# Patient Record
Sex: FEMALE | Race: WHITE | NOT HISPANIC OR LATINO | Employment: UNEMPLOYED | URBAN - METROPOLITAN AREA
[De-identification: names, ages, dates, MRNs, and addresses within clinical notes are randomized per-mention and may not be internally consistent; named-entity substitution may affect disease eponyms.]

---

## 2017-04-03 ENCOUNTER — HOSPITAL ENCOUNTER (OUTPATIENT)
Dept: LAB | Facility: MEDICAL CENTER | Age: 72
End: 2017-04-03
Attending: FAMILY MEDICINE
Payer: MEDICARE

## 2017-04-03 DIAGNOSIS — N39.0 URINARY TRACT INFECTION, SITE UNSPECIFIED: ICD-10-CM

## 2017-04-04 ENCOUNTER — HOSPITAL ENCOUNTER (OUTPATIENT)
Facility: MEDICAL CENTER | Age: 72
End: 2017-04-04
Attending: FAMILY MEDICINE
Payer: MEDICARE

## 2017-04-04 DIAGNOSIS — N39.0 URINARY TRACT INFECTION, SITE UNSPECIFIED: ICD-10-CM

## 2017-04-04 LAB
APPEARANCE UR: CLEAR
BACTERIA #/AREA URNS HPF: ABNORMAL /HPF
BILIRUB UR QL STRIP.AUTO: NEGATIVE
COLOR UR: COLORLESS
CULTURE IF INDICATED INDCX: YES UA CULTURE
EPI CELLS #/AREA URNS HPF: ABNORMAL /HPF
GLUCOSE UR STRIP.AUTO-MCNC: NEGATIVE MG/DL
KETONES UR STRIP.AUTO-MCNC: NEGATIVE MG/DL
LEUKOCYTE ESTERASE UR QL STRIP.AUTO: ABNORMAL
MICRO URNS: ABNORMAL
NITRITE UR QL STRIP.AUTO: NEGATIVE
PH UR STRIP.AUTO: 6.5 [PH]
PROT UR QL STRIP: NEGATIVE MG/DL
RBC # URNS HPF: ABNORMAL /HPF
RBC UR QL AUTO: ABNORMAL
SP GR UR STRIP.AUTO: 1
WBC #/AREA URNS HPF: ABNORMAL /HPF

## 2017-04-04 PROCEDURE — 81001 URINALYSIS AUTO W/SCOPE: CPT

## 2017-04-04 PROCEDURE — 87186 SC STD MICRODIL/AGAR DIL: CPT

## 2017-04-04 PROCEDURE — 87086 URINE CULTURE/COLONY COUNT: CPT

## 2017-04-04 PROCEDURE — 87077 CULTURE AEROBIC IDENTIFY: CPT

## 2017-04-05 DIAGNOSIS — N39.0 URINARY TRACT INFECTION WITHOUT HEMATURIA, SITE UNSPECIFIED: ICD-10-CM

## 2017-04-05 RX ORDER — NITROFURANTOIN 25; 75 MG/1; MG/1
100 CAPSULE ORAL 2 TIMES DAILY
Qty: 14 CAP | Refills: 0 | Status: SHIPPED | OUTPATIENT
Start: 2017-04-05 | End: 2017-11-06

## 2017-04-06 LAB
BACTERIA UR CULT: ABNORMAL
BACTERIA UR CULT: ABNORMAL
SIGNIFICANT IND 70042: ABNORMAL
SOURCE SOURCE: ABNORMAL

## 2017-04-08 ENCOUNTER — HOSPITAL ENCOUNTER (OUTPATIENT)
Dept: LAB | Facility: MEDICAL CENTER | Age: 72
End: 2017-04-08
Attending: FAMILY MEDICINE
Payer: MEDICARE

## 2017-04-08 DIAGNOSIS — R73.01 IMPAIRED FASTING BLOOD SUGAR: ICD-10-CM

## 2017-04-08 DIAGNOSIS — E78.5 DYSLIPIDEMIA: ICD-10-CM

## 2017-04-08 DIAGNOSIS — E03.9 HYPOTHYROIDISM, UNSPECIFIED TYPE: ICD-10-CM

## 2017-04-08 DIAGNOSIS — I10 ESSENTIAL HYPERTENSION: ICD-10-CM

## 2017-04-08 LAB
ALBUMIN SERPL BCP-MCNC: 4 G/DL (ref 3.2–4.9)
ALBUMIN/GLOB SERPL: 1.3 G/DL
ALP SERPL-CCNC: 74 U/L (ref 30–99)
ALT SERPL-CCNC: 12 U/L (ref 2–50)
ANION GAP SERPL CALC-SCNC: 9 MMOL/L (ref 0–11.9)
AST SERPL-CCNC: 17 U/L (ref 12–45)
BILIRUB SERPL-MCNC: 0.5 MG/DL (ref 0.1–1.5)
BUN SERPL-MCNC: 15 MG/DL (ref 8–22)
CALCIUM SERPL-MCNC: 9.5 MG/DL (ref 8.5–10.5)
CHLORIDE SERPL-SCNC: 108 MMOL/L (ref 96–112)
CHOLEST SERPL-MCNC: 173 MG/DL (ref 100–199)
CO2 SERPL-SCNC: 25 MMOL/L (ref 20–33)
CREAT SERPL-MCNC: 0.78 MG/DL (ref 0.5–1.4)
EST. AVERAGE GLUCOSE BLD GHB EST-MCNC: 128 MG/DL
GFR SERPL CREATININE-BSD FRML MDRD: >60 ML/MIN/1.73 M 2
GLOBULIN SER CALC-MCNC: 3.1 G/DL (ref 1.9–3.5)
GLUCOSE SERPL-MCNC: 100 MG/DL (ref 65–99)
HBA1C MFR BLD: 6.1 % (ref 0–5.6)
HDLC SERPL-MCNC: 55 MG/DL
LDLC SERPL CALC-MCNC: 98 MG/DL
POTASSIUM SERPL-SCNC: 4.1 MMOL/L (ref 3.6–5.5)
PROT SERPL-MCNC: 7.1 G/DL (ref 6–8.2)
SODIUM SERPL-SCNC: 142 MMOL/L (ref 135–145)
T4 FREE SERPL-MCNC: 1.13 NG/DL (ref 0.53–1.43)
TRIGL SERPL-MCNC: 101 MG/DL (ref 0–149)
TSH SERPL DL<=0.005 MIU/L-ACNC: 0.11 UIU/ML (ref 0.3–3.7)

## 2017-04-08 PROCEDURE — 36415 COLL VENOUS BLD VENIPUNCTURE: CPT

## 2017-04-08 PROCEDURE — 80053 COMPREHEN METABOLIC PANEL: CPT

## 2017-04-08 PROCEDURE — 80061 LIPID PANEL: CPT

## 2017-04-08 PROCEDURE — 83036 HEMOGLOBIN GLYCOSYLATED A1C: CPT

## 2017-04-08 PROCEDURE — 84439 ASSAY OF FREE THYROXINE: CPT

## 2017-04-08 PROCEDURE — 84443 ASSAY THYROID STIM HORMONE: CPT

## 2017-04-18 ENCOUNTER — OFFICE VISIT (OUTPATIENT)
Dept: MEDICAL GROUP | Facility: PHYSICIAN GROUP | Age: 72
End: 2017-04-18
Payer: MEDICARE

## 2017-04-18 VITALS
WEIGHT: 162 LBS | RESPIRATION RATE: 18 BRPM | OXYGEN SATURATION: 97 % | SYSTOLIC BLOOD PRESSURE: 112 MMHG | TEMPERATURE: 98.6 F | HEART RATE: 66 BPM | BODY MASS INDEX: 26.99 KG/M2 | HEIGHT: 65 IN | DIASTOLIC BLOOD PRESSURE: 62 MMHG

## 2017-04-18 DIAGNOSIS — I10 ESSENTIAL HYPERTENSION: ICD-10-CM

## 2017-04-18 DIAGNOSIS — R73.01 IMPAIRED FASTING BLOOD SUGAR: ICD-10-CM

## 2017-04-18 DIAGNOSIS — E03.9 HYPOTHYROIDISM, UNSPECIFIED TYPE: ICD-10-CM

## 2017-04-18 DIAGNOSIS — E78.5 DYSLIPIDEMIA: ICD-10-CM

## 2017-04-18 DIAGNOSIS — M25.561 CHRONIC PAIN OF RIGHT KNEE: ICD-10-CM

## 2017-04-18 DIAGNOSIS — G89.29 CHRONIC PAIN OF RIGHT KNEE: ICD-10-CM

## 2017-04-18 PROCEDURE — 99214 OFFICE O/P EST MOD 30 MIN: CPT | Performed by: FAMILY MEDICINE

## 2017-04-18 PROCEDURE — 3014F SCREEN MAMMO DOC REV: CPT | Performed by: FAMILY MEDICINE

## 2017-04-18 PROCEDURE — 1101F PT FALLS ASSESS-DOCD LE1/YR: CPT | Performed by: FAMILY MEDICINE

## 2017-04-18 PROCEDURE — G8432 DEP SCR NOT DOC, RNG: HCPCS | Performed by: FAMILY MEDICINE

## 2017-04-18 PROCEDURE — G8419 CALC BMI OUT NRM PARAM NOF/U: HCPCS | Performed by: FAMILY MEDICINE

## 2017-04-18 PROCEDURE — 1036F TOBACCO NON-USER: CPT | Performed by: FAMILY MEDICINE

## 2017-04-18 PROCEDURE — 4040F PNEUMOC VAC/ADMIN/RCVD: CPT | Performed by: FAMILY MEDICINE

## 2017-04-18 RX ORDER — TIMOLOL MALEATE 5 MG/ML
1 SOLUTION OPHTHALMIC DAILY
COMMUNITY
End: 2019-12-03

## 2017-04-18 RX ORDER — VIT C/B6/B5/MAGNESIUM/HERB 173 50-5-6-5MG
CAPSULE ORAL
COMMUNITY
End: 2018-01-09

## 2017-04-18 RX ORDER — LATANOPROST 50 UG/ML
1 SOLUTION/ DROPS OPHTHALMIC NIGHTLY
COMMUNITY
End: 2022-04-01

## 2017-04-18 ASSESSMENT — PATIENT HEALTH QUESTIONNAIRE - PHQ9: CLINICAL INTERPRETATION OF PHQ2 SCORE: 0

## 2017-04-18 NOTE — MR AVS SNAPSHOT
"        Kaylee Nazario   2017 8:20 AM   Office Visit   MRN: [id number]    Department:  [facility]   Dept Phone:  [phone]    Description:  Female : 1945   Provider:  Vera Fernandez M.D.           Reason for Visit     Follow-Up 6 MONTH FOLLOW UP     Results LABS       Allergies as of 2017     Allergen Noted Reactions    Sulfa Drugs 2009   Anaphylaxis      You were diagnosed with     Hypothyroidism, unspecified type   [5440060]       Essential hypertension   [4550528]       Dyslipidemia   [180972]       Impaired fasting blood sugar   [794167]       Chronic pain of right knee   [6664877]         Vital Signs     Blood Pressure Pulse Temperature Respirations Height Weight    112/62 mmHg 66 37 °C (98.6 °F) 18 1.651 m (5' 5\") 73.483 kg (162 lb)    Body Mass Index Oxygen Saturation Smoking Status             26.96 kg/m2 97% Never Smoker          Basic Information     Date Of Birth Sex Race Ethnicity Preferred Language    1945 Female White Non- English      Your appointments     Oct 24, 2017  8:20 AM   Established Patient with Vera Fernandez M.D.   [facility] (--)    [address]  [address]  [facility] [address] [phone]           You will be receiving a confirmation call a few days before your appointment from our automated call confirmation system.              Problem List              ICD-10-CM Priority Class Noted - Resolved    Trigger finger, acquired M65.30   2012 - Present    Impaired fasting blood sugar R73.01   Unknown - Present    Hypothyroidism E03.9   Unknown - Present    Essential hypertension I10   2016 - Present    Dyslipidemia E78.5   2016 - Present    Obmwijiwk-Tpuzzrh-Mvtwzw disease M22.40   2016 - Present    Hypothyroidism E03.9   10/4/2016 - Present      Health Maintenance        Date Due Completion Dates    IMM DTaP/Tdap/Td Vaccine (1 - Tdap) 1963 ---    PAP SMEAR 1965 ---    COLONOSCOPY 1994 ---   "    IMM ZOSTER VACCINE 12/5/2004 ---    MAMMOGRAM 11/7/2017 11/7/2016, 8/29/2014, 2/27/2014, 2/21/2014, 3/12/2013, 1/7/2013, 12/31/2012, 12/28/2011, 12/22/2010, 12/21/2009, 12/21/2009, 12/15/2008, 12/15/2008, 12/5/2007, 12/5/2007, 12/1/2006, 11/28/2005, 11/20/2004    BONE DENSITY 11/13/2020 11/13/2015, 6/24/2009            Current Immunizations     13-VALENT PCV PREVNAR 11/13/2015    Influenza Vaccine Adult HD 9/10/2016, 10/5/2015    Pneumococcal polysaccharide vaccine (PPSV-23) 9/12/2014    Tetanus Vaccine 7/27/2009      Below and/or attached are the medications your provider expects you to take. Review all of your home medications and newly ordered medications with your provider and/or pharmacist. Follow medication instructions as directed by your provider and/or pharmacist. Please keep your medication list with you and share with your provider. Update the information when medications are discontinued, doses are changed, or new medications (including over-the-counter products) are added; and carry medication information at all times in the event of emergency situations     Allergies:  SULFA DRUGS - Anaphylaxis               Medications  Valid as of: April 18, 2017 -  9:16 AM    Generic Name Brand Name Tablet Size Instructions for use    AmLODIPine Besylate (Tab) NORVASC 5 MG Take 5 mg by mouth every day.        AmLODIPine Besylate (Tab) NORVASC 5 MG Take 1 Tab by mouth every day.        Aspirin (Tab) aspirin 81 MG Take 81 mg by mouth every day.        Atorvastatin Calcium (Tab) LIPITOR 20 MG Take 1 Tab by mouth every day.        Calcium-Magnesium-Vitamin D   Take  by mouth every day.        Cholecalciferol   Take 5,000 Units by mouth every day.          Diclofenac Sodium (Gel) Diclofenac Sodium 1 % Apply 4 g to the right knee 4 times a day        Docusate Sodium (Cap) COLACE 100 MG Take 100 mg by mouth every evening.        Fluocinonide (Ointment) LIDEX 0.05 % Apply thinly to oral lesions on the tongue and mouth  4-6 times per day.        Ibuprofen-Diphenhydramine Cit   Take  by mouth as needed.        Latanoprost (Solution) XALATAN 0.005 % Place 1 Drop in both eyes every evening.        Levothyroxine Sodium (Tab) SYNTHROID 75 MCG Take 1 Tab by mouth Every morning on an empty stomach.        Levothyroxine Sodium (Tab) SYNTHROID 100 MCG TAKE ONE TABLET BY MOUTH IN THE MORNING ON  AN  EMPTY  STOMACH        Lidocaine HCl (Solution) XYLOCAINE 2 % 10 mL swish and spit every 6 hours as needed for mouth pain        Meloxicam (Tab) MOBIC 15 MG Take 1 Tab by mouth every day.        Nitrofurantoin Monohyd Macro (Cap) MACROBID 100 MG Take 1 Cap by mouth 2 times a day.        Omega-3 Fatty Acids (CAPSULE DELAYED RELEASE) fish oil 1000 MG Take 1,200 mg by mouth every day.        Oxycodone-Acetaminophen (Tab) PERCOCET 5-325 MG Take 1-2 Tabs by mouth every four hours as needed.        Polyethylene Glycol 3350 (Pack) MIRALAX  Take 17 g by mouth 2 Times a Day. 1/2 capful bid        Timolol Maleate (GEL FORMING SOLUTION) TIMOPTIC-XR 0.5 % Place 1 Drop in both eyes every day.        Turmeric (Cap) Turmeric 500 MG Take  by mouth.        Vitamin E (Cap) VITAMIN E 400 UNIT Take 400 Units by mouth every day.          .                 Medicines prescribed today were sent to:     [facility] [address] [place] [id number]    Phone: [phone] Fax: [phone]    Open 24 Hours?: No      Medication refill instructions:       If your prescription bottle indicates you have medication refills left, it is not necessary to call your provider’s office. Please contact your pharmacy and they will refill your medication.    If your prescription bottle indicates you do not have any refills left, you may request refills at any time through one of the following ways: The online [employer] system (except Urgent Care), by calling your provider’s office, or by asking your pharmacy to contact your  provider’s office with a refill request. Medication refills are processed only during regular business hours and may not be available until the next business day. Your provider may request additional information or to have a follow-up visit with you prior to refilling your medication.   *Please Note: Medication refills are assigned a new Rx number when refilled electronically. Your pharmacy may indicate that no refills were authorized even though a new prescription for the same medication is available at the pharmacy. Please request the medicine by name with the pharmacy before contacting your provider for a refill.        Your To Do List     Future Labs/Procedures Complete By Expires    CBC WITH DIFFERENTIAL  As directed 4/19/2018    COMP METABOLIC PANEL  As directed 4/19/2018    FREE THYROXINE  As directed 4/19/2018    LIPID PROFILE  As directed 4/19/2018    TSH  As directed 4/19/2018         MyChart Access Code: Activation code not generated  Current Wedge Buster Status: Active

## 2017-04-19 NOTE — PROGRESS NOTES
Subjective:      Kaylee Nazario is a 71 y.o. female who presents with Follow-Up and Results            HPI     Patient returns for follow-up of her medical problems.    She continues to have problems with constipation, feeling cold all the time. She has tried MiraLAX which has not helped. She has been taking the ducosate sodium over-the-counter to be able to have bowel movements on a daily basis. She said she takes linzess every 3 days only because she is worried she may get addicted to it. The linzess makes her go to the bathroom right away to 3 times after she takes the medication with soft to loose stools. She still thinks that her thyroid medication is not enough dose to manage her hypothyroidism causing her to have all the above symptoms. Patient has been running already on the hyper active side on levothyroxine 100 µg daily. She did not want to decrease the dose any further.    In terms of her hypertension, she continues to take amlodipine. She said she has been on this for several years. I have made her aware that this has potential for constipation as a side effect.    In terms of her dyslipidemia, she continued to take atorvastatin without any myalgias.    We follow her for impaired fasting blood sugar and she manages this by watching her diet.    She continues to have right knee pain with swelling post arthroscopic surgery for torn meniscus in June 2016 done by Dr. Carias. She went to see Dr. Flores for second opinion who told her to wait for a total of 6 months to see if this would get better. She was told to return if there is no improvement at that time. It has been over 6 months since his surgery. She has tried topical Voltaren gel without help. She has tried meloxicam which also did not help with the swelling and the pain..    Past medical history, past surgical history, family history reviewed-no changes    Social history reviewed-no changes    Allergies reviewed-no changes    Medications  "reviewed-no changes         ROS     Review of systems as per history of present illness, the rest are negative.       Objective:     /62 mmHg  Pulse 66  Temp(Src) 37 °C (98.6 °F)  Resp 18  Ht 1.651 m (5' 5\")  Wt 73.483 kg (162 lb)  BMI 26.96 kg/m2  SpO2 97%     Physical Exam     Examined alert, awake, oriented, not in distress    Neck-supple, no lymphadenopathy, no thyromegaly  Lungs-clear to auscultation, no rales, no wheezes  Heart-regular rate and rhythm, no murmur  Extremities-no edema, clubbing, cyanosis, right knee is bigger than the left due to swelling, no pinpoint tenderness, there is full range of movement on passive flexion and extension without laxity or instability    Hospital Outpatient Visit on 04/08/2017   Component Date Value   • Sodium 04/08/2017 142    • Potassium 04/08/2017 4.1    • Chloride 04/08/2017 108    • Co2 04/08/2017 25    • Anion Gap 04/08/2017 9.0    • Glucose 04/08/2017 100*   • Bun 04/08/2017 15    • Creatinine 04/08/2017 0.78    • Calcium 04/08/2017 9.5    • AST(SGOT) 04/08/2017 17    • ALT(SGPT) 04/08/2017 12    • Alkaline Phosphatase 04/08/2017 74    • Total Bilirubin 04/08/2017 0.5    • Albumin 04/08/2017 4.0    • Total Protein 04/08/2017 7.1    • Globulin 04/08/2017 3.1    • A-G Ratio 04/08/2017 1.3    • Cholesterol,Tot 04/08/2017 173    • Triglycerides 04/08/2017 101 previously 157    • HDL 04/08/2017 55 previously 55    • LDL 04/08/2017 98 previously 109    • Glycohemoglobin 04/08/2017 6.1*   • Est Avg Glucose 04/08/2017 128    • TSH 04/08/2017 0.110*   • Free T-4 04/08/2017 1.13    • GFR If  04/08/2017 >60    • GFR If Non  Ameri* 04/08/2017 >60    Hospital Outpatient Visit on 04/04/2017   Component Date Value   • Micro Urine Req 04/04/2017 Microscopic    • Color 04/04/2017 Colorless    • Character 04/04/2017 Clear    • Specific Gravity 04/04/2017 1.005    • Ph 04/04/2017 6.5    • Glucose 04/04/2017 Negative    • Ketones 04/04/2017 Negative  "   • Protein 04/04/2017 Negative    • Bilirubin 04/04/2017 Negative    • Nitrite 04/04/2017 Negative    • Leukocyte Esterase 04/04/2017 Large*   • Occult Blood 04/04/2017 Moderate*   • Culture Indicated 04/04/2017 Yes    • WBC 04/04/2017 5-10*   • RBC 04/04/2017 5-10*   • Bacteria 04/04/2017 Few*   • Epithelial Cells 04/04/2017 Few    • Significant Indicator 04/04/2017 POS    • Source 04/04/2017 UR    • Urine Culture 04/04/2017 Mixed skin hernán <10,000 cfu/mL*   • Urine Culture 04/04/2017 *                    Value:Escherichia coli  >100,000 cfu/mL               Assessment/Plan:     1. Hypothyroidism, unspecified type  She has ongoing symptoms of hypothyroidism with cold intolerance and constipation but her TSH is slightly suppressed. She does not want me to decrease the dose of the levothyroxine because she said she will feel worse with a lower dose. Her free T4 is normal but the upper limit of normal. She was made aware of potential problems and harm with overactive/over replaced thyroid. She wants to continue the same dose. She will continue taking linzess I told her she can take it on a daily basis. If she is taking this on a daily basis she should stop taking the stool softener to avoid diarrhea. I also discussed with her potential side effect of amlodipine which is constipation. We can change her to different pressure medication but she declined. She said she has been on this medication for years with good control of her blood pressure.  - TSH; Future  - FREE THYROXINE; Future  - LIPID PROFILE; Future  - COMP METABOLIC PANEL; Future  - CBC WITH DIFFERENTIAL; Future    2. Essential hypertension  Good control with amlodipine. She does not want me to change to a different medication because of potential side effects which is constipation.  - TSH; Future  - FREE THYROXINE; Future  - LIPID PROFILE; Future  - COMP METABOLIC PANEL; Future  - CBC WITH DIFFERENTIAL; Future    3. Dyslipidemia  All at target on  atorvastatin.  - TSH; Future  - FREE THYROXINE; Future  - LIPID PROFILE; Future  - COMP METABOLIC PANEL; Future  - CBC WITH DIFFERENTIAL; Future    4. Impaired fasting blood sugar  Fasting blood sugar is still borderline elevated but nondiabetic level. She has increased risk for diabetes so she will continue to avoid sweets and decrease the carbohydrates.  - TSH; Future  - FREE THYROXINE; Future  - LIPID PROFILE; Future  - COMP METABOLIC PANEL; Future  - CBC WITH DIFFERENTIAL; Future    5. Chronic pain of right knee  Advised to schedule follow-up appointment with Dr. Flores because the right knee has not improved.  - TSH; Future  - FREE THYROXINE; Future  - LIPID PROFILE; Future  - COMP METABOLIC PANEL; Future  - CBC WITH DIFFERENTIAL; Future      Please note that this dictation was created using voice recognition software. I have worked with consultants from the vendor as well as technical experts from [employer] to optimize the interface. I have made every reasonable attempt to correct obvious errors, but I expect that there are errors of grammar and possibly content I did not discover before finalizing the note.

## 2017-05-01 RX ORDER — ATORVASTATIN CALCIUM 20 MG/1
TABLET, FILM COATED ORAL
Qty: 90 TAB | Refills: 3 | Status: SHIPPED | OUTPATIENT
Start: 2017-05-01 | End: 2018-01-09

## 2017-05-01 RX ORDER — LEVOTHYROXINE SODIUM 0.1 MG/1
TABLET ORAL
Qty: 90 TAB | Refills: 3 | Status: SHIPPED | OUTPATIENT
Start: 2017-05-01 | End: 2018-03-13 | Stop reason: SDUPTHER

## 2017-05-01 RX ORDER — AMLODIPINE BESYLATE 5 MG/1
TABLET ORAL
Qty: 90 TAB | Refills: 3 | Status: SHIPPED | OUTPATIENT
Start: 2017-05-01 | End: 2017-11-06

## 2017-06-15 RX ORDER — LINACLOTIDE 145 UG/1
CAPSULE, GELATIN COATED ORAL
Qty: 30 CAP | Refills: 0 | Status: SHIPPED | OUTPATIENT
Start: 2017-06-15 | End: 2018-01-01 | Stop reason: SDUPTHER

## 2017-09-15 ENCOUNTER — HOSPITAL ENCOUNTER (OUTPATIENT)
Dept: LAB | Facility: MEDICAL CENTER | Age: 72
End: 2017-09-15
Attending: FAMILY MEDICINE
Payer: MEDICARE

## 2017-09-15 DIAGNOSIS — E03.9 HYPOTHYROIDISM, UNSPECIFIED TYPE: ICD-10-CM

## 2017-09-15 DIAGNOSIS — R73.01 IMPAIRED FASTING BLOOD SUGAR: ICD-10-CM

## 2017-09-15 DIAGNOSIS — E78.5 DYSLIPIDEMIA: ICD-10-CM

## 2017-09-15 DIAGNOSIS — M25.561 CHRONIC PAIN OF RIGHT KNEE: ICD-10-CM

## 2017-09-15 DIAGNOSIS — G89.29 CHRONIC PAIN OF RIGHT KNEE: ICD-10-CM

## 2017-09-15 DIAGNOSIS — I10 ESSENTIAL HYPERTENSION: ICD-10-CM

## 2017-09-15 LAB
ALBUMIN SERPL BCP-MCNC: 4 G/DL (ref 3.2–4.9)
ALBUMIN/GLOB SERPL: 1.4 G/DL
ALP SERPL-CCNC: 75 U/L (ref 30–99)
ALT SERPL-CCNC: 13 U/L (ref 2–50)
ANION GAP SERPL CALC-SCNC: 6 MMOL/L (ref 0–11.9)
AST SERPL-CCNC: 17 U/L (ref 12–45)
BASOPHILS # BLD AUTO: 1.2 % (ref 0–1.8)
BASOPHILS # BLD: 0.07 K/UL (ref 0–0.12)
BILIRUB SERPL-MCNC: 0.5 MG/DL (ref 0.1–1.5)
BUN SERPL-MCNC: 21 MG/DL (ref 8–22)
CALCIUM SERPL-MCNC: 9.2 MG/DL (ref 8.5–10.5)
CHLORIDE SERPL-SCNC: 107 MMOL/L (ref 96–112)
CHOLEST SERPL-MCNC: 157 MG/DL (ref 100–199)
CO2 SERPL-SCNC: 29 MMOL/L (ref 20–33)
CREAT SERPL-MCNC: 0.73 MG/DL (ref 0.5–1.4)
EOSINOPHIL # BLD AUTO: 0.29 K/UL (ref 0–0.51)
EOSINOPHIL NFR BLD: 5.1 % (ref 0–6.9)
ERYTHROCYTE [DISTWIDTH] IN BLOOD BY AUTOMATED COUNT: 43.7 FL (ref 35.9–50)
GFR SERPL CREATININE-BSD FRML MDRD: >60 ML/MIN/1.73 M 2
GLOBULIN SER CALC-MCNC: 2.9 G/DL (ref 1.9–3.5)
GLUCOSE SERPL-MCNC: 100 MG/DL (ref 65–99)
HCT VFR BLD AUTO: 39.9 % (ref 37–47)
HDLC SERPL-MCNC: 52 MG/DL
HGB BLD-MCNC: 13 G/DL (ref 12–16)
IMM GRANULOCYTES # BLD AUTO: 0.01 K/UL (ref 0–0.11)
IMM GRANULOCYTES NFR BLD AUTO: 0.2 % (ref 0–0.9)
LDLC SERPL CALC-MCNC: 80 MG/DL
LYMPHOCYTES # BLD AUTO: 1.72 K/UL (ref 1–4.8)
LYMPHOCYTES NFR BLD: 30.5 % (ref 22–41)
MCH RBC QN AUTO: 30.2 PG (ref 27–33)
MCHC RBC AUTO-ENTMCNC: 32.6 G/DL (ref 33.6–35)
MCV RBC AUTO: 92.8 FL (ref 81.4–97.8)
MONOCYTES # BLD AUTO: 0.35 K/UL (ref 0–0.85)
MONOCYTES NFR BLD AUTO: 6.2 % (ref 0–13.4)
NEUTROPHILS # BLD AUTO: 3.2 K/UL (ref 2–7.15)
NEUTROPHILS NFR BLD: 56.8 % (ref 44–72)
NRBC # BLD AUTO: 0 K/UL
NRBC BLD AUTO-RTO: 0 /100 WBC
PLATELET # BLD AUTO: 240 K/UL (ref 164–446)
PMV BLD AUTO: 11.4 FL (ref 9–12.9)
POTASSIUM SERPL-SCNC: 4.2 MMOL/L (ref 3.6–5.5)
PROT SERPL-MCNC: 6.9 G/DL (ref 6–8.2)
RBC # BLD AUTO: 4.3 M/UL (ref 4.2–5.4)
SODIUM SERPL-SCNC: 142 MMOL/L (ref 135–145)
T4 FREE SERPL-MCNC: 1.27 NG/DL (ref 0.53–1.43)
TRIGL SERPL-MCNC: 123 MG/DL (ref 0–149)
TSH SERPL DL<=0.005 MIU/L-ACNC: 0.08 UIU/ML (ref 0.3–3.7)
WBC # BLD AUTO: 5.6 K/UL (ref 4.8–10.8)

## 2017-09-15 PROCEDURE — 84439 ASSAY OF FREE THYROXINE: CPT

## 2017-09-15 PROCEDURE — 84443 ASSAY THYROID STIM HORMONE: CPT

## 2017-09-15 PROCEDURE — 80061 LIPID PANEL: CPT

## 2017-09-15 PROCEDURE — 80053 COMPREHEN METABOLIC PANEL: CPT

## 2017-09-15 PROCEDURE — 85025 COMPLETE CBC W/AUTO DIFF WBC: CPT

## 2017-09-15 PROCEDURE — 36415 COLL VENOUS BLD VENIPUNCTURE: CPT

## 2017-09-19 ENCOUNTER — TELEPHONE (OUTPATIENT)
Dept: MEDICAL GROUP | Facility: PHYSICIAN GROUP | Age: 72
End: 2017-09-19

## 2017-09-19 NOTE — TELEPHONE ENCOUNTER
ESTABLISHED PATIENT PRE-VISIT PLANNING     Note: Patient will not be contacted if there is no indication to call.     1.  Reviewed notes from the last few office visits within the medical group: Yes    2.  If any orders were placed at last visit or intended to be done for this visit (i.e. 6 mos follow-up), do we have Results/Consult Notes?        •  Labs - Labs ordered, completed and results are in chart.       •  Imaging - Imaging ordered, completed and results are in chart.       •  Referrals - No referrals were ordered at last office visit.    3. Is this appointment scheduled as a Hospital Follow-Up? No    4.  Immunizations were updated in [employer] using WebIZ?: Yes       •  Web [employer] Recommendations: FLU, TDAP and ZOSTAVAX (Shingles)    5.  Patient is due for the following Health Maintenance Topics:   Health Maintenance Due   Topic Date Due   • Annual Wellness Visit  1945   • IMM DTaP/Tdap/Td Vaccine (1 - Tdap) 12/05/1963   • PAP SMEAR  12/05/1965   • COLONOSCOPY  12/05/1994   • IMM ZOSTER VACCINE  12/05/2004   • IMM INFLUENZA (1) 09/01/2017       - Patient has completed FLU, PNEUMOVAX (PPSV23) and PREVNAR (PCV13)  Immunization(s) per WebIZ. Chart has been updated.      6.  Patient was NOT informed to arrive 15 min prior to their scheduled appointment and bring in their medication bottles.

## 2017-09-20 ENCOUNTER — OFFICE VISIT (OUTPATIENT)
Dept: MEDICAL GROUP | Facility: PHYSICIAN GROUP | Age: 72
End: 2017-09-20
Payer: MEDICARE

## 2017-09-20 VITALS
DIASTOLIC BLOOD PRESSURE: 60 MMHG | BODY MASS INDEX: 27 KG/M2 | RESPIRATION RATE: 16 BRPM | TEMPERATURE: 97 F | HEART RATE: 68 BPM | OXYGEN SATURATION: 97 % | SYSTOLIC BLOOD PRESSURE: 130 MMHG | WEIGHT: 162.04 LBS | HEIGHT: 65 IN

## 2017-09-20 DIAGNOSIS — Z23 NEED FOR IMMUNIZATION AGAINST INFLUENZA: ICD-10-CM

## 2017-09-20 DIAGNOSIS — E78.5 DYSLIPIDEMIA: ICD-10-CM

## 2017-09-20 DIAGNOSIS — E03.9 HYPOTHYROIDISM, UNSPECIFIED TYPE: ICD-10-CM

## 2017-09-20 DIAGNOSIS — R73.01 IMPAIRED FASTING BLOOD SUGAR: ICD-10-CM

## 2017-09-20 DIAGNOSIS — I10 ESSENTIAL HYPERTENSION: ICD-10-CM

## 2017-09-20 PROCEDURE — 90662 IIV NO PRSV INCREASED AG IM: CPT | Performed by: FAMILY MEDICINE

## 2017-09-20 PROCEDURE — G0008 ADMIN INFLUENZA VIRUS VAC: HCPCS | Performed by: FAMILY MEDICINE

## 2017-09-20 PROCEDURE — 99214 OFFICE O/P EST MOD 30 MIN: CPT | Mod: 25 | Performed by: FAMILY MEDICINE

## 2017-09-20 RX ORDER — LEVOTHYROXINE SODIUM 88 UG/1
88 TABLET ORAL
Qty: 90 TAB | Refills: 0 | Status: SHIPPED | OUTPATIENT
Start: 2017-09-20 | End: 2017-11-06

## 2017-09-20 NOTE — PROGRESS NOTES
"Subjective:      Kaylee Nazario is a 71 y.o. female who presents with Follow-Up and Immunizations (flu hd)            HPI     Patient returns for follow-up of her medical problems.    She continues to take amlodipine for hypertension with good control of her blood pressure without side effects.    In terms of her dyslipidemia, she continues to take atorvastatin without myalgias.    We follow her for impaired fasting blood sugar. She manages this by watching her diet.    She continues to take thyroid replacement for hypothyroidism.    She was having ongoing right knee pain with swelling post arthroscopic surgery for torn meniscus in June 2016 done by Dr. Carias. She went to see Dr. Flores for second opinion who told her to wait for a total of 6 months to see if this would get better. She went back because of ongoing pain and she was recommended surgery. The pain however ultimately resolved on its own and so she did not need to do the surgery anymore.    She needs a flu shot today.    Past medical history, past surgical history, family history reviewed-no changes    Social history reviewed-no changes    Allergies reviewed-no changes    Medications reviewed-no changes    ROS     Review of systems as per history of present illness, the rest are negative.       Objective:     /60   Pulse 68   Temp 36.1 °C (97 °F)   Resp 16   Ht 1.651 m (5' 5\")   Wt 73.5 kg (162 lb 0.6 oz)   SpO2 97%   BMI 26.96 kg/m²      Physical Exam     Examined alert, awake, oriented, not in distress    Neck-supple, no lymphadenopathy, no thyromegaly  Lungs-clear to auscultation, no rales, no wheezes  Heart-regular rate and rhythm, no murmur  Extremities-no edema, clubbing, cyanosis     Hospital Outpatient Visit on 09/15/2017   Component Date Value   • TSH 09/15/2017 0.080*   • Free T-4 09/15/2017 1.27    • Cholesterol,Tot 09/15/2017 157    • Triglycerides 09/15/2017 123    • HDL 09/15/2017 52    • LDL 09/15/2017 80    • Sodium " 09/15/2017 142    • Potassium 09/15/2017 4.2    • Chloride 09/15/2017 107    • Co2 09/15/2017 29    • Anion Gap 09/15/2017 6.0    • Glucose 09/15/2017 100*   • Bun 09/15/2017 21    • Creatinine 09/15/2017 0.73    • Calcium 09/15/2017 9.2    • AST(SGOT) 09/15/2017 17    • ALT(SGPT) 09/15/2017 13    • Alkaline Phosphatase 09/15/2017 75    • Total Bilirubin 09/15/2017 0.5    • Albumin 09/15/2017 4.0    • Total Protein 09/15/2017 6.9    • Globulin 09/15/2017 2.9    • A-G Ratio 09/15/2017 1.4    • WBC 09/15/2017 5.6    • RBC 09/15/2017 4.30    • Hemoglobin 09/15/2017 13.0    • Hematocrit 09/15/2017 39.9    • MCV 09/15/2017 92.8    • MCH 09/15/2017 30.2    • MCHC 09/15/2017 32.6*   • RDW 09/15/2017 43.7    • Platelet Count 09/15/2017 240    • MPV 09/15/2017 11.4    • Neutrophils-Polys 09/15/2017 56.80    • Lymphocytes 09/15/2017 30.50    • Monocytes 09/15/2017 6.20    • Eosinophils 09/15/2017 5.10    • Basophils 09/15/2017 1.20    • Immature Granulocytes 09/15/2017 0.20    • Nucleated RBC 09/15/2017 0.00    • Neutrophils (Absolute) 09/15/2017 3.20    • Lymphs (Absolute) 09/15/2017 1.72    • Monos (Absolute) 09/15/2017 0.35    • Eos (Absolute) 09/15/2017 0.29    • Baso (Absolute) 09/15/2017 0.07    • Immature Granulocytes (a* 09/15/2017 0.01    • NRBC (Absolute) 09/15/2017 0.00    • GFR If  09/15/2017 >60    • GFR If Non  Ameri* 09/15/2017 >60         Assessment/Plan:     1. Essential hypertension  Controlled on her medication.    2. Dyslipidemia  All lap target on atorvastatin.    3. Impaired fasting blood sugar  Very borderline elevated blood sugar but nondiabetic level. Continue watching the carbohydrates and sweets.    4. Hypothyroidism, unspecified type  This is over replaced. This is even more over replaced on before. She did not want me to adjust the dose of the medication last time but this time I told her since this is getting worse we will need to decrease the dose from 100 µg to 88 µg  daily. We will do follow-up TSH in 8 weeks.  - TSH; Future  - levothyroxine (SYNTHROID) 88 MCG Tab; Take 1 Tab by mouth Every morning on an empty stomach.  Dispense: 90 Tab; Refill: 0    5. Need for immunization against influenza  Flu shot was given today.  - INFLUENZA VACCINE, HIGH DOSE (65+ ONLY)     follow-up in 6 months or sooner if needed.      Please note that this dictation was created using voice recognition software. I have worked with consultants from the vendor as well as technical experts from [facility] to optimize the interface. I have made every reasonable attempt to correct obvious errors, but I expect that there are errors of grammar and possibly content I did not discover before finalizing the note.

## 2017-11-06 ENCOUNTER — OFFICE VISIT (OUTPATIENT)
Dept: MEDICAL GROUP | Facility: PHYSICIAN GROUP | Age: 72
End: 2017-11-06
Payer: MEDICARE

## 2017-11-06 VITALS
OXYGEN SATURATION: 98 % | TEMPERATURE: 97.6 F | DIASTOLIC BLOOD PRESSURE: 84 MMHG | SYSTOLIC BLOOD PRESSURE: 118 MMHG | BODY MASS INDEX: 27.16 KG/M2 | HEART RATE: 76 BPM | WEIGHT: 163 LBS | HEIGHT: 65 IN | RESPIRATION RATE: 12 BRPM

## 2017-11-06 DIAGNOSIS — K59.00 CONSTIPATION, UNSPECIFIED CONSTIPATION TYPE: ICD-10-CM

## 2017-11-06 DIAGNOSIS — M22.41 CHONDROMALACIA OF RIGHT PATELLA: ICD-10-CM

## 2017-11-06 DIAGNOSIS — H26.9 CATARACT OF RIGHT EYE, UNSPECIFIED CATARACT TYPE: ICD-10-CM

## 2017-11-06 DIAGNOSIS — K13.79 MOUTH SORES: ICD-10-CM

## 2017-11-06 DIAGNOSIS — I10 ESSENTIAL HYPERTENSION: ICD-10-CM

## 2017-11-06 DIAGNOSIS — E03.9 HYPOTHYROIDISM, UNSPECIFIED TYPE: ICD-10-CM

## 2017-11-06 PROCEDURE — 99204 OFFICE O/P NEW MOD 45 MIN: CPT | Performed by: INTERNAL MEDICINE

## 2017-11-06 NOTE — ASSESSMENT & PLAN NOTE
Has had long standing issues with trying to control her hypothyroidism well. Used to see an endocrinologist at [facility] (debbie Brunner) who had told her that that's the dose (100 mcg) she should remain on for lifetime. However her subsequent PCP then started to re-titrate the medication from 100 mcg to 88 mcg given that her TSH was low. She had told her PCP that she was lethargic, had mind wandering, severe constipation but her PCP wouldn't prescribe her the 100 mcg again. Has pills for 100 mcg dose at home but didn't take them because she wanted to check with a doctor first. Was on a 100 mcg dose since seeing the [facility] endocrinologist 3 years ago. With every TSH check she's had someone always changes her dose.

## 2017-11-06 NOTE — PROGRESS NOTES
PRIMARY CARE CLINIC NEW PATIENT H&P  Chief Complaint   Patient presents with   • Thyroid Problem       History of Present Illness     Kaylee is a pleasant 72 yo female here to establish care.     Hypothyroidism  Has had long standing issues with trying to control her hypothyroidism well. Used to see an endocrinologist at [facility] (debbie Brunner) who had told her that that's the dose (100 mcg) she should remain on for lifetime. However her subsequent PCP then started to re-titrate the medication from 100 mcg to 88 mcg given that her TSH was low. She had told her PCP that she was lethargic, had mind wandering, severe constipation but her PCP wouldn't prescribe her the 100 mcg again. Has pills for 100 mcg dose at home but didn't take them because she wanted to check with a doctor first. Was on a 100 mcg dose since seeing the [facility] endocrinologist 3 years ago. With every TSH check she's had someone always changes her dose.     Cataract  Due for right sided cataract surgery next Monday. Is on eye drops.     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 She takes over the counter women's laxative, docusate, and prn Linzess (prescribed from her prior PCP). Her constipation is better controlled when she's on a dose of synthroid 100 mcg. Doesn't take any fiber supplements. Eats a lot of apples. Has lettuce with 2 meals a day. Since her  has been upgraded from a thickened liquids diet since his hospitalization he's been binging a lot and they have been eating out every night for dinner.     Mouth sores  Uses viscous lidocaine for this. Has had multiple inconclusive biopsies. Now has several blisters on the roof of her mouth. She's been having issues with this since she moved to [place] from [facility]. Doesn't have any symptoms in [place]. This has been an issue since she moved to [place] for 14 years.     Essential  hypertension  Has been on amlodipine when she was in her 30s. She isn't sure if she needs it. Had a hypertensive crisis in 2002 when she lived in [place] and had a dose increase in her amlodipine. Hasn't had an episode since. Denies chest pain, shortness of breath, palpitations.     Kmljvzdza-Dzdhhby-Dtghsg disease  Had R knee pain s/p a meniscus repair that had initially worsened her symptoms. However, the entire problem with her right knee entirely self resolved and she has no issues.       Current Outpatient Prescriptions   Medication Sig Dispense Refill   • LINZESS 145 MCG Cap TAKE ONE CAPSULE BY MOUTH ONCE DAILY 30 Cap 0   • atorvastatin (LIPITOR) 20 MG Tab TAKE ONE TABLET BY MOUTH ONCE DAILY 90 Tab 3   • levothyroxine (SYNTHROID) 100 MCG Tab TAKE ONE TABLET BY MOUTH ONCE DAILY IN THE MORNING ON  AN  EMPTY  STOMACH 90 Tab 3   • Turmeric 500 MG Cap Take  by mouth.     • latanoprost (XALATAN) 0.005 % Solution Place 1 Drop in both eyes every evening.     • timolol gel-forming (TIMOPTIC-XR) 0.5 % GEL FORMING SOLUTION Place 1 Drop in both eyes every day.     • lidocaine viscous 2% (XYLOCAINE) 2 % Solution 10 mL swish and spit every 6 hours as needed for mouth pain 100 mL 1   • fluocinonide (LIDEX) 0.05 % Ointment Apply thinly to oral lesions on the tongue and mouth 4-6 times per day. 1 Tube 1   • Calcium-Magnesium-Vitamin D (CALCIUM 500 PO) Take  by mouth every day.     • polyethylene glycol/lytes (MIRALAX) Pack Take 17 g by mouth 2 Times a Day. 1/2 capful bid     • docusate sodium (COLACE) 100 MG Cap Take 100 mg by mouth every evening.     • aspirin 81 MG tablet Take 81 mg by mouth every day.     • vitamin e (VITAMIN E) 400 UNIT CAPS Take 400 Units by mouth every day.       • Omega-3 Fatty Acids (FISH OIL) 1000 MG CPDR Take 1,200 mg by mouth every day.       No current facility-administered medications for this visit.        Past Medical History:   Diagnosis Date   • Cxknglpho-Uvgrqjz-Egelid disease 6/8/2016   •  Cataract 10/2014    Left Eye IOL   • Constipation 2017   • Dyslipidemia 2016   • Essential hypertension 2016   • Hypertension    • Hypothyroidism    • Impaired fasting blood sugar    • Osteopenia    • Trigger finger, acquired 2012     ICD-10 transition     Past Surgical History:   Procedure Laterality Date   • KNEE ARTHROSCOPY Right 2016    Procedure: KNEE ARTHROSCOPY, MEDIAL MENISECTOMY, DEBRIEDMENT OF LATERAL  FEMORAL CONDYLE;  Surgeon: Real Carias M.D.;  Location: SURGERY [facility];  Service:    • LATERAL RELEASE Right 2016    Procedure: LATERAL RELEASE;  Surgeon: Real Carias M.D.;  Location: SURGERY [facility];  Service:    • TRIGGER FINGER RELEASE Bilateral 2016    Procedure: TRIGGER FINGER RELEASE-RING;  Surgeon: Brett Maddox M.D.;  Location: [facility];  Service:    • TRIGGER FINGER RELEASE  2012    Performed by Brett Maddox M.D. at [facility]   • COLONOSCOPY  2011   • CATARACT EXTRACTION WITH IOL Left 3/2009    left   • TUBAL LIGATION     • BREAST BIOPSY      multiple breast biopsies   • TONSILLECTOMY  as child     Social History   Substance Use Topics   • Smoking status: Never Smoker   • Smokeless tobacco: Never Used   • Alcohol use No     Social History     Social History Narrative    Works as a psychiatric nurse      Family History   Problem Relation Age of Onset   • Stroke Father    • Hyperlipidemia Brother    • Other Brother      aortic aneurysm repair   • Cancer Maternal Aunt      Family Status   Relation Status   • Father     stroke   • Mother     multiple organ failure   • Brother Alive   • Maternal Aunt      Allergies: Sulfa drugs    ROS  Constitutional: positive for fatigue  HEENT: Negative for  vision changes, hearing changes    Respiratory: Negative for shortness of breath  Cardiovascular: Negative for chest pain, palpitations  Gastrointestinal: Negative for blood in stool,  "diarrhea. Positive for constipation   Genitourinary: Negative for dysuria, polyuria  Musculoskeletal: Negative for myalgias, back pain, and joint pain.   Skin: Negative for rash  Neurological: Negative for numbness, tingling  Psychiatric/Behavioral: Negative for depression, suicidal/homicidal ideation      Objective   Blood pressure 118/84, pulse 76, temperature 36.4 °C (97.6 °F), resp. rate 12, height 1.651 m (5' 5\"), weight 73.9 kg (163 lb), SpO2 98 %. Body mass index is 27.12 kg/m².    General: Alert, oriented. In no acute distress   HEET: EOMI, PERRL, conjunctiva non-injected, sclera non-icteric.  Nares patent with no significant congestion or drainage.  Chloe pinnae, external auditory canals, TM pearly gray with normal light reflex bilaterally.Oral mucous membranes pink and moist with no lesions.  Neck: supple with no cervical, subclavicular lymphadenopathy, JVD, palpable thyroid nodules   Lungs: clear to auscultation bilaterally with good excursion.  CV: regular rate and rhythm.  Abdomen soft, non-distended, non-tender with normal bowel sounds. No hepatosplenomegaly, no masses palpated  Skin: no lesions. Warm, dry   Psychiatric: appropriate mood and affect     Assessment and Plan   The following treatment plan was discussed     1. Hypothyroidism, unspecified type  Told patient it's OK to resume her prior synthroid dose of 100 mcg as determined by R endocrinology 3 years ago. Her symptoms were well controlled on this dose and will re-assess when she's resumed it in 2 months if she's still tolerating it well.     2. Cataract of right eye, unspecified cataract type  Is due for surgery next Monday. Has already had left cataract surgery.     3. Constipation, unspecified constipation type  Will resume her prior dose of synthroid 100 mcg to see if this helps alleviate her symptoms. I am not sure the appropriateness of using Linzess as prescribed by her prior PCP but the patient uses it infrequently and is mostly " taking over the counter laxatives and stool softeners. Will re-assess at next visit in 2 months.     4. Essential hypertension  Blood pressure is well within goal today at 118/84. Will trial off of amlodipine (only on 5 mg). Asked patient to keep blood pressure log (first thing am blood pressures, discussed proper technique). Asked her to report if she's consistently > 140/90. Will re-assess at next follow up visit in 2 months.     Return in about 2 months (around 1/6/2018).    Health Maintenance    There are no preventive care reminders to display for this patient.    Jose C Caldwell MD  Internal Medicine  [facility]

## 2017-11-06 NOTE — ASSESSMENT & PLAN NOTE
Uses viscous lidocaine for this. Has had multiple inconclusive biopsies. Now has several blisters on the roof of her mouth. She's been having issues with this since she moved to [place] from [facility]. Doesn't have any symptoms in [place]. This has been an issue since she moved to [place] for 14 years.

## 2017-11-06 NOTE — ASSESSMENT & PLAN NOTE
Had R knee pain s/p a meniscus repair that had initially worsened her symptoms. However, the entire problem with her right knee entirely self resolved and she has no issues.

## 2017-11-06 NOTE — PATIENT INSTRUCTIONS
· Your amlodipine has been discontinued. Be sure to take your blood pressures at home to see how they do without this (first thing in the morning). If your blood pressures are > 140/90 then please let us know  · You may take a dose of synthroid 100 mcg if you feel better on that dose

## 2017-11-06 NOTE — ASSESSMENT & PLAN NOTE
Has been on amlodipine when she was in her 30s. She isn't sure if she needs it. Had a hypertensive crisis in 2002 when she lived in [place] and had a dose increase in her amlodipine. Hasn't had an episode since. Denies chest pain, shortness of breath, palpitations.

## 2017-11-07 ENCOUNTER — TELEPHONE (OUTPATIENT)
Dept: MEDICAL GROUP | Facility: PHYSICIAN GROUP | Age: 72
End: 2017-11-07

## 2017-11-07 NOTE — TELEPHONE ENCOUNTER
----- Message from Kaylee Nazario sent at 11/6/2017  7:32 PM PST -----  Regarding: Non-Urgent Medical Question  Contact: [phone]  Dr. Caldwell - thank you for seeing me today and taking me as a new patient.   I had 2 questions:  Why did you discontinue my Vitamin D?   I never drink milk products so thought that would be a good idea to take it.     Also, what is the rationale that you would like me not to take Advil PM.  Thank you.  Rip

## 2017-11-10 ENCOUNTER — APPOINTMENT (OUTPATIENT)
Dept: RADIOLOGY | Facility: MEDICAL CENTER | Age: 72
End: 2017-11-10
Attending: INTERNAL MEDICINE
Payer: MEDICARE

## 2017-11-10 DIAGNOSIS — Z12.31 VISIT FOR SCREENING MAMMOGRAM: ICD-10-CM

## 2017-11-10 PROCEDURE — G0202 SCR MAMMO BI INCL CAD: HCPCS

## 2018-01-02 NOTE — TELEPHONE ENCOUNTER
From: Kaylee Nazario  Sent: 1/1/2018 7:59 AM PST  Subject: Medication Renewal Request    Kaylee Nazario would like a refill of the following medications:     LINZESS 145 MCG Cap [Vera Fernandez M.D.]    Preferred pharmacy: [facility] [address] [place] [address] - [address] [facility]    

## 2018-01-09 ENCOUNTER — OFFICE VISIT (OUTPATIENT)
Dept: MEDICAL GROUP | Facility: PHYSICIAN GROUP | Age: 73
End: 2018-01-09
Payer: MEDICARE

## 2018-01-09 VITALS
OXYGEN SATURATION: 96 % | DIASTOLIC BLOOD PRESSURE: 80 MMHG | RESPIRATION RATE: 12 BRPM | WEIGHT: 164 LBS | SYSTOLIC BLOOD PRESSURE: 118 MMHG | TEMPERATURE: 97.6 F | HEIGHT: 65 IN | BODY MASS INDEX: 27.32 KG/M2 | HEART RATE: 74 BPM

## 2018-01-09 DIAGNOSIS — E03.9 HYPOTHYROIDISM, UNSPECIFIED TYPE: ICD-10-CM

## 2018-01-09 DIAGNOSIS — K59.00 CONSTIPATION, UNSPECIFIED CONSTIPATION TYPE: ICD-10-CM

## 2018-01-09 DIAGNOSIS — E78.5 DYSLIPIDEMIA: ICD-10-CM

## 2018-01-09 DIAGNOSIS — I10 ESSENTIAL HYPERTENSION: ICD-10-CM

## 2018-01-09 DIAGNOSIS — K13.79 MOUTH SORES: ICD-10-CM

## 2018-01-09 PROCEDURE — 99213 OFFICE O/P EST LOW 20 MIN: CPT | Performed by: INTERNAL MEDICINE

## 2018-01-09 RX ORDER — ATORVASTATIN CALCIUM 10 MG/1
10 TABLET, FILM COATED ORAL DAILY
Qty: 90 TAB | Refills: 3
Start: 2018-01-09 | End: 2018-03-13 | Stop reason: SDUPTHER

## 2018-01-09 RX ORDER — AMLODIPINE BESYLATE 5 MG/1
TABLET ORAL
COMMUNITY
Start: 2017-12-12 | End: 2018-03-13 | Stop reason: SDUPTHER

## 2018-01-09 NOTE — ASSESSMENT & PLAN NOTE
Needed to resume amlodipine 5 mg since last visit when we tried to discontinue. Denies lightheadedness, dizziness.

## 2018-01-09 NOTE — ASSESSMENT & PLAN NOTE
She is doing very well on levothyroxine 100 mcg. She doesn't tolerate dose titrations based on labs and does very well on this dose.

## 2018-01-09 NOTE — ASSESSMENT & PLAN NOTE
Uses Linzess periodically, has been on it from prior PCPs. Her constipation has improved since she increased her dose from levothyroxine 88 mcg to 100 mcg. On the Atkins diet which she thinks is contributing to her constipation. Did do a vegetarian diet 3.5 years ago but said her cholesterol samaria on that because she was eating pasta.

## 2018-01-09 NOTE — PROGRESS NOTES
PRIMARY CARE CLINIC FOLLOW UP VISIT  Chief Complaint   Patient presents with   • Hypothyroidism     History of Present Illness     Hypothyroidism  She is doing very well on levothyroxine 100 mcg. She doesn't tolerate dose titrations based on labs and does very well on this dose.     Constipation  Uses Linzess periodically, has been on it from prior PCPs. Her constipation has improved since she increased her dose from levothyroxine 88 mcg to 100 mcg. On the Atkins diet which she thinks is contributing to her constipation. Did do a vegetarian diet 3.5 years ago but said her cholesterol samaria on that because she was eating pasta.     Dyslipidemia  Lipids when last checked 9/2017 were at goal on atorvastatin 20 mg.     Mouth sores  Mouth sores have returned.     Essential hypertension  Needed to resume amlodipine 5 mg since last visit when we tried to discontinue. Denies lightheadedness, dizziness.     Current Outpatient Prescriptions   Medication Sig Dispense Refill   • atorvastatin (LIPITOR) 10 MG Tab Take 1 Tab by mouth every day. 90 Tab 3   • levothyroxine (SYNTHROID) 100 MCG Tab TAKE ONE TABLET BY MOUTH ONCE DAILY IN THE MORNING ON  AN  EMPTY  STOMACH 90 Tab 3   • latanoprost (XALATAN) 0.005 % Solution Place 1 Drop in both eyes every evening.     • timolol gel-forming (TIMOPTIC-XR) 0.5 % GEL FORMING SOLUTION Place 1 Drop in both eyes every day.     • fluocinonide (LIDEX) 0.05 % Ointment Apply thinly to oral lesions on the tongue and mouth 4-6 times per day. 1 Tube 1   • Calcium-Magnesium-Vitamin D (CALCIUM 500 PO) Take  by mouth every day.     • aspirin 81 MG tablet Take 81 mg by mouth every day.     • vitamin e (VITAMIN E) 400 UNIT CAPS Take 400 Units by mouth every day.       • Omega-3 Fatty Acids (FISH OIL) 1000 MG CPDR Take 1,200 mg by mouth every day.     • amlodipine (NORVASC) 5 MG Tab      • Linaclotide (LINZESS) 145 MCG Cap Take 1 Cap by mouth 1 time daily as needed. 30 Cap 0   • lidocaine viscous 2%  (XYLOCAINE) 2 % Solution 10 mL swish and spit every 6 hours as needed for mouth pain 100 mL 1     No current facility-administered medications for this visit.      Past Medical History:   Diagnosis Date   • Gsvkbkrfi-Hoftqnd-Kozsln disease 2016   • Cataract 10/2014    Left Eye IOL   • Constipation 2017   • Dyslipidemia 2016   • Essential hypertension 2016   • Hypertension    • Hypothyroidism    • Impaired fasting blood sugar    • Osteopenia    • Trigger finger, acquired 2012     ICD-10 transition     Past Surgical History:   Procedure Laterality Date   • KNEE ARTHROSCOPY Right 2016    Procedure: KNEE ARTHROSCOPY, MEDIAL MENISECTOMY, DEBRIEDMENT OF LATERAL  FEMORAL CONDYLE;  Surgeon: Real Carias M.D.;  Location: [facility];  Service:    • LATERAL RELEASE Right 2016    Procedure: LATERAL RELEASE;  Surgeon: Real Carias M.D.;  Location: [facility];  Service:    • TRIGGER FINGER RELEASE Bilateral 2016    Procedure: TRIGGER FINGER RELEASE-RING;  Surgeon: Brett Maddox M.D.;  Location: [facility];  Service:    • TRIGGER FINGER RELEASE  2012    Performed by Brett Maddox M.D. at [facility]   • COLONOSCOPY  2011   • CATARACT EXTRACTION WITH IOL Left 3/2009    left   • TUBAL LIGATION     • BREAST BIOPSY      multiple breast biopsies   • TONSILLECTOMY  as child     Social History   Substance Use Topics   • Smoking status: Never Smoker   • Smokeless tobacco: Never Used   • Alcohol use No     Social History     Social History Narrative    Works as a psychiatric nurse      Family History   Problem Relation Age of Onset   • Stroke Father    • Hyperlipidemia Brother    • Other Brother      aortic aneurysm repair   • Cancer Maternal Aunt      Family Status   Relation Status   • Father     stroke   • Mother     multiple organ failure   • Brother Alive   • Maternal Aunt      Allergies: Sulfa  "drugs    ROS  As per HPI above. All other systems reviewed and negative.        Objective   Blood pressure 118/80, pulse 74, temperature 36.4 °C (97.6 °F), resp. rate 12, height 1.651 m (5' 5\"), weight 74.4 kg (164 lb), SpO2 96 %. Body mass index is 27.29 kg/m².    General: alert and oriented, pleasant, cooperative  HEENT: Normocephalic, atraumatic. No thyroid masses. Oropharynx clear without exudate or injection.   Cardiovascular: regular rate and rhythm, normal S1/S2  Pulmonary: lungs clear to auscultation bilaterally  Skin: mouth sores of tongue and mucosa   Psychiatric: appropriate mood and affect. Good insight and appropriate judgment     Assessment and Plan   The following treatment plan was discussed     1. Hypothyroidism, unspecified type  Is doing very well on levothyroxine 100 mcg. She doesn't tolerate dose titrations down based on labs alone. She had been seeing an endocrinologist at [facility] for quite some time and they had determined that she does well on levothyroxine 100 mcg.     2. Constipation, unspecified constipation type  Improving, takes Linzess very infrequently to help with her symptoms.     3. Dyslipidemia  Lab Results   Component Value Date/Time    CHOLSTRLTOT 157 09/15/2017 08:41 AM    LDL 80 09/15/2017 08:41 AM    HDL 52 09/15/2017 08:41 AM    TRIGLYCERIDE 123 09/15/2017 08:41 AM     Her lipids as of 9/2017 were at goal on atorvastatin 20 mg, will cut down dose in half to 10 mg and re-assess at next follow up visit in 4 months.   - atorvastatin (LIPITOR) 10 MG Tab; Take 1 Tab by mouth every day.  Dispense: 90 Tab; Refill: 3    4. Mouth sores  After an extensive work up with several doctors including allergists it was determined that her mouth sores were an allergy to [place]     5. Essential hypertension  Currently at goal, blood pressure today 118/80, on amlodipine 5 mg daily. Continue amlodipine 5 mg daily, she didn't tolerate coming off of this when attempted at last visit 11/2017. "     Healthcare maintenance     There are no preventive care reminders to display for this patient.    Return in about 4 months (around 5/9/2018).    Jose C Caldwell MD  Internal Medicine  [facility]

## 2018-03-13 DIAGNOSIS — E78.5 DYSLIPIDEMIA: ICD-10-CM

## 2018-03-13 RX ORDER — LEVOTHYROXINE SODIUM 0.1 MG/1
TABLET ORAL
Qty: 90 TAB | Refills: 3 | Status: SHIPPED | OUTPATIENT
Start: 2018-03-13 | End: 2018-04-27 | Stop reason: SDUPTHER

## 2018-03-13 RX ORDER — AMLODIPINE BESYLATE 5 MG/1
5 TABLET ORAL DAILY
Qty: 90 TAB | Refills: 1 | Status: SHIPPED | OUTPATIENT
Start: 2018-03-13 | End: 2018-04-27 | Stop reason: SDUPTHER

## 2018-03-13 RX ORDER — ATORVASTATIN CALCIUM 10 MG/1
10 TABLET, FILM COATED ORAL DAILY
Qty: 90 TAB | Refills: 3 | Status: SHIPPED | OUTPATIENT
Start: 2018-03-13 | End: 2018-04-27 | Stop reason: SDUPTHER

## 2018-03-13 NOTE — TELEPHONE ENCOUNTER
Pt has had OV within the 12 month protocol and lipid panel is current. 6 month supply sent to pharmacy of [facility] and 12 months of others. endocrinology has decided that pt is stable on levo 100mcg.   Lab Results   Component Value Date/Time    CHOLSTRLTOT 157 09/15/2017 08:41 AM    LDL 80 09/15/2017 08:41 AM    HDL 52 09/15/2017 08:41 AM    TRIGLYCERIDE 123 09/15/2017 08:41 AM       Lab Results   Component Value Date/Time    SODIUM 142 09/15/2017 08:41 AM    POTASSIUM 4.2 09/15/2017 08:41 AM    CHLORIDE 107 09/15/2017 08:41 AM    CO2 29 09/15/2017 08:41 AM    GLUCOSE 100 (H) 09/15/2017 08:41 AM    BUN 21 09/15/2017 08:41 AM    CREATININE 0.73 09/15/2017 08:41 AM     Lab Results   Component Value Date/Time    ALKPHOSPHAT 75 09/15/2017 08:41 AM    ASTSGOT 17 09/15/2017 08:41 AM    ALTSGPT 13 09/15/2017 08:41 AM    TBILIRUBIN 0.5 09/15/2017 08:41 AM

## 2018-03-13 NOTE — TELEPHONE ENCOUNTER
Patient requests change in pharmacy.    [id number] - [place], [place] - [address]  [address]  Mail Order Pharmacy (not Specialty)  [place] [id number]  Phone: [phone] Fax: [phone]

## 2018-04-25 NOTE — TELEPHONE ENCOUNTER
30 day ILR carelink remote transmission. See device report for details. Was the patient seen in the last year in this department? Yes     Does patient have an active prescription for medications requested? Yes pharmacy change    Received Request Via: Pharmacy

## 2018-04-27 DIAGNOSIS — E78.5 DYSLIPIDEMIA: ICD-10-CM

## 2018-04-27 RX ORDER — LEVOTHYROXINE SODIUM 0.1 MG/1
TABLET ORAL
Qty: 90 TAB | Refills: 2 | Status: SHIPPED | OUTPATIENT
Start: 2018-04-27 | End: 2019-03-29 | Stop reason: SDUPTHER

## 2018-04-27 RX ORDER — ATORVASTATIN CALCIUM 10 MG/1
10 TABLET, FILM COATED ORAL DAILY
Qty: 90 TAB | Refills: 2 | Status: SHIPPED | OUTPATIENT
Start: 2018-04-27 | End: 2019-04-02 | Stop reason: SDUPTHER

## 2018-04-27 RX ORDER — AMLODIPINE BESYLATE 5 MG/1
5 TABLET ORAL DAILY
Qty: 90 TAB | Refills: 2 | Status: SHIPPED | OUTPATIENT
Start: 2018-04-27 | End: 2019-06-23 | Stop reason: SDUPTHER

## 2018-05-17 ENCOUNTER — OFFICE VISIT (OUTPATIENT)
Dept: MEDICAL GROUP | Facility: PHYSICIAN GROUP | Age: 73
End: 2018-05-17
Payer: MEDICARE

## 2018-05-17 VITALS
DIASTOLIC BLOOD PRESSURE: 80 MMHG | HEIGHT: 65 IN | TEMPERATURE: 98.4 F | OXYGEN SATURATION: 96 % | RESPIRATION RATE: 16 BRPM | BODY MASS INDEX: 27.16 KG/M2 | WEIGHT: 163 LBS | SYSTOLIC BLOOD PRESSURE: 134 MMHG | HEART RATE: 77 BPM

## 2018-05-17 DIAGNOSIS — E03.9 HYPOTHYROIDISM, UNSPECIFIED TYPE: ICD-10-CM

## 2018-05-17 DIAGNOSIS — E78.5 DYSLIPIDEMIA: ICD-10-CM

## 2018-05-17 DIAGNOSIS — K59.00 CONSTIPATION, UNSPECIFIED CONSTIPATION TYPE: ICD-10-CM

## 2018-05-17 PROCEDURE — 99213 OFFICE O/P EST LOW 20 MIN: CPT | Performed by: INTERNAL MEDICINE

## 2018-05-17 ASSESSMENT — PATIENT HEALTH QUESTIONNAIRE - PHQ9: CLINICAL INTERPRETATION OF PHQ2 SCORE: 0

## 2018-05-17 NOTE — PROGRESS NOTES
PRIMARY CARE CLINIC FOLLOW UP VISIT  Chief Complaint   Patient presents with   • Hypothyroidism   • Hypertension   • Constipation     History of Present Illness     Constipation  She uses Linzess periodically because she is afraid they are habit forming. She uses Linzess about once a week. Other times of the week she takes milk of magnesia. She doesn't drink water but she drinks a pitcher of iced tea a day.     Dyslipidemia  Decreased her atorvastatin from 20 mg to 10 mg daily in January.     Hypothyroidism  She is doing well on levothyroxine 100 mcg as per an endocrinologist's recommendation that she saw a while back. She feels well on this dose. Says the constipation has always been an issue despite her levothyroxine dose.     Current Outpatient Prescriptions   Medication Sig Dispense Refill   • amLODIPine (NORVASC) 5 MG Tab Take 1 Tab by mouth every day. 90 Tab 2   • atorvastatin (LIPITOR) 10 MG Tab Take 1 Tab by mouth every day. 90 Tab 2   • levothyroxine (SYNTHROID) 100 MCG Tab TAKE ONE TABLET BY MOUTH ONCE DAILY IN THE MORNING ON  AN  EMPTY  STOMACH 90 Tab 2   • Cholecalciferol (VITAMIN D3) 5000 units Cap Take 1 Cap by mouth every day.     • Linaclotide (LINZESS) 145 MCG Cap Take 1 Cap by mouth 1 time daily as needed. 30 Cap 0   • latanoprost (XALATAN) 0.005 % Solution Place 1 Drop in both eyes every evening.     • lidocaine viscous 2% (XYLOCAINE) 2 % Solution 10 mL swish and spit every 6 hours as needed for mouth pain 100 mL 1   • Calcium-Magnesium-Vitamin D (CALCIUM 500 PO) Take  by mouth every day.     • aspirin 81 MG tablet Take 81 mg by mouth every day.     • vitamin e (VITAMIN E) 400 UNIT CAPS Take 400 Units by mouth every day.       • Omega-3 Fatty Acids (FISH OIL) 1000 MG CPDR Take 1,200 mg by mouth every day.     • timolol gel-forming (TIMOPTIC-XR) 0.5 % GEL FORMING SOLUTION Place 1 Drop in both eyes every day.     • fluocinonide (LIDEX) 0.05 % Ointment Apply thinly to oral lesions on the tongue and  mouth 4-6 times per day. 1 Tube 1     No current facility-administered medications for this visit.      Past Medical History:   Diagnosis Date   • Coanuqkbj-Padysal-Ejiqxo disease 2016   • Cataract 10/2014    Left Eye IOL   • Constipation 2017   • Dyslipidemia 2016   • Essential hypertension 2016   • Hypertension    • Hypothyroidism    • Impaired fasting blood sugar    • Osteopenia    • Trigger finger, acquired 2012     ICD-10 transition     Past Surgical History:   Procedure Laterality Date   • KNEE ARTHROSCOPY Right 2016    Procedure: KNEE ARTHROSCOPY, MEDIAL MENISECTOMY, DEBRIEDMENT OF LATERAL  FEMORAL CONDYLE;  Surgeon: Real Carias M.D.;  Location: [facility];  Service:    • LATERAL RELEASE Right 2016    Procedure: LATERAL RELEASE;  Surgeon: Real Carias M.D.;  Location: [facility];  Service:    • TRIGGER FINGER RELEASE Bilateral 2016    Procedure: TRIGGER FINGER RELEASE-RING;  Surgeon: Brett Maddox M.D.;  Location: [facility];  Service:    • TRIGGER FINGER RELEASE  2012    Performed by Brett Maddox M.D. at [facility]   • COLONOSCOPY  2011   • CATARACT EXTRACTION WITH IOL Left 3/2009    left   • TUBAL LIGATION     • BREAST BIOPSY      multiple breast biopsies   • TONSILLECTOMY  as child     Social History   Substance Use Topics   • Smoking status: Never Smoker   • Smokeless tobacco: Never Used   • Alcohol use No     Social History     Social History Narrative    Works as a psychiatric nurse      Family History   Problem Relation Age of Onset   • Stroke Father    • Hyperlipidemia Brother    • Other Brother      aortic aneurysm repair   • Cancer Maternal Aunt      Family Status   Relation Status   • Father     stroke   • Mother     multiple organ failure   • Brother Alive   • Maternal Aunt      Allergies: Sulfa drugs    ROS  As per HPI above. All other systems reviewed and  "negative.        Objective   Blood pressure 134/80, pulse 77, temperature 36.9 °C (98.4 °F), resp. rate 16, height 1.651 m (5' 5\"), weight 73.9 kg (163 lb), SpO2 96 %. Body mass index is 27.12 kg/m².    General: alert and oriented, pleasant, cooperative  HEENT: Normocephalic, atraumatic. No thyroid masses. Oropharynx clear without exudate or injection.   Cardiovascular: regular rate and rhythm, normal S1/S2  Pulmonary: lungs clear to auscultation bilaterally  Gastrointestinal: no tenderness to palpation. No hepatosplenomegaly. Bowel sounds normoactive  Lymphatics: no cervical or supraclavicular lymphadenopathy   Skin: warm and dry, no lesions or rashes  Psychiatric: appropriate mood and affect. Good insight and appropriate judgment     Assessment and Plan   The following treatment plan was discussed     1. Constipation, unspecified constipation type  Advised her to be sure she takes adequate water, fiber intake (fruits, vegetables) and keeps active. She's currently taking Linzess about once a week.     2. Dyslipidemia  Lab Results   Component Value Date/Time    CHOLSTRLTOT 157 09/15/2017 08:41 AM    LDL 80 09/15/2017 08:41 AM    HDL 52 09/15/2017 08:41 AM    TRIGLYCERIDE 123 09/15/2017 08:41 AM     Based on her lipids as above had reduced her dose of atorvastatin from 20 to 10 mg in January 2018, will recheck lipids again in the fall at next visit.     3. Hypothyroidism, unspecified type  She does well on the levothyroxine 100 mcg and wishes to continue this dose; says this was what was recommended to her by an endocrinologist she saw some time ago.       Healthcare maintenance     There are no preventive care reminders to display for this patient.    Return in about 6 months (around 11/17/2018).    Jose C Caldwell MD  Internal Medicine  [facility]                   "

## 2018-05-17 NOTE — ASSESSMENT & PLAN NOTE
She uses Linzess periodically because she is afraid they are habit forming. She uses Linzess about once a week. Other times of the week she takes milk of magnesia. She doesn't drink water but she drinks a pitcher of iced tea a day.

## 2018-05-17 NOTE — ASSESSMENT & PLAN NOTE
She is doing well on levothyroxine 100 mcg as per an endocrinologist's recommendation that she saw a while back. She feels well on this dose. Says the constipation has always been an issue despite her levothyroxine dose.

## 2018-09-18 RX ORDER — LINACLOTIDE 145 UG/1
CAPSULE, GELATIN COATED ORAL
Qty: 90 CAP | Refills: 0 | Status: SHIPPED | OUTPATIENT
Start: 2018-09-18 | End: 2019-09-19 | Stop reason: SDUPTHER

## 2018-09-18 NOTE — TELEPHONE ENCOUNTER
Was the patient seen in the last year in this department? Yes    Does patient have an active prescription for medications requested? No     Received Request Via: Pharmacy      Pt met protocol?: Yes, OV 5/18

## 2018-12-05 ENCOUNTER — HOSPITAL ENCOUNTER (OUTPATIENT)
Dept: RADIOLOGY | Facility: MEDICAL CENTER | Age: 73
End: 2018-12-05
Attending: INTERNAL MEDICINE
Payer: MEDICARE

## 2018-12-05 DIAGNOSIS — Z12.31 BREAST CANCER SCREENING BY MAMMOGRAM: ICD-10-CM

## 2018-12-05 PROCEDURE — 77067 SCR MAMMO BI INCL CAD: CPT

## 2018-12-07 ENCOUNTER — OFFICE VISIT (OUTPATIENT)
Dept: MEDICAL GROUP | Facility: PHYSICIAN GROUP | Age: 73
End: 2018-12-07
Payer: MEDICARE

## 2018-12-07 ENCOUNTER — HOSPITAL ENCOUNTER (OUTPATIENT)
Dept: LAB | Facility: MEDICAL CENTER | Age: 73
End: 2018-12-07
Attending: INTERNAL MEDICINE
Payer: MEDICARE

## 2018-12-07 VITALS
HEART RATE: 73 BPM | HEIGHT: 65 IN | OXYGEN SATURATION: 98 % | BODY MASS INDEX: 28.46 KG/M2 | TEMPERATURE: 98.6 F | RESPIRATION RATE: 16 BRPM | SYSTOLIC BLOOD PRESSURE: 138 MMHG | DIASTOLIC BLOOD PRESSURE: 82 MMHG | WEIGHT: 170.8 LBS

## 2018-12-07 DIAGNOSIS — K59.00 CONSTIPATION, UNSPECIFIED CONSTIPATION TYPE: ICD-10-CM

## 2018-12-07 DIAGNOSIS — E03.9 HYPOTHYROIDISM, UNSPECIFIED TYPE: ICD-10-CM

## 2018-12-07 DIAGNOSIS — E78.5 DYSLIPIDEMIA: ICD-10-CM

## 2018-12-07 DIAGNOSIS — K13.79 MOUTH SORES: ICD-10-CM

## 2018-12-07 LAB
ALBUMIN SERPL BCP-MCNC: 3.9 G/DL (ref 3.2–4.9)
ALBUMIN/GLOB SERPL: 1.4 G/DL
ALP SERPL-CCNC: 79 U/L (ref 30–99)
ALT SERPL-CCNC: 13 U/L (ref 2–50)
ANION GAP SERPL CALC-SCNC: 5 MMOL/L (ref 0–11.9)
AST SERPL-CCNC: 17 U/L (ref 12–45)
BASOPHILS # BLD AUTO: 0.7 % (ref 0–1.8)
BASOPHILS # BLD: 0.06 K/UL (ref 0–0.12)
BILIRUB SERPL-MCNC: 0.5 MG/DL (ref 0.1–1.5)
BUN SERPL-MCNC: 17 MG/DL (ref 8–22)
CALCIUM SERPL-MCNC: 9.5 MG/DL (ref 8.5–10.5)
CHLORIDE SERPL-SCNC: 107 MMOL/L (ref 96–112)
CHOLEST SERPL-MCNC: 182 MG/DL (ref 100–199)
CO2 SERPL-SCNC: 28 MMOL/L (ref 20–33)
CREAT SERPL-MCNC: 0.77 MG/DL (ref 0.5–1.4)
EOSINOPHIL # BLD AUTO: 0.31 K/UL (ref 0–0.51)
EOSINOPHIL NFR BLD: 3.7 % (ref 0–6.9)
ERYTHROCYTE [DISTWIDTH] IN BLOOD BY AUTOMATED COUNT: 45.8 FL (ref 35.9–50)
FASTING STATUS PATIENT QL REPORTED: NORMAL
GLOBULIN SER CALC-MCNC: 2.7 G/DL (ref 1.9–3.5)
GLUCOSE SERPL-MCNC: 97 MG/DL (ref 65–99)
HCT VFR BLD AUTO: 40.4 % (ref 37–47)
HDLC SERPL-MCNC: 52 MG/DL
HGB BLD-MCNC: 13.3 G/DL (ref 12–16)
IMM GRANULOCYTES # BLD AUTO: 0.01 K/UL (ref 0–0.11)
IMM GRANULOCYTES NFR BLD AUTO: 0.1 % (ref 0–0.9)
LDLC SERPL CALC-MCNC: 102 MG/DL
LYMPHOCYTES # BLD AUTO: 1.86 K/UL (ref 1–4.8)
LYMPHOCYTES NFR BLD: 22.2 % (ref 22–41)
MCH RBC QN AUTO: 31.7 PG (ref 27–33)
MCHC RBC AUTO-ENTMCNC: 32.9 G/DL (ref 33.6–35)
MCV RBC AUTO: 96.2 FL (ref 81.4–97.8)
MONOCYTES # BLD AUTO: 0.58 K/UL (ref 0–0.85)
MONOCYTES NFR BLD AUTO: 6.9 % (ref 0–13.4)
NEUTROPHILS # BLD AUTO: 5.57 K/UL (ref 2–7.15)
NEUTROPHILS NFR BLD: 66.4 % (ref 44–72)
NRBC # BLD AUTO: 0 K/UL
NRBC BLD-RTO: 0 /100 WBC
PLATELET # BLD AUTO: 245 K/UL (ref 164–446)
PMV BLD AUTO: 11.4 FL (ref 9–12.9)
POTASSIUM SERPL-SCNC: 4 MMOL/L (ref 3.6–5.5)
PROT SERPL-MCNC: 6.6 G/DL (ref 6–8.2)
RBC # BLD AUTO: 4.2 M/UL (ref 4.2–5.4)
SODIUM SERPL-SCNC: 140 MMOL/L (ref 135–145)
T4 FREE SERPL-MCNC: 1.03 NG/DL (ref 0.53–1.43)
TRIGL SERPL-MCNC: 142 MG/DL (ref 0–149)
TSH SERPL DL<=0.005 MIU/L-ACNC: 0.16 UIU/ML (ref 0.38–5.33)
WBC # BLD AUTO: 8.4 K/UL (ref 4.8–10.8)

## 2018-12-07 PROCEDURE — 85025 COMPLETE CBC W/AUTO DIFF WBC: CPT

## 2018-12-07 PROCEDURE — 99213 OFFICE O/P EST LOW 20 MIN: CPT | Performed by: INTERNAL MEDICINE

## 2018-12-07 PROCEDURE — 80053 COMPREHEN METABOLIC PANEL: CPT

## 2018-12-07 PROCEDURE — 84443 ASSAY THYROID STIM HORMONE: CPT

## 2018-12-07 PROCEDURE — 36415 COLL VENOUS BLD VENIPUNCTURE: CPT

## 2018-12-07 PROCEDURE — 80061 LIPID PANEL: CPT

## 2018-12-07 PROCEDURE — 84439 ASSAY OF FREE THYROXINE: CPT

## 2018-12-07 NOTE — ASSESSMENT & PLAN NOTE
She has tried many powders and OTC pills but nothing works. Linzess works but she only takes it once week since she's afraid it may be habit forming. Eats a pumpkin and flax seed cereal.

## 2018-12-07 NOTE — PROGRESS NOTES
PRIMARY CARE CLINIC FOLLOW UP VISIT  Chief Complaint   Patient presents with   • Hypothyroidism   • Other     Dyslipidemia   • Hypertension     History of Present Illness     Mouth sores  Has had recurrence of her mouth sores. Would like a refill of her lidocaine solution.     Constipation  She has tried many powders and OTC pills but nothing works. Linzess works but she only takes it once week since she's afraid it may be habit forming. Eats a pumpkin and flax seed cereal.     Dyslipidemia  Last FLP checked 9/2017 and under goal control with atorvastatin 10 mg daily.     Hypothyroidism  Taking levothyroxine 100 mcg daily.     Current Outpatient Prescriptions   Medication Sig Dispense Refill   • lidocaine viscous 2% (XYLOCAINE) 2 % Solution 10 mL swish and spit every 6 hours as needed for mouth pain 100 mL 1   • LINZESS 145 MCG Cap TAKE ONE CAPSULE BY MOUTH ONCE DAILY AS NEEDED 90 Cap 0   • amLODIPine (NORVASC) 5 MG Tab Take 1 Tab by mouth every day. 90 Tab 2   • atorvastatin (LIPITOR) 10 MG Tab Take 1 Tab by mouth every day. 90 Tab 2   • levothyroxine (SYNTHROID) 100 MCG Tab TAKE ONE TABLET BY MOUTH ONCE DAILY IN THE MORNING ON  AN  EMPTY  STOMACH 90 Tab 2   • Cholecalciferol (VITAMIN D3) 5000 units Cap Take 1 Cap by mouth every day.     • latanoprost (XALATAN) 0.005 % Solution Place 1 Drop in both eyes every evening.     • timolol gel-forming (TIMOPTIC-XR) 0.5 % GEL FORMING SOLUTION Place 1 Drop in both eyes every day.     • fluocinonide (LIDEX) 0.05 % Ointment Apply thinly to oral lesions on the tongue and mouth 4-6 times per day. 1 Tube 1   • Calcium-Magnesium-Vitamin D (CALCIUM 500 PO) Take  by mouth every day.     • aspirin 81 MG tablet Take 81 mg by mouth every day.     • vitamin e (VITAMIN E) 400 UNIT CAPS Take 400 Units by mouth every day.       • Omega-3 Fatty Acids (FISH OIL) 1000 MG CPDR Take 1,200 mg by mouth every day.       No current facility-administered medications for this visit.      Past Medical  History:   Diagnosis Date   • Jdflyalej-Ydqxcap-Chnntu disease 2016   • Cataract 10/2014    Left Eye IOL   • Constipation 2017   • Dyslipidemia 2016   • Essential hypertension 2016   • Hypertension    • Hypothyroidism    • Impaired fasting blood sugar    • Osteopenia    • Trigger finger, acquired 2012     ICD-10 transition     Past Surgical History:   Procedure Laterality Date   • KNEE ARTHROSCOPY Right 2016    Procedure: KNEE ARTHROSCOPY, MEDIAL MENISECTOMY, DEBRIEDMENT OF LATERAL  FEMORAL CONDYLE;  Surgeon: Real Carias M.D.;  Location: [facility];  Service:    • LATERAL RELEASE Right 2016    Procedure: LATERAL RELEASE;  Surgeon: Real Carias M.D.;  Location: [facility];  Service:    • TRIGGER FINGER RELEASE Bilateral 2016    Procedure: TRIGGER FINGER RELEASE-RING;  Surgeon: Brett Maddox M.D.;  Location: [facility];  Service:    • TRIGGER FINGER RELEASE  2012    Performed by Brett Maddox M.D. at [facility]   • COLONOSCOPY  2011   • CATARACT EXTRACTION WITH IOL Left 3/2009    left   • TUBAL LIGATION     • BREAST BIOPSY      multiple breast biopsies   • TONSILLECTOMY  as child     Social History   Substance Use Topics   • Smoking status: Never Smoker   • Smokeless tobacco: Never Used   • Alcohol use No     Social History     Social History Narrative    Works as a psychiatric nurse      Family History   Problem Relation Age of Onset   • Stroke Father    • Hyperlipidemia Brother    • Other Brother         aortic aneurysm repair   • Cancer Maternal Aunt      Family Status   Relation Status   • Fa         stroke   • Mo         multiple organ failure   • Bro Alive   • MAunt (Not Specified)     Allergies: Sulfa drugs    ROS  As per HPI above. All other systems reviewed and negative.        Objective   Blood pressure 138/82, pulse 73, temperature 37 °C (98.6 °F), temperature source  "Temporal, resp. rate 16, height 1.651 m (5' 5\"), weight 77.5 kg (170 lb 12.8 oz), SpO2 98 %. Body mass index is 28.42 kg/m².    General: alert and oriented, pleasant, cooperative  HEENT: Normocephalic, atraumatic. No thyroid masses. Oropharynx clear without exudate or injection.   Cardiovascular: regular rate and rhythm, normal S1/S2  Pulmonary: lungs clear to auscultation bilaterally  Gastrointestinal: no tenderness to palpation. No hepatosplenomegaly. Bowel sounds normoactive  Lymphatics: no cervical or supraclavicular lymphadenopathy   Skin: warm and dry, no lesions or rashes  Psychiatric: appropriate mood and affect. Good insight and appropriate judgment     Assessment and Plan   The following treatment plan was discussed     1. Mouth sores  - lidocaine viscous 2% (XYLOCAINE) 2 % Solution; 10 mL swish and spit every 6 hours as needed for mouth pain  Dispense: 100 mL; Refill: 1    2. Constipation, unspecified constipation type  Advised gentle methods: prune juice/prunes, increasing fiber intake. May take linzess every 2-3 days as needed.     3. Dyslipidemia  - COMP METABOLIC PANEL; Future  - CBC WITH DIFFERENTIAL; Future  - Lipid Profile; Future    4. Hypothyroidism, unspecified type  - TSH WITH REFLEX TO FT4; Future      Healthcare maintenance     Health Maintenance Due   Topic Date Due   • Annual Wellness Visit  1945   • IMM DTaP/Tdap/Td Vaccine (1 - Tdap) 12/05/1963   • COLONOSCOPY  12/05/1994       Return in about 6 months (around 6/7/2019).    Jose C Caldwell MD  Internal Medicine  [facility]                   "

## 2019-04-01 RX ORDER — LEVOTHYROXINE SODIUM 0.1 MG/1
TABLET ORAL
Qty: 90 TAB | Refills: 1 | Status: SHIPPED | OUTPATIENT
Start: 2019-04-01 | End: 2019-10-07 | Stop reason: SDUPTHER

## 2019-04-01 NOTE — TELEPHONE ENCOUNTER
Was the patient seen in the last year in this department? Yes    Does patient have an active prescription for medications requested? No     Received Request Via: Pharmacy    Pt met protocol?: Yes     Last OV 12/2018    TSH   Date Value Ref Range Status   12/07/2018 0.160 (L) 0.380 - 5.330 uIU/mL Final     Comment:     Please note new reference ranges effective 12/14/2017 10:00 AM  Pregnant Females, 1st Trimester  0.050-3.700  Pregnant Females, 2nd Trimester  0.310-4.350  Pregnant Females, 3rd Trimester  0.410-5.180

## 2019-04-02 DIAGNOSIS — E78.5 DYSLIPIDEMIA: ICD-10-CM

## 2019-04-02 NOTE — TELEPHONE ENCOUNTER
Was the patient seen in the last year in this department? Yes    Does patient have an active prescription for medications requested? No  [facility] asking for 100 day supply    Received Request Via: Insurance

## 2019-04-03 RX ORDER — ATORVASTATIN CALCIUM 10 MG/1
10 TABLET, FILM COATED ORAL DAILY
Qty: 100 TAB | Refills: 1 | Status: SHIPPED | OUTPATIENT
Start: 2019-04-03 | End: 2019-12-03 | Stop reason: SDUPTHER

## 2019-04-03 NOTE — TELEPHONE ENCOUNTER
Was the patient seen in the last year in this department? Yes    Does patient have an active prescription for medications requested? No     Received Request Via: Pharmacy      Pt met protocol?: Yes, lipid labs and ov 12/18 #100ds for [facility]

## 2019-04-03 NOTE — TELEPHONE ENCOUNTER
Pt has had OV within the 12 month protocol and lipid panel is current. 6 month supply sent to pharmacy.   Lab Results   Component Value Date/Time    CHOLSTRLTOT 182 12/07/2018 09:48 AM     (H) 12/07/2018 09:48 AM    HDL 52 12/07/2018 09:48 AM    TRIGLYCERIDE 142 12/07/2018 09:48 AM       Lab Results   Component Value Date/Time    SODIUM 140 12/07/2018 09:48 AM    POTASSIUM 4.0 12/07/2018 09:48 AM    CHLORIDE 107 12/07/2018 09:48 AM    CO2 28 12/07/2018 09:48 AM    GLUCOSE 97 12/07/2018 09:48 AM    BUN 17 12/07/2018 09:48 AM    CREATININE 0.77 12/07/2018 09:48 AM     Lab Results   Component Value Date/Time    ALKPHOSPHAT 79 12/07/2018 09:48 AM    ASTSGOT 17 12/07/2018 09:48 AM    ALTSGPT 13 12/07/2018 09:48 AM    TBILIRUBIN 0.5 12/07/2018 09:48 AM

## 2019-04-11 ENCOUNTER — TELEPHONE (OUTPATIENT)
Dept: URGENT CARE | Facility: PHYSICIAN GROUP | Age: 74
End: 2019-04-11

## 2019-05-21 ENCOUNTER — TELEPHONE (OUTPATIENT)
Dept: MEDICAL GROUP | Facility: PHYSICIAN GROUP | Age: 74
End: 2019-05-21

## 2019-05-21 NOTE — TELEPHONE ENCOUNTER
ESTABLISHED PATIENT PRE-VISIT PLANNING     Patient was NOT contacted to complete PVP.     1.  Reviewed notes from the last few office visits within the medical group: Yes    2.  If any orders were placed at last visit or intended to be done for this visit (i.e. 6 mos follow-up), do we have Results/Consult Notes?        •  Labs - Labs ordered, completed on 12/07/2018 and results are in chart.       •  Imaging - Imaging was not ordered at last office visit.       •  Referrals - No referrals were ordered at last office visit.    3. Is this appointment scheduled as a Hospital Follow-Up? No    4.  Immunizations were updated in [facility] using WebIZ?: Yes       •  Web Iz Recommendations: TDAP, VARICELLA (Chicken Pox)  and SHINGRIX (Shingles)    5.  Patient is due for the following Health Maintenance Topics:   Health Maintenance Due   Topic Date Due   • Annual Wellness Visit  1945   • IMM DTaP/Tdap/Td Vaccine (1 - Tdap) 12/05/1963   • IMM ZOSTER VACCINES (1 of 2) 12/05/1994     6. Orders for overdue Health Maintenance topics pended in Pre-Charting? NO    7.  AHA (MDX) form printed for Provider? YES    8.  Patient was NOT informed to arrive 15 min prior to their scheduled appointment and bring in their medication bottles.

## 2019-05-24 ENCOUNTER — OFFICE VISIT (OUTPATIENT)
Dept: MEDICAL GROUP | Facility: PHYSICIAN GROUP | Age: 74
End: 2019-05-24
Payer: MEDICARE

## 2019-05-24 VITALS
HEART RATE: 74 BPM | RESPIRATION RATE: 12 BRPM | TEMPERATURE: 98.2 F | SYSTOLIC BLOOD PRESSURE: 124 MMHG | HEIGHT: 65 IN | OXYGEN SATURATION: 95 % | WEIGHT: 171 LBS | DIASTOLIC BLOOD PRESSURE: 72 MMHG | BODY MASS INDEX: 28.49 KG/M2

## 2019-05-24 DIAGNOSIS — C44.310 FACIAL BASAL CELL CANCER: ICD-10-CM

## 2019-05-24 DIAGNOSIS — M79.18 BUTTOCK PAIN: ICD-10-CM

## 2019-05-24 PROBLEM — M54.30 SCIATICA: Status: ACTIVE | Noted: 2019-05-24

## 2019-05-24 PROCEDURE — 99214 OFFICE O/P EST MOD 30 MIN: CPT | Performed by: INTERNAL MEDICINE

## 2019-05-24 PROCEDURE — 8041 PR SCP AHA: Performed by: INTERNAL MEDICINE

## 2019-05-24 RX ORDER — BRIMONIDINE TARTRATE 2 MG/ML
1 SOLUTION/ DROPS OPHTHALMIC 2 TIMES DAILY
Refills: 4 | COMMUNITY
Start: 2019-05-13 | End: 2019-12-03

## 2019-05-24 NOTE — ASSESSMENT & PLAN NOTE
At the end of March she and her family went to [place]. She tripped and fell backwards there onto cement and hurt her buttocks and her knee. About a week after that trip she has constant pain of her left buttock that is only relieved when she sleeps. Sitting makes her symptoms worse. Denies numbness, tingling. Pain is sharp, tingling.

## 2019-05-24 NOTE — ASSESSMENT & PLAN NOTE
Gained weight during a winter cruise and hasn't been able to get it off. Is down to two meals a day but still isn't able to lose weight. Doesn't exercise. Eats a lot of meat and has been having Atkins protein bars.

## 2019-05-24 NOTE — ASSESSMENT & PLAN NOTE
At the end of March she and her family went to [place]. She tripped and fell backwards there onto cement and hurt her buttocks and her knee. About a week after that trip she has constant pain of her left buttock that is only relieved when she sleeps. Sitting makes her symptoms worse. Denies numbness, tingling. Pain is sharp, tingling. Tried different NSAID brands but they didn't help.

## 2019-05-24 NOTE — PROGRESS NOTES
PRIMARY CARE CLINIC FOLLOW UP VISIT  Chief Complaint   Patient presents with   • Pain     sciatic/left x 2 months   • Procedure     04/23/19 mohs   • Weight Gain     History of Present Illness     Buttock pain  At the end of March she and her family went to [place]. She tripped and fell backwards there onto cement and hurt her buttocks and her knee. About a week after that trip she has constant pain of her left buttock that is only relieved when she sleeps. Sitting makes her symptoms worse. Denies numbness, tingling. Pain is sharp, tingling. Tried different NSAID brands but they didn't help.     Facial basal cell cancer  Following with Dr. Majano, had Mohs for facial BCC 4/23/2019. Has healed well.     BMI 28.0-28.9,adult  Gained weight during a winter cruise and hasn't been able to get it off. Is down to two meals a day but still isn't able to lose weight. Doesn't exercise. Eats a lot of meat and has been having Atkins protein bars.     Current Outpatient Prescriptions   Medication Sig Dispense Refill   • brimonidine (ALPHAGAN) 0.2 % Solution Place 1 Drop in both eyes 2 Times a Day.  4   • atorvastatin (LIPITOR) 10 MG Tab Take 1 Tab by mouth every day. 100 Tab 1   • levothyroxine (SYNTHROID) 100 MCG Tab TAKE 1 TABLET BY MOUTH ONCE DAILY IN THE MORNING ON AN EMPTY STOMACH 90 Tab 1   • LINZESS 145 MCG Cap TAKE ONE CAPSULE BY MOUTH ONCE DAILY AS NEEDED 90 Cap 0   • amLODIPine (NORVASC) 5 MG Tab Take 1 Tab by mouth every day. 90 Tab 2   • Cholecalciferol (VITAMIN D3) 5000 units Cap Take 1 Cap by mouth every day.     • aspirin 81 MG tablet Take 81 mg by mouth every day.     • vitamin e (VITAMIN E) 400 UNIT CAPS Take 400 Units by mouth every day.       • Omega-3 Fatty Acids (FISH OIL) 1000 MG CPDR Take 1,200 mg by mouth every day.     • lidocaine viscous 2% (XYLOCAINE) 2 % Solution 10 mL swish and spit every 6 hours as needed for mouth pain 100 mL 1   • latanoprost (XALATAN) 0.005 % Solution Place 1 Drop in both eyes  every evening.     • timolol gel-forming (TIMOPTIC-XR) 0.5 % GEL FORMING SOLUTION Place 1 Drop in both eyes every day.     • fluocinonide (LIDEX) 0.05 % Ointment Apply thinly to oral lesions on the tongue and mouth 4-6 times per day. 1 Tube 1   • Calcium-Magnesium-Vitamin D (CALCIUM 500 PO) Take  by mouth every day.       No current facility-administered medications for this visit.      Past Medical History:   Diagnosis Date   • Jslcaqxpy-Pxpzjcr-Xmxlpy disease 6/8/2016   • Cataract 10/2014    Left Eye IOL   • Constipation 11/6/2017   • Dyslipidemia 4/12/2016   • Essential hypertension 4/12/2016   • Hypertension    • Hypothyroidism    • Impaired fasting blood sugar    • Osteopenia    • Trigger finger, acquired 12/26/2012     ICD-10 transition     Past Surgical History:   Procedure Laterality Date   • KNEE ARTHROSCOPY Right 6/8/2016    Procedure: KNEE ARTHROSCOPY, MEDIAL MENISECTOMY, DEBRIEDMENT OF LATERAL  FEMORAL CONDYLE;  Surgeon: Real Carias M.D.;  Location: [facility];  Service:    • LATERAL RELEASE Right 6/8/2016    Procedure: LATERAL RELEASE;  Surgeon: Real Carias M.D.;  Location: [facility];  Service:    • TRIGGER FINGER RELEASE Bilateral 4/19/2016    Procedure: TRIGGER FINGER RELEASE-RING;  Surgeon: Brett Maddox M.D.;  Location: [facility];  Service:    • TRIGGER FINGER RELEASE  12/26/2012    Performed by Brett Maddox M.D. at [facility]   • COLONOSCOPY  8/2011   • CATARACT EXTRACTION WITH IOL Left 3/2009    left   • TUBAL LIGATION  1980's   • BREAST BIOPSY      multiple breast biopsies   • TONSILLECTOMY  as child     Social History   Substance Use Topics   • Smoking status: Never Smoker   • Smokeless tobacco: Never Used   • Alcohol use No     Social History     Social History Narrative    Works as a psychiatric nurse      Family History   Problem Relation Age of Onset   • Stroke Father    • Hyperlipidemia Brother    • Other Brother        "  aortic aneurysm repair   • Cancer Maternal Aunt      Family Status   Relation Status   • Fa         stroke   • Mo         multiple organ failure   • Bro Alive   • MAunt (Not Specified)     Allergies: Sulfa drugs    ROS  As per HPI above. All other systems reviewed and negative.        Objective   /72 (BP Location: Left arm, Patient Position: Sitting, BP Cuff Size: Adult)   Pulse 74   Temp 36.8 °C (98.2 °F) (Temporal)   Resp 12   Ht 1.651 m (5' 5\")   Wt 77.6 kg (171 lb)   SpO2 95%  Body mass index is 28.46 kg/m².    General: alert and oriented, pleasant, cooperative  HEENT: Normocephalic, atraumatic.   Psychiatric: appropriate mood and affect. Good insight and appropriate judgment       Assessment and Plan   The following treatment plan was discussed     1. Buttock pain  Likely from sprain when she fell during her trip to [place], advised scheduled NSAIDs for at least 7-10 days and also PT.   - REFERRAL TO PHYSICAL THERAPY Reason for Therapy: Eval/Treat/Report    2. Facial basal cell cancer  Following with Dr. Majano, just recently had Mohs and is healing well.     3. BMI 28.0-28.9,adult  Discussed increasing physical activity and introducing more produce/fiber into diet and more of a plant based diet.       Healthcare maintenance     Health Maintenance Due   Topic Date Due   • Annual Wellness Visit  1945   • IMM DTaP/Tdap/Td Vaccine (1 - Tdap) 1963       Return if symptoms worsen or fail to improve.    Jose C Caldwell MD  Internal Medicine  [facility]                   "

## 2019-05-30 ENCOUNTER — PHYSICAL THERAPY (OUTPATIENT)
Dept: PHYSICAL THERAPY | Facility: REHABILITATION | Age: 74
End: 2019-05-30
Attending: INTERNAL MEDICINE
Payer: MEDICARE

## 2019-05-30 DIAGNOSIS — M79.18 BUTTOCK PAIN: ICD-10-CM

## 2019-05-30 PROCEDURE — 97161 PT EVAL LOW COMPLEX 20 MIN: CPT

## 2019-05-30 PROCEDURE — 97110 THERAPEUTIC EXERCISES: CPT

## 2019-05-30 ASSESSMENT — ENCOUNTER SYMPTOMS
QUALITY: ACHING
PAIN SCALE: 5
PAIN SCALE AT HIGHEST: 9
PAIN SCALE AT LOWEST: 5
QUALITY: SHARP

## 2019-05-30 NOTE — OP THERAPY EVALUATION
Outpatient Physical Therapy  INITIAL EVALUATION    [facility] [place]  [address], [address]  [place] [address]  Phone:  [phone]  Fax:  [phone]    Date of Evaluation: 2019    Patient: Kaylee Nazario  YOB: 1945  MRN: [id number]     Referring Provider: Jose C Caldwell M.D.  [address] [place] [address], [place] [address]   Referring Diagnosis Buttock pain [M79.18]     Time Calculation  Start time: 815  Stop time: 915 Time Calculation (min): 60 minutes     Physical Therapy Occurrence Codes    Date of onset of impairment:  3/30/19   Date physical therapy care plan established or reviewed:  19   Date physical therapy treatment started:  19          Chief Complaint: buttocks pain    Visit Diagnoses     ICD-10-CM   1. Buttock pain M79.18     Subjective:   History of Present Illness:     Mechanism of injury:  Kaylee Nazario is a 73 y.o. female that presents to therapy with Left buttocks pain after a fall on sidewalk 2 months ago. Her pain does not descend down the leg. She was walking along a sidewalk and her grandson knocked into her and she fell onto her left posterior. Patient denies fevers/chills, numbness/weakness of lower extremities, bowel/bladder incontinence, saddle anesthesia.      Aggravating factors: walking difficulty as any distance is painful, sitting needs to weight shift  Relieving factors: heat    ADL limitations: unable to walk due to pain. Can walk short distances with pain relieving a bit but then increasing the more she walks.     Pain:     Current pain ratin    At best pain ratin    At worst pain ratin    Quality:  Aching and sharp      Past Medical History:   Diagnosis Date   • Mnopmjkiy-Sdixnyn-Mrlarq disease 2016   • Cataract 10/2014    Left Eye IOL   • Constipation 2017   • Dyslipidemia 2016   • Essential hypertension 2016   • Hypertension    • Hypothyroidism    • Impaired fasting blood  sugar    • Osteopenia    • Trigger finger, acquired 12/26/2012     ICD-10 transition     Past Surgical History:   Procedure Laterality Date   • KNEE ARTHROSCOPY Right 6/8/2016    Procedure: KNEE ARTHROSCOPY, MEDIAL MENISECTOMY, DEBRIEDMENT OF LATERAL  FEMORAL CONDYLE;  Surgeon: Real Carias M.D.;  Location: [facility];  Service:    • LATERAL RELEASE Right 6/8/2016    Procedure: LATERAL RELEASE;  Surgeon: Real Carias M.D.;  Location: [facility];  Service:    • TRIGGER FINGER RELEASE Bilateral 4/19/2016    Procedure: TRIGGER FINGER RELEASE-RING;  Surgeon: Brett Maddox M.D.;  Location: [facility];  Service:    • TRIGGER FINGER RELEASE  12/26/2012    Performed by Brett Maddox M.D. at [facility]   • COLONOSCOPY  8/2011   • CATARACT EXTRACTION WITH IOL Left 3/2009    left   • TUBAL LIGATION  1980's   • BREAST BIOPSY      multiple breast biopsies   • TONSILLECTOMY  as child     Social History   Substance Use Topics   • Smoking status: Never Smoker   • Smokeless tobacco: Never Used   • Alcohol use No     Family and Occupational History     Social History   • Marital status:      Spouse name: N/A   • Number of children: N/A   • Years of education: N/A     Occupational History   • psych nurse - [employer]        Objective     Neurological Testing     Reflexes   Left   Patellar (L4): trace (1+)  Achilles (S1): trace (1+)  Ankle clonus reflex: negative  Babinski sign: negative    Right   Patellar (L4): trace (1+)  Achilles (S1): trace (1+)  Ankle clonus reflex: negative  Babinski sign: negative    Myotome testing   Lumbar (left)   L2 (hip flexors): 4  L3 (knee extensors): 5  L4 (ankle dorsiflexors): 5  L5 (great toe extension): 5  S1 (ankle plantar flexors): 5    Lumbar (right)   L2 (hip flexors): 4  L3 (knee extensors): 5  L4 (ankle dorsiflexors): 5  L5 (great toe extension): 5  S1 (ankle plantar flexors): 5    Dermatome testing   Lumbar (left)    All left lumbar dermatomes intact    Lumbar (right)   All right lumbar dermatomes intact    Palpation   Left   Tenderness of the gluteus paloma and gluteus medius.     Active Range of Motion     Lumbar   Flexion: decreased (due to pain, flexion to knee)  Extension: within functional limits  Left lateral flexion: within functional limits  Right lateral flexion: within functional limits  Left rotation: within functional limits  Right rotation: within functional limits    Joint Play     Additional Joint Play Details   and UPAs WNL and non-painful to lumbar spine    Strength:      Left Hip   Planes of Motion   Flexion: 4  Extension: 3-  Abduction: 4+  Adduction: 5  External rotation: 4+  Internal rotation: 5    Right Hip   Planes of Motion   Flexion: 4  Extension: 5  Abduction: 5  Adduction: 5  External rotation: 5  Internal rotation: 5    Tests       Lumbar spine (left)      Negative slump.   Lumbar spine (right)     Negative slump.     Left Pelvic Girdle/Sacrum   Negative: sacral rotation, sacral thrust and thigh thrust.     Right Pelvic Girdle/Sacrum   Negative: sacral rotation, sacral thrust and thigh thrust.     Left Hip   Negative Gaenslen's, SI compression and SI distraction.   SLR: Negative.     Right Hip   Negative Gaenslen's, SI compression and SI distraction.   SLR: Negative.         Therapeutic Exercises (CPT 97632):       Therapeutic Exercise Summary: HEP instruction/performance and development. Handout provided and exercises located below:  Access Code: [email or web address]   URL: [email or web address]   Date: 05/30/2019   Prepared by: Bakari Otoole      Exercises  · Supine Gluteal Sets - 10 reps - 5 hold - 2x daily  · Supine Posterior Pelvic Tilt - 20 reps - 2 sets - 2x daily  · Supine Lumbar Rotation - 25 reps - 2x daily        Time-based treatments/modalities:  Therapeutic exercise minutes (CPT 48253): 15 minutes       Assessment, Response and Plan:   Assessment details:  Kaylee Nazario is a 73  y.o. female with signs and symptoms consistent with a gluteal strain secondary to impact. Deep bruising/bone bruising may be a contributor to her pain after the fall. She requires skilled physical therapy intervention to decrease pain, increase range of motion, increase functional mobility, improve ADL completion and establish a home exercise program.  Goals:   Short Term Goals:   1. Patient will be Independent with prescribed Home Exercise Program (HEP) and will be able to demonstrate exercises without cues for improved overall symptoms/activity tolerance.   2. Pt will improve hip extension strength to 3+/5 for improved ability to bear weight without pain.   Short term goal time span:  2-4 weeks      Long Term Goals:    3. Pt will improve ability to ambulated 5-10 minutes without increase in pain greater than 3/10  4. Pt will improve LEFS score to greater than 30/80 indicative of improved function and reduced perceived disability.  5. Pt will be able to get in and out of a chair without increased pain greater than 2/10.   Long term goal time span:  6-8 weeks    Plan:   Planned therapy interventions:  Therapeutic Exercise (CPT 56636), Therapeutic Activities (CPT 24856), Manual Therapy (CPT 52547), Neuromuscular Re-education (CPT 49679), E Stim Unattended (CPT 98202) and Gait Training (CPT 02790)  Frequency:  2x week  Duration in weeks:  6  Discussed with:  Patient      Functional Limitations and Severity Modifiers  PT Functional Assessment Tool Used: LEFS  PT Functional Assessment Score: 13/80     Referring provider co-signature:  I have reviewed this plan of care and my co-signature certifies the need for services.  Certification Dates:   From 05/30/19     To 7/25/18    Physician Signature: ________________________________ Date: ______________

## 2019-06-06 ENCOUNTER — PHYSICAL THERAPY (OUTPATIENT)
Dept: PHYSICAL THERAPY | Facility: REHABILITATION | Age: 74
End: 2019-06-06
Attending: INTERNAL MEDICINE
Payer: MEDICARE

## 2019-06-06 DIAGNOSIS — M79.18 BUTTOCK PAIN: ICD-10-CM

## 2019-06-06 PROCEDURE — 97110 THERAPEUTIC EXERCISES: CPT

## 2019-06-06 NOTE — OP THERAPY DAILY TREATMENT
Outpatient Physical Therapy  DAILY TREATMENT     [facility]  [address], [address]  [facility] [address]  Phone:  [phone]  Fax:  [phone]    Date: 06/06/2019    Patient: Kaylee Nazario  YOB: 1945  MRN: [id number]     Time Calculation  Start time: 0815  Stop time: 0845 Time Calculation (min): 30 minutes     Chief Complaint: left buttocks pain    Visit #: 2    SUBJECTIVE:  Less pain overall. Feels like she can do more with a little less pain. Getting up from chair is still painful/getting out of her car. Sitting on firm surfaces. She purchased a seat cushion and feels that it helps.     OBJECTIVE:  Current objective measures: hip extension 3+/5 with pain        Therapeutic Exercises (CPT 79089):     1. Supine ball rolls, 2min    2. DKTC, 1min hold    3. Shuttle, SL 4c 2x20    4. HEP as below modified      Therapeutic Exercise Summary: HEP instruction/performance and development. Handout provided and exercises located below:  Access Code: [email or web address]   URL: [email or web address]   Date: 06/06/2019   Prepared by: Bakari Otoole      Exercises  · Supine Posterior Pelvic Tilt - 20 reps - 2 sets - 2x daily  · Supine Lumbar Rotation - 25 reps - 2x daily  · Sit to Stand - 8 reps - 3x daily  · Beginner Prone Single Leg Raise - 10 reps - 2 sets - 1x daily            Time-based treatments/modalities:  Therapeutic exercise minutes (CPT 57780): 30 minutes       Pain rating before treatment: 5 with walking  Pain rating after treatment: 5 with walking    ASSESSMENT:   Response to treatment: Pt improving overall with decreased pain and improved ability to sit comfortably. glute strength and intensity of exercises to gradually increase without increase in pain.     PLAN/RECOMMENDATIONS:   Plan for treatment: therapy treatment to continue next visit.  Planned interventions for next visit: continue with current treatment.

## 2019-06-13 ENCOUNTER — APPOINTMENT (OUTPATIENT)
Dept: PHYSICAL THERAPY | Facility: REHABILITATION | Age: 74
End: 2019-06-13
Attending: INTERNAL MEDICINE
Payer: MEDICARE

## 2019-06-18 ENCOUNTER — APPOINTMENT (OUTPATIENT)
Dept: PHYSICAL THERAPY | Facility: REHABILITATION | Age: 74
End: 2019-06-18
Attending: INTERNAL MEDICINE
Payer: MEDICARE

## 2019-06-24 RX ORDER — AMLODIPINE BESYLATE 5 MG/1
TABLET ORAL
Qty: 100 TAB | Refills: 1 | Status: SHIPPED | OUTPATIENT
Start: 2019-06-24 | End: 2019-12-03 | Stop reason: SDUPTHER

## 2019-06-24 NOTE — TELEPHONE ENCOUNTER
Refill X 6 months, sent to pharmacy.Pt. Seen in the last 6 months per protocol.   Lab Results   Component Value Date/Time    SODIUM 140 12/07/2018 09:48 AM    POTASSIUM 4.0 12/07/2018 09:48 AM    CHLORIDE 107 12/07/2018 09:48 AM    CO2 28 12/07/2018 09:48 AM    GLUCOSE 97 12/07/2018 09:48 AM    BUN 17 12/07/2018 09:48 AM    CREATININE 0.77 12/07/2018 09:48 AM

## 2019-06-24 NOTE — TELEPHONE ENCOUNTER
*PER NEW INS PROTOCOL, NEEDS TO BE DONE FOR 100DAYS SUPPLY*  Was the patient seen in the last year in this department? Yes    Does patient have an active prescription for medications requested? No     Received Request Via: Pharmacy      Pt met protocol?: Yes  LAST OV 05/24/2019    BP Readings from Last 1 Encounters:   05/24/19 124/72     Lab Results   Component Value Date/Time    CHOLSTRLTOT 182 12/07/2018 09:48 AM     (H) 12/07/2018 09:48 AM    HDL 52 12/07/2018 09:48 AM    TRIGLYCERIDE 142 12/07/2018 09:48 AM       Lab Results   Component Value Date/Time    SODIUM 140 12/07/2018 09:48 AM    POTASSIUM 4.0 12/07/2018 09:48 AM    CHLORIDE 107 12/07/2018 09:48 AM    CO2 28 12/07/2018 09:48 AM    GLUCOSE 97 12/07/2018 09:48 AM    BUN 17 12/07/2018 09:48 AM    CREATININE 0.77 12/07/2018 09:48 AM     Lab Results   Component Value Date/Time    ALKPHOSPHAT 79 12/07/2018 09:48 AM    ASTSGOT 17 12/07/2018 09:48 AM    ALTSGPT 13 12/07/2018 09:48 AM    TBILIRUBIN 0.5 12/07/2018 09:48 AM

## 2019-06-27 ENCOUNTER — APPOINTMENT (OUTPATIENT)
Dept: PHYSICAL THERAPY | Facility: REHABILITATION | Age: 74
End: 2019-06-27
Attending: INTERNAL MEDICINE
Payer: MEDICARE

## 2019-07-02 ENCOUNTER — TELEPHONE (OUTPATIENT)
Dept: PHYSICAL THERAPY | Facility: REHABILITATION | Age: 74
End: 2019-07-02

## 2019-07-02 NOTE — OP THERAPY DISCHARGE SUMMARY
Outpatient Physical Therapy  DISCHARGE SUMMARY NOTE      [facility] [place]  [address], [address]  [place] [address]  Phone:  [phone]  Fax:  [phone]    Date of Visit: 07/02/2019    Patient: Kaylee Nazario  YOB: 1945  MRN: [id number]     Referring Provider: Jose C Caldwell M.D.   Referring Diagnosis Buttock pain [M79.18]     Physical Therapy Occurrence Codes    Date of onset of impairment:  3/30/19   Date physical therapy care plan established or reviewed:  5/30/19   Date physical therapy treatment started:  5/30/19        Your patient is being discharged from Physical Therapy with the following comments:   · Patient has been non-compliant with physical therapy plan of care    Comments:  Pt symptoms were consistent with gluteal tendonitis and pt was warned that exercises may increase pain temporarily and that no exercise should be exacerbatory during. She cancelled all remaining appointments despite having relief after the first visit. She has been discharged from therapy due to non-compliance.       Limitations Remaining:  See evaluation    Recommendations:  Continued therapy, f/u with physician    Bakari Otoole, PT, DPT    Date: 7/2/2019

## 2019-09-20 RX ORDER — LINACLOTIDE 145 UG/1
CAPSULE, GELATIN COATED ORAL
Qty: 90 CAP | Refills: 0 | Status: SHIPPED | OUTPATIENT
Start: 2019-09-20 | End: 2020-12-28

## 2019-10-09 RX ORDER — LEVOTHYROXINE SODIUM 0.1 MG/1
TABLET ORAL
Qty: 90 TAB | Refills: 0 | Status: SHIPPED | OUTPATIENT
Start: 2019-10-09 | End: 2019-12-03 | Stop reason: SDUPTHER

## 2019-10-09 NOTE — TELEPHONE ENCOUNTER
Was the patient seen in the last year in this department? Yes    Does patient have an active prescription for medications requested? No     Received Request Via: Pharmacy      Pt met protocol?: Yes, last ov 6/19  Last labs 12/18  TSH   Date Value Ref Range Status   12/07/2018 0.160 (L) 0.380 - 5.330 uIU/mL Final     Comment:     Please note new reference ranges effective 12/14/2017 10:00 AM  Pregnant Females, 1st Trimester  0.050-3.700  Pregnant Females, 2nd Trimester  0.310-4.350  Pregnant Females, 3rd Trimester  0.410-5.180

## 2019-12-03 ENCOUNTER — OFFICE VISIT (OUTPATIENT)
Dept: MEDICAL GROUP | Facility: PHYSICIAN GROUP | Age: 74
End: 2019-12-03
Payer: MEDICARE

## 2019-12-03 VITALS
WEIGHT: 170.4 LBS | SYSTOLIC BLOOD PRESSURE: 130 MMHG | RESPIRATION RATE: 14 BRPM | TEMPERATURE: 98 F | DIASTOLIC BLOOD PRESSURE: 80 MMHG | HEIGHT: 64 IN | BODY MASS INDEX: 29.09 KG/M2 | HEART RATE: 72 BPM | OXYGEN SATURATION: 94 %

## 2019-12-03 DIAGNOSIS — I10 ESSENTIAL HYPERTENSION: ICD-10-CM

## 2019-12-03 DIAGNOSIS — Z51.81 MEDICATION MONITORING ENCOUNTER: ICD-10-CM

## 2019-12-03 DIAGNOSIS — R53.83 OTHER FATIGUE: ICD-10-CM

## 2019-12-03 DIAGNOSIS — Z12.31 ENCOUNTER FOR SCREENING MAMMOGRAM FOR MALIGNANT NEOPLASM OF BREAST: ICD-10-CM

## 2019-12-03 DIAGNOSIS — E83.42 HYPOMAGNESEMIA: ICD-10-CM

## 2019-12-03 DIAGNOSIS — E78.5 DYSLIPIDEMIA: ICD-10-CM

## 2019-12-03 DIAGNOSIS — Z11.59 ENCOUNTER FOR HEPATITIS C SCREENING TEST FOR LOW RISK PATIENT: ICD-10-CM

## 2019-12-03 DIAGNOSIS — K59.00 CONSTIPATION, UNSPECIFIED CONSTIPATION TYPE: ICD-10-CM

## 2019-12-03 DIAGNOSIS — E53.8 VITAMIN B12 DEFICIENCY: ICD-10-CM

## 2019-12-03 DIAGNOSIS — C44.310 FACIAL BASAL CELL CANCER: ICD-10-CM

## 2019-12-03 DIAGNOSIS — R73.9 HYPERGLYCEMIA: ICD-10-CM

## 2019-12-03 DIAGNOSIS — E55.9 VITAMIN D DEFICIENCY: ICD-10-CM

## 2019-12-03 DIAGNOSIS — E03.9 HYPOTHYROIDISM, UNSPECIFIED TYPE: ICD-10-CM

## 2019-12-03 PROBLEM — K13.79 MOUTH SORES: Status: RESOLVED | Noted: 2017-11-06 | Resolved: 2019-12-03

## 2019-12-03 PROCEDURE — 99214 OFFICE O/P EST MOD 30 MIN: CPT | Performed by: FAMILY MEDICINE

## 2019-12-03 RX ORDER — LEVOTHYROXINE SODIUM 0.1 MG/1
TABLET ORAL
Qty: 100 TAB | Refills: 0 | Status: SHIPPED | OUTPATIENT
Start: 2019-12-03 | End: 2020-05-04 | Stop reason: SDUPTHER

## 2019-12-03 RX ORDER — ATORVASTATIN CALCIUM 10 MG/1
10 TABLET, FILM COATED ORAL DAILY
Qty: 100 TAB | Refills: 0 | Status: SHIPPED | OUTPATIENT
Start: 2019-12-03 | End: 2020-06-23

## 2019-12-03 RX ORDER — AMLODIPINE BESYLATE 5 MG/1
TABLET ORAL
Qty: 100 TAB | Refills: 0 | Status: SHIPPED | OUTPATIENT
Start: 2019-12-03 | End: 2020-05-04

## 2019-12-03 RX ORDER — INFLUENZA A VIRUS A/MICHIGAN/45/2015 X-275 (H1N1) ANTIGEN (FORMALDEHYDE INACTIVATED), INFLUENZA A VIRUS A/SINGAPORE/INFIMH-16-0019/2016 IVR-186 (H3N2) ANTIGEN (FORMALDEHYDE INACTIVATED), AND INFLUENZA B VIRUS B/MARYLAND/15/2016 BX-69A (A B/COLORADO/6/2017-LIKE VIRUS) ANTIGEN (FORMALDEHYDE INACTIVATED) 60; 60; 60 UG/.5ML; UG/.5ML; UG/.5ML
INJECTION, SUSPENSION INTRAMUSCULAR
Refills: 0 | COMMUNITY
Start: 2019-09-18 | End: 2019-12-03

## 2019-12-03 RX ORDER — TIMOLOL MALEATE 5 MG/ML
SOLUTION/ DROPS OPHTHALMIC
Refills: 4 | COMMUNITY
Start: 2019-10-26 | End: 2021-09-20

## 2019-12-03 RX ORDER — ERYTHROMYCIN 5 MG/G
OINTMENT OPHTHALMIC
COMMUNITY
Start: 2019-11-27 | End: 2019-12-26

## 2019-12-03 ASSESSMENT — PATIENT HEALTH QUESTIONNAIRE - PHQ9: CLINICAL INTERPRETATION OF PHQ2 SCORE: 0

## 2019-12-03 NOTE — PROGRESS NOTES
cc: Establish care, hypertension, breast cancer screening, BMI 29, constipation, history of basal cell skin cancer    Subjective:     Kaylee Nazario is a 73 y.o. female presenting Works as a psychiatric nurse.  Lives with her .  No social or domestic concerns.  No hearing aids not using any walking assistance or having any falls.  No major memory concerns.  No recent hospitalizations or surgeries.  She does have history of basal cell skin cancer and used to see a dermatologist but has not recently so she will go back for that.  For her blood pressure she is currently taking 1 blood pressure medication amlodipine 5 mg daily.  She does exercise and is not getting any chest pain or problems breathing.  She does have chronic right knee pain status post surgery and they did recommend a revision through orthopedic but she does not want to do any surgery or trial of joint injection at this time.  She has not had lab work in over a year.  Only for hypothyroidism she is taking 100 mcg of levothyroxine.    Review of systems:     Constitutional: Negative for fever, chills and positive fatigue.   HENT: Negative for sinus pressure, negative for ear pain or hearing loss  Eyes: Negative for blurriness, negative for double vision  Respiratory: Negative for cough and shortness of breath, negative for exertional shortness of breath  Cardiovascular: Negative for leg swelling, negative for palpitations, negative for chest pain  Gastrointestinal: Negative for nausea, vomiting, abdominal pain, positive constipation and negative diarrhea.  Genitourinary: Negative for dysuria and hematuria.   Skin: Negative for rash.   Neurological: Negative for dizziness, focal weakness and headaches.   Endo/Heme/Allergies: Denies bleeding, bruising, and recurrent allergies.  Psychiatric/Behavioral: Negative for depression and anxiety.        Current Outpatient Medications:   •  erythromycin 5 MG/GM Ointment, , Disp: , Rfl:   •  timolol  "(TIMOPTIC) 0.5 % Solution, PLACE 1 DROP INTO EACH EYE ONCE DAILY IN THE MORNING, Disp: , Rfl: 4  •  SPIRULINA PO, Take  by mouth. 6 tabs daily, Disp: , Rfl:   •  coenzyme Q-10 30 MG capsule, Take 60 mg by mouth every day., Disp: , Rfl:   •  atorvastatin (LIPITOR) 10 MG Tab, Take 1 Tab by mouth every day., Disp: 100 Tab, Rfl: 0  •  amLODIPine (NORVASC) 5 MG Tab, TAKE 1 TABLET BY MOUTH ONCE DAILY, Disp: 100 Tab, Rfl: 0  •  levothyroxine (SYNTHROID) 100 MCG Tab, TAKE 1 TABLET BY MOUTH ONCE DAILY IN THE MORNING ON AN EMPTY STOMACH, Disp: 100 Tab, Rfl: 0  •  LINZESS 145 MCG Cap, TAKE 1 CAPSULE BY MOUTH ONCE DAILY AS NEEDED, Disp: 90 Cap, Rfl: 0  •  lidocaine viscous 2% (XYLOCAINE) 2 % Solution, 10 mL swish and spit every 6 hours as needed for mouth pain, Disp: 100 mL, Rfl: 1  •  Cholecalciferol (VITAMIN D3) 5000 units Cap, Take 1 Cap by mouth every day., Disp: , Rfl:   •  latanoprost (XALATAN) 0.005 % Solution, Place 1 Drop in both eyes every evening., Disp: , Rfl:   •  aspirin 81 MG tablet, Take 81 mg by mouth every day., Disp: , Rfl:   •  vitamin e (VITAMIN E) 400 UNIT CAPS, Take 400 Units by mouth every day.  , Disp: , Rfl:   •  Omega-3 Fatty Acids (FISH OIL) 1000 MG CPDR, Take 1,200 mg by mouth every day., Disp: , Rfl:     Allergies, past medical history, past surgical history, family history, social history reviewed and updated    Objective:     Vitals: /80 (BP Location: Right arm, Patient Position: Sitting, BP Cuff Size: Adult)   Pulse 72   Temp 36.7 °C (98 °F) (Temporal)   Resp 14   Ht 1.619 m (5' 3.75\")   Wt 77.3 kg (170 lb 6.4 oz)   SpO2 94%   BMI 29.48 kg/m²   General: Alert, pleasant, NAD  HEENT: Normocephalic.  Nontraumatic. EOMI, no icterus or pallor.  Conjunctivae and lids normal. External ears normal. Oropharynx non-erythematous, mucous membranes moist.  Neck supple.  No thyromegaly or masses palpated. No cervical or supraclavicular lymphadenopathy.  Heart: Regular rate and rhythm.  S1 and S2 " normal.  No murmurs appreciated.  Respiratory: Normal respiratory effort.  Clear to auscultation bilaterally.  Abdomen: Non-distended, soft, non tender in all 4 quadrants.  Skin: Warm, dry, no rashes.  Musculoskeletal: Gait is normal.  Moves all extremities well.  Extremities: No leg edema.  Pedal pulses 2+ symmetric.   Psych:  Affect/mood is normal, judgement is good, memory is intact, grooming is appropriate.    Assessment/Plan:     Diagnoses and all orders for this visit:    BMI 29.0-29.9,adult    Encounter for screening mammogram for malignant neoplasm of breast  -     MA-SCREENING MAMMO BILAT W/TOMOSYNTHESIS W/CAD; Future    Constipation, unspecified constipation type    Essential hypertension  -     Comp Metabolic Panel; Future  -     amLODIPine (NORVASC) 5 MG Tab; TAKE 1 TABLET BY MOUTH ONCE DAILY    Facial basal cell cancer    Hypothyroidism, unspecified type  -     levothyroxine (SYNTHROID) 100 MCG Tab; TAKE 1 TABLET BY MOUTH ONCE DAILY IN THE MORNING ON AN EMPTY STOMACH    Other fatigue  -     CBC WITH DIFFERENTIAL; Future  -     TSH WITH REFLEX TO FT4; Future    Medication monitoring encounter  -     CBC WITH DIFFERENTIAL; Future  -     Comp Metabolic Panel; Future    Encounter for hepatitis C screening test for low risk patient  -     HEPATITIS PANEL ACUTE(4 COMPONENTS); Future    Hypomagnesemia  -     MAGNESIUM; Future    Vitamin B12 deficiency  -     VITAMIN B12; Future    Vitamin D deficiency  -     VITAMIN D,25 HYDROXY; Future    Hyperglycemia  -     HEMOGLOBIN A1C; Future    Dyslipidemia  -     Lipid Profile; Future  -     atorvastatin (LIPITOR) 10 MG Tab; Take 1 Tab by mouth every day.    -At least 1 week before you see me for your follow-up in 6 weeks please get fasting lab work 8 hours of no eating but drink plenty of water and please call and get your mammogram and breast cancer screening done.  Continue to see the ophthalmologist annually and please continue to also see your dermatologist.   History of skin cancer.  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  Also please check your blood pressure your top number and your bottom number systolic and diastolic and heart rate about twice a week sometimes in the morning sometimes in the evening please bring this blood pressure log as we would like the goal to be below 130 over 90s.  Also for your chronic constipation please try and increase your MiraLAX and Metamucil and or over-the-counter magnesium and please drink 6 to 8 glasses of water especially when you are working keep a bottle with you with warm water and you could put some lemon or lime or fruit in there if you do not seem to like water.  But this is essential for constipation.  ER precautions given if any chest pain, shortness of breath, passing out then please go to the ER call 911 otherwise we will see you back here in 6 weeks to go over your lab work and see how your constipation is working.  And make any medication adjustments.    Return in about 6 weeks (around 1/14/2020), or FU labs/constipation.

## 2019-12-03 NOTE — PATIENT INSTRUCTIONS
Diagnoses and all orders for this visit:    BMI 29.0-29.9,adult    Encounter for screening mammogram for malignant neoplasm of breast  -     MA-SCREENING MAMMO BILAT W/TOMOSYNTHESIS W/CAD; Future    Constipation, unspecified constipation type    Essential hypertension  -     Comp Metabolic Panel; Future  -     amLODIPine (NORVASC) 5 MG Tab; TAKE 1 TABLET BY MOUTH ONCE DAILY    Facial basal cell cancer    Hypothyroidism, unspecified type  -     levothyroxine (SYNTHROID) 100 MCG Tab; TAKE 1 TABLET BY MOUTH ONCE DAILY IN THE MORNING ON AN EMPTY STOMACH    Other fatigue  -     CBC WITH DIFFERENTIAL; Future  -     TSH WITH REFLEX TO FT4; Future    Medication monitoring encounter  -     CBC WITH DIFFERENTIAL; Future  -     Comp Metabolic Panel; Future    Encounter for hepatitis C screening test for low risk patient  -     HEPATITIS PANEL ACUTE(4 COMPONENTS); Future    Hypomagnesemia  -     MAGNESIUM; Future    Vitamin B12 deficiency  -     VITAMIN B12; Future    Vitamin D deficiency  -     VITAMIN D,25 HYDROXY; Future    Hyperglycemia  -     HEMOGLOBIN A1C; Future    Dyslipidemia  -     Lipid Profile; Future  -     atorvastatin (LIPITOR) 10 MG Tab; Take 1 Tab by mouth every day.    -At least 1 week before you see me for your follow-up in 6 weeks please get fasting lab work 8 hours of no eating but drink plenty of water and please call and get your mammogram and breast cancer screening done.  Continue to see the ophthalmologist annually and please continue to also see your dermatologist.  History of skin cancer.  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  Also please check your blood  pressure your top number and your bottom number systolic and diastolic and heart rate about twice a week sometimes in the morning sometimes in the evening please bring this blood pressure log as we would like the goal to be below 130 over 90s.  Also for your chronic constipation please try and increase your MiraLAX and Metamucil and or over-the-counter magnesium and please drink 6 to 8 glasses of water especially when you are working keep a bottle with you with warm water and you could put some lemon or lime or fruit in there if you do not seem to like water.  But this is essential for constipation.  ER precautions given if any chest pain, shortness of breath, passing out then please go to the ER call 911 otherwise we will see you back here in 6 weeks to go over your lab work and see how your constipation is working.  And make any medication adjustments.    Return in about 6 weeks (around 1/14/2020), or FU labs/constipation.      Constipation, Adult  Constipation is when a person has fewer bowel movements in a week than normal, has difficulty having a bowel movement, or has stools that are dry, hard, or larger than normal. Constipation may be caused by an underlying condition. It may become worse with age if a person takes certain medicines and does not take in enough fluids.  Follow these instructions at home:  Eating and drinking  · Eat foods that have a lot of fiber, such as fresh fruits and vegetables, whole grains, and beans.  · Limit foods that are high in fat, low in fiber, or overly processed, such as french fries, hamburgers, cookies, candies, and soda.  · Drink enough fluid to keep your urine clear or pale yellow.  General instructions  · Exercise regularly or as told by your health care provider.  · Go to the restroom when you have the urge to go. Do not hold it in.  · Take over-the-counter and prescription medicines only as told by your health care provider. These include any fiber  supplements.  · Practice pelvic floor retraining exercises, such as deep breathing while relaxing the lower abdomen and pelvic floor relaxation during bowel movements.  · Watch your condition for any changes.  · Keep all follow-up visits as told by your health care provider. This is important.  Contact a health care provider if:  · You have pain that gets worse.  · You have a fever.  · You do not have a bowel movement after 4 days.  · You vomit.  · You are not hungry.  · You lose weight.  · You are bleeding from the anus.  · You have thin, pencil-like stools.  Get help right away if:  · You have a fever and your symptoms suddenly get worse.  · You leak stool or have blood in your stool.  · Your abdomen is bloated.  · You have severe pain in your abdomen.  · You feel dizzy or you faint.  This information is not intended to replace advice given to you by your health care provider. Make sure you discuss any questions you have with your health care provider.  Document Released: 09/15/2005 Document Revised: 07/07/2017 Document Reviewed: 06/07/2017  [employer] Interactive Patient Education © 2017 [employer]  Knee Exercises  Ask your health care provider which exercises are safe for you. Do exercises exactly as told by your health care provider and adjust them as directed. It is normal to feel mild stretching, pulling, tightness, or discomfort as you do these exercises, but you should stop right away if you feel sudden pain or your pain gets worse. Do not begin these exercises until told by your health care provider.  STRETCHING AND RANGE OF MOTION EXERCISES   These exercises warm up your muscles and joints and improve the movement and flexibility of your knee. These exercises also help to relieve pain, numbness, and tingling.  Exercise A: Knee Extension, Prone  1. Lie on your abdomen on a bed.  2. Place your left / right knee just beyond the edge of the surface so your knee is not on the bed. You can put a towel under  your left / right thigh just above your knee for comfort.  3. Relax your leg muscles and allow gravity to straighten your knee. You should feel a stretch behind your left / right knee.  4. Hold this position for __________ seconds.  5. Scoot up so your knee is supported between repetitions.  Repeat __________ times. Complete this stretch __________ times a day.  Exercise B: Knee Flexion, Active   1. Lie on your back with both knees straight. If this causes back discomfort, bend your left / right knee so your foot is flat on the floor.  2. Slowly slide your left / right heel back toward your buttocks until you feel a gentle stretch in the front of your knee or thigh.  3. Hold this position for __________ seconds.  4. Slowly slide your left / right heel back to the starting position.  Repeat __________ times. Complete this exercise __________ times a day.  Exercise C: Quadriceps, Prone   1. Lie on your abdomen on a firm surface, such as a bed or padded floor.  2. Bend your left / right knee and hold your ankle. If you cannot reach your ankle or pant leg, loop a belt around your foot and grab the belt instead.  3. Gently pull your heel toward your buttocks. Your knee should not slide out to the side. You should feel a stretch in the front of your thigh and knee.  4. Hold this position for __________ seconds.  Repeat __________ times. Complete this stretch __________ times a day.  Exercise D: Hamstring, Supine  1. Lie on your back.  2. Loop a belt or towel over the ball of your left / right foot. The ball of your foot is on the walking surface, right under your toes.  3. Straighten your left / right knee and slowly pull on the belt to raise your leg until you feel a gentle stretch behind your knee.  ¨ Do not let your left / right knee bend while you do this.  ¨ Keep your other leg flat on the floor.  4. Hold this position for __________ seconds.  Repeat __________ times. Complete this stretch __________ times a  day.  STRENGTHENING EXERCISES   These exercises build strength and endurance in your knee. Endurance is the ability to use your muscles for a long time, even after they get tired.  Exercise E: Quadriceps, Isometric   1. Lie on your back with your left / right leg extended and your other knee bent. Put a rolled towel or small pillow under your knee if told by your health care provider.  2. Slowly tense the muscles in the front of your left / right thigh. You should see your kneecap slide up toward your hip or see increased dimpling just above the knee. This motion will push the back of the knee toward the floor.  3. For __________ seconds, keep the muscle as tight as you can without increasing your pain.  4. Relax the muscles slowly and completely.  Repeat __________ times. Complete this exercise __________ times a day.  Exercise F: Straight Leg Raises - Quadriceps  1. Lie on your back with your left / right leg extended and your other knee bent.  2. Tense the muscles in the front of your left / right thigh. You should see your kneecap slide up or see increased dimpling just above the knee. Your thigh may even shake a bit.  3. Keep these muscles tight as you raise your leg 4-6 inches (10-15 cm) off the floor. Do not let your knee bend.  4. Hold this position for __________ seconds.  5. Keep these muscles tense as you lower your leg.  6. Relax your muscles slowly and completely after each repetition.  Repeat __________ times. Complete this exercise __________ times a day.  Exercise G: Hamstring, Isometric  1. Lie on your back on a firm surface.  2. Bend your left / right knee approximately __________ degrees.  3. Dig your left / right heel into the surface as if you are trying to pull it toward your buttocks. Tighten the muscles in the back of your thighs to dig as hard as you can without increasing any pain.  4. Hold this position for __________ seconds.  5. Release the tension gradually and allow your muscles to  relax completely for __________ seconds after each repetition.  Repeat __________ times. Complete this exercise __________ times a day.  Exercise H: Hamstring Curls   If told by your health care provider, do this exercise while wearing ankle weights. Begin with __________ weights. Then increase the weight by 1 lb (0.5 kg) increments. Do not wear ankle weights that are more than __________.  1. Lie on your abdomen with your legs straight.  2. Bend your left / right knee as far as you can without feeling pain. Keep your hips flat against the floor.  3. Hold this position for __________ seconds.  4. Slowly lower your leg to the starting position.  Repeat __________ times. Complete this exercise __________ times a day.  Exercise I: Squats (Quadriceps)  1.  front of a table, with your feet and knees pointing straight ahead. You may rest your hands on the table for balance but not for support.  2. Slowly bend your knees and lower your hips like you are going to sit in a chair.  ¨ Keep your weight over your heels, not over your toes.  ¨ Keep your lower legs upright so they are parallel with the table legs.  ¨ Do not let your hips go lower than your knees.  ¨ Do not bend lower than told by your health care provider.  ¨ If your knee pain increases, do not bend as low.  3. Hold the squat position for __________ seconds.  4. Slowly push with your legs to return to standing. Do not use your hands to pull yourself to standing.  Repeat __________ times. Complete this exercise __________ times a day.  Exercise J: Wall Slides (Quadriceps)   1. Lean your back against a smooth wall or door while you walk your feet out 18-24 inches (46-61 cm) from it.  2. Place your feet hip-width apart.  3. Slowly slide down the wall or door until your knees bend __________ degrees. Keep your knees over your heels, not over your toes. Keep your knees in line with your hips.  4. Hold for __________ seconds.  Repeat __________ times. Complete  this exercise __________ times a day.  Exercise K: Straight Leg Raises - Hip Abductors  1. Lie on your side with your left / right leg in the top position. Lie so your head, shoulder, knee, and hip line up. You may bend your bottom knee to help you keep your balance.  2. Roll your hips slightly forward so your hips are stacked directly over each other and your left / right knee is facing forward.  3. Leading with your heel, lift your top leg 4-6 inches (10-15 cm). You should feel the muscles in your outer hip lifting.  ¨ Do not let your foot drift forward.  ¨ Do not let your knee roll toward the ceiling.  4. Hold this position for __________ seconds.  5. Slowly return your leg to the starting position.  6. Let your muscles relax completely after each repetition.  Repeat __________ times. Complete this exercise __________ times a day.  Exercise L: Straight Leg Raises - Hip Extensors  1. Lie on your abdomen on a firm surface. You can put a pillow under your hips if that is more comfortable.  2. Tense the muscles in your buttocks and lift your left / right leg about 4-6 inches (10-15 cm). Keep your knee straight as you lift your leg.  3. Hold this position for __________ seconds.  4. Slowly lower your leg to the starting position.  5. Let your leg relax completely after each repetition.  Repeat __________ times. Complete this exercise __________ times a day.  This information is not intended to replace advice given to you by your health care provider. Make sure you discuss any questions you have with your health care provider.  Document Released: 11/01/2006 Document Revised: 09/11/2017 Document Reviewed: 10/23/2016  [employer] Interactive Patient Education © 2017 [employer]

## 2019-12-05 ENCOUNTER — PATIENT OUTREACH (OUTPATIENT)
Dept: HEALTH INFORMATION MANAGEMENT | Facility: OTHER | Age: 74
End: 2019-12-05

## 2019-12-06 NOTE — PROGRESS NOTES
Called pt to wish her a happy birthday. 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 I did ask if there was anything I could help her with today. She wanted to know if her plan included a gym benefit since Dr. Nichole recommended that she get back into water aerobics. I let her know that her plan does not cover the gym membership. She stated that she used to do water aerobics at [facility] and really liked it there but it got too expensive. I let her know I can call over and see if they offer any discounts. I will call her back once I speak with them. Pt had no other questions at this time.

## 2019-12-09 ENCOUNTER — HOSPITAL ENCOUNTER (OUTPATIENT)
Dept: RADIOLOGY | Facility: MEDICAL CENTER | Age: 74
End: 2019-12-09
Attending: FAMILY MEDICINE
Payer: MEDICARE

## 2019-12-09 DIAGNOSIS — Z12.31 ENCOUNTER FOR SCREENING MAMMOGRAM FOR MALIGNANT NEOPLASM OF BREAST: ICD-10-CM

## 2019-12-09 PROCEDURE — 77063 BREAST TOMOSYNTHESIS BI: CPT

## 2019-12-12 ENCOUNTER — PATIENT MESSAGE (OUTPATIENT)
Dept: MEDICAL GROUP | Facility: PHYSICIAN GROUP | Age: 74
End: 2019-12-12

## 2019-12-12 NOTE — PATIENT COMMUNICATION
Please call and let the patient know that if the blood pressure and or the blood sugar is uncontrolled then please make a sooner follow-up appointment to come and see me and bring in your blood sugar and blood pressure logs.  If the blood sugars are uncontrolled then we may need to consider endocrinology referral.,  I cannot see his chart on here but please make a sooner follow-up and bring in all your logs of the blood pressure and blood sugar and we can see what adjustments to make and for possible endocrinology referral to have this more under control.  If you are not feeling well and there is something more urgent and you cannot get a follow-up appointment then please go see urgent care otherwise I will see you at your follow-up appointment and please bring all bottles to this.  Also at your follow-up appointment please bring all your medication bottles and how you are taking your medications currently at this follow-up appointment for both of you.  Thanks.  Have a happy holidays.

## 2019-12-12 NOTE — PATIENT COMMUNICATION
Phone Number Called: Kaylee    Call outcome: spoke to patient regarding message below    Message: Informed pt of providers note. Pt stated that herself and her  will be going back to her old pill regimen. She stated that after taking on Dr. Nichole's medical advice, her husbands blood sugars have odalis rocketed and he also hasnt been sleeping.     Informed pt that Dr. Nichole will be leaving, advised pt to keep upcoming appointment, but to establish with new PCP due to the medication issues and BP/BS concerns.

## 2019-12-21 ENCOUNTER — OFFICE VISIT (OUTPATIENT)
Dept: URGENT CARE | Facility: PHYSICIAN GROUP | Age: 74
End: 2019-12-21
Payer: MEDICARE

## 2019-12-21 VITALS
SYSTOLIC BLOOD PRESSURE: 150 MMHG | BODY MASS INDEX: 29.02 KG/M2 | OXYGEN SATURATION: 95 % | HEART RATE: 79 BPM | TEMPERATURE: 98.2 F | WEIGHT: 174.2 LBS | RESPIRATION RATE: 18 BRPM | HEIGHT: 65 IN | DIASTOLIC BLOOD PRESSURE: 88 MMHG

## 2019-12-21 DIAGNOSIS — R03.0 ELEVATED BLOOD PRESSURE READING: ICD-10-CM

## 2019-12-21 PROCEDURE — 99213 OFFICE O/P EST LOW 20 MIN: CPT | Performed by: PHYSICIAN ASSISTANT

## 2019-12-21 ASSESSMENT — ENCOUNTER SYMPTOMS
WEAKNESS: 0
DIAPHORESIS: 0
SENSORY CHANGE: 0
VOMITING: 0
DIZZINESS: 0
HYPERTENSION: 1
BRUISES/BLEEDS EASILY: 0
HEADACHES: 0
EYE PAIN: 0
PALPITATIONS: 0
BLURRED VISION: 0
COUGH: 0
CHILLS: 0
MYALGIAS: 0
TINGLING: 0
FEVER: 0
CONSTIPATION: 0
BACK PAIN: 0
NAUSEA: 0
FOCAL WEAKNESS: 0
DIARRHEA: 0
SHORTNESS OF BREATH: 0
ABDOMINAL PAIN: 0

## 2019-12-21 NOTE — PROGRESS NOTES
Subjective:      Kaylee Nazario is a 74 y.o. female who presents with Hypertension (189/118 this morning, )      HPI:  This is a new problem. Kaylee Nazario is a 74 y.o. female who presents today for evaluation of elevated blood pressure.  Patient states that she had a new blood pressure cuff that goes on her wrist recently.  This morning when she took her blood pressure it was elevated to 200 something over 100 something.  She said she had very nervous so went over to her local [facility] and took her blood pressure again which stated that it was 189/118.  She was still concerned so came here for evaluation.  She does have a history of high blood pressure for which she takes Norvasc, but there have been no recent changes to her dosage.  She is not having any symptoms.  No chest pain, shortness of breath, lightheadedness/dizziness, diaphoresis, headaches, visual changes, or abdominal pain.  Patient is scheduled to follow-up with her PCPs office next week.      Review of Systems   Constitutional: Negative for chills, diaphoresis, fever and malaise/fatigue.   HENT: Negative for congestion and tinnitus.    Eyes: Negative for blurred vision and pain.   Respiratory: Negative for cough and shortness of breath.    Cardiovascular: Negative for chest pain, palpitations and leg swelling.   Gastrointestinal: Negative for abdominal pain, constipation, diarrhea, nausea and vomiting.   Musculoskeletal: Negative for back pain and myalgias.   Neurological: Negative for dizziness, tingling, sensory change, focal weakness, weakness and headaches.   Endo/Heme/Allergies: Does not bruise/bleed easily.       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 She also has no past medical history of Anxiety, Arrhythmia, Asthma, Blood transfusion without reported diagnosis, CHF (congestive heart failure) ([facility]), Clotting disorder ([facility]), COPD (chronic obstructive pulmonary disease) ([facility]), Depression, Diabetes ([facility]), GERD (gastroesophageal reflux disease), Heart attack ([facility]), Kidney disease, Migraine, Muscle disorder, Osteoporosis, Seizure ([facility]), Stroke ([facility]), Substance abuse ([facility]), or Urinary tract infection.  MEDS:   Current Outpatient Medications:   •  timolol (TIMOPTIC) 0.5 % Solution, PLACE 1 DROP INTO EACH EYE ONCE DAILY IN THE MORNING, Disp: , Rfl: 4  •  SPIRULINA PO, Take  by mouth. 6 tabs daily, Disp: , Rfl:   •  coenzyme Q-10 30 MG capsule, Take 60 mg by mouth every day., Disp: , Rfl:   •  atorvastatin (LIPITOR) 10 MG Tab, Take 1 Tab by mouth every day., Disp: 100 Tab, Rfl: 0  •  amLODIPine (NORVASC) 5 MG Tab, TAKE 1 TABLET BY MOUTH ONCE DAILY, Disp: 100 Tab, Rfl: 0  •  levothyroxine (SYNTHROID) 100 MCG Tab, TAKE 1 TABLET BY MOUTH ONCE DAILY IN THE MORNING ON AN EMPTY STOMACH, Disp: 100 Tab, Rfl: 0  •  LINZESS 145 MCG Cap, TAKE 1 CAPSULE BY MOUTH ONCE DAILY AS NEEDED, Disp: 90 Cap, Rfl: 0  •  lidocaine viscous 2% (XYLOCAINE) 2 % Solution, 10 mL swish and spit every 6 hours as needed for mouth pain, Disp: 100 mL, Rfl: 1  •  Cholecalciferol (VITAMIN D3) 5000 units Cap, Take 1 Cap by mouth every day., Disp: , Rfl:   •  latanoprost (XALATAN) 0.005 % Solution, Place 1 Drop in both eyes every evening., Disp: , Rfl:   •  aspirin 81 MG tablet, Take 81 mg by mouth every day., Disp: , Rfl:   •  vitamin e (VITAMIN E) 400 UNIT CAPS, Take 400 Units by mouth every day.  , Disp: , Rfl:   •  Omega-3 Fatty Acids (FISH OIL) 1000 MG CPDR, Take 1,200 mg by mouth every day., Disp: , Rfl:   •  erythromycin 5 MG/GM Ointment, , Disp: , Rfl:   ALLERGIES:   Allergies   Allergen Reactions   • Sulfa Drugs Anaphylaxis     SURGHX:   Past  "Surgical History:   Procedure Laterality Date   • KNEE ARTHROSCOPY Right 6/8/2016    Procedure: KNEE ARTHROSCOPY, MEDIAL MENISECTOMY, DEBRIEDMENT OF LATERAL  FEMORAL CONDYLE;  Surgeon: Real Carias M.D.;  Location: [facility];  Service:    • LATERAL RELEASE Right 6/8/2016    Procedure: LATERAL RELEASE;  Surgeon: Real Carias M.D.;  Location: [facility];  Service:    • TRIGGER FINGER RELEASE Bilateral 4/19/2016    Procedure: TRIGGER FINGER RELEASE-RING;  Surgeon: Brett Maddox M.D.;  Location: [facility];  Service:    • TRIGGER FINGER RELEASE  12/26/2012    Performed by Brett Maddox M.D. at [facility]   • COLONOSCOPY  8/2011   • CATARACT EXTRACTION WITH IOL Left 3/2009    left   • TUBAL LIGATION  1980's   • BREAST BIOPSY      multiple breast biopsies   • TONSILLECTOMY  as child     SOCHX:  reports that she has never smoked. She has never used smokeless tobacco. She reports that she does not drink alcohol or use drugs.  FH: Family history was reviewed, no pertinent findings to report     Objective:     /88 (BP Location: Right arm, Patient Position: Sitting, BP Cuff Size: Adult)   Pulse 79   Temp 36.8 °C (98.2 °F) (Temporal)   Resp 18   Ht 1.651 m (5' 5\")   Wt 79 kg (174 lb 3.2 oz)   SpO2 95%   BMI 28.99 kg/m²      Physical Exam  Constitutional:       Appearance: She is well-developed.   HENT:      Head: Normocephalic and atraumatic.      Right Ear: External ear normal.      Left Ear: External ear normal.   Eyes:      Conjunctiva/sclera: Conjunctivae normal.      Pupils: Pupils are equal, round, and reactive to light.   Neck:      Musculoskeletal: Normal range of motion.   Cardiovascular:      Rate and Rhythm: Normal rate and regular rhythm.      Heart sounds: Normal heart sounds. No murmur.   Pulmonary:      Effort: Pulmonary effort is normal.      Breath sounds: Normal breath sounds. No wheezing.   Lymphadenopathy:      Cervical: No " cervical adenopathy.   Skin:     General: Skin is warm and dry.      Capillary Refill: Capillary refill takes less than 2 seconds.   Neurological:      Mental Status: She is alert and oriented to person, place, and time.   Psychiatric:         Behavior: Behavior normal.         Judgment: Judgment normal.            Assessment/Plan:       1. Elevated blood pressure reading  -Blood pressure is only mildly elevated in the office.  No need for intervention as patient is asymptomatic.  Discussed with her that the wrist cuffs can often be inaccurate.  ER precautions discussed.  Follow-up with PCP on 12/26/2019            Differential Diagnosis, natural history, and supportive care discussed. Return to the Urgent Care or follow up with your PCP if symptoms fail to resolve, or for any new or worsening symptoms. Emergency room precautions discussed. Patient and/or family appears understanding of information.

## 2019-12-26 ENCOUNTER — OFFICE VISIT (OUTPATIENT)
Dept: MEDICAL GROUP | Facility: PHYSICIAN GROUP | Age: 74
End: 2019-12-26
Payer: MEDICARE

## 2019-12-26 VITALS
HEIGHT: 65 IN | BODY MASS INDEX: 28.82 KG/M2 | OXYGEN SATURATION: 97 % | TEMPERATURE: 98.2 F | DIASTOLIC BLOOD PRESSURE: 74 MMHG | SYSTOLIC BLOOD PRESSURE: 130 MMHG | WEIGHT: 173 LBS | HEART RATE: 70 BPM

## 2019-12-26 DIAGNOSIS — I10 ESSENTIAL HYPERTENSION: ICD-10-CM

## 2019-12-26 DIAGNOSIS — R73.03 PREDIABETES: ICD-10-CM

## 2019-12-26 DIAGNOSIS — E03.9 HYPOTHYROIDISM, UNSPECIFIED TYPE: ICD-10-CM

## 2019-12-26 DIAGNOSIS — E78.5 DYSLIPIDEMIA: ICD-10-CM

## 2019-12-26 DIAGNOSIS — Z13.1 DIABETES MELLITUS SCREENING: ICD-10-CM

## 2019-12-26 PROBLEM — M79.18 BUTTOCK PAIN: Status: RESOLVED | Noted: 2019-05-24 | Resolved: 2019-12-26

## 2019-12-26 PROBLEM — K59.00 CONSTIPATION: Status: RESOLVED | Noted: 2017-11-06 | Resolved: 2019-12-26

## 2019-12-26 PROCEDURE — 99214 OFFICE O/P EST MOD 30 MIN: CPT | Performed by: INTERNAL MEDICINE

## 2019-12-26 NOTE — PROGRESS NOTES
New Patient to establish    No chief complaint on file.      Subjective:     History of Present Illness: Patient is a 74 y.o. female who is here today to establish primary care    1. Hypothyroidism, unspecified type  She has this diagnosis for years, on Synthroid 100 mcg daily, last TSH in December 2018 was low, free T4 normal range.  -Patient denied having hypothyroidism symptoms and compliant with medication.    2. Essential hypertension  Chronic, stable, on amlodipine 5 mg daily, denies having hypertension symptoms.    3. Dyslipidemia  Chronic, stable, last lipid panel in December 2018 was borderline normal.  She is on Lipitor 10 mg daily.          History of meniscal tear of right knee  DATE OF SERVICE:  06/08/2016   PREOPERATIVE DIAGNOSIS:  Internal derangement, right knee.   POSTOPERATIVE DIAGNOSIS:  Medial meniscus tear with severe patellofemoral arthritis.     PROCEDURES PERFORMED:  Arthroscopy, right knee; partial medial meniscectomy, lateral release, parapatellar band.  SURGEON:  Real Carias MD      Current Outpatient Medications on File Prior to Visit   Medication Sig Dispense Refill   • timolol (TIMOPTIC) 0.5 % Solution PLACE 1 DROP INTO EACH EYE ONCE DAILY IN THE MORNING  4   • SPIRULINA PO Take  by mouth. 6 tabs daily     • coenzyme Q-10 30 MG capsule Take 60 mg by mouth every day.     • atorvastatin (LIPITOR) 10 MG Tab Take 1 Tab by mouth every day. 100 Tab 0   • amLODIPine (NORVASC) 5 MG Tab TAKE 1 TABLET BY MOUTH ONCE DAILY 100 Tab 0   • levothyroxine (SYNTHROID) 100 MCG Tab TAKE 1 TABLET BY MOUTH ONCE DAILY IN THE MORNING ON AN EMPTY STOMACH 100 Tab 0   • LINZESS 145 MCG Cap TAKE 1 CAPSULE BY MOUTH ONCE DAILY AS NEEDED 90 Cap 0   • Cholecalciferol (VITAMIN D3) 5000 units Cap Take 1 Cap by mouth every day.     • latanoprost (XALATAN) 0.005 % Solution Place 1 Drop in both eyes every evening.     • aspirin 81 MG tablet Take 81 mg by mouth every day.     • vitamin e (VITAMIN E) 400 UNIT CAPS Take  400 Units by mouth every day.       • Omega-3 Fatty Acids (FISH OIL) 1000 MG CPDR Take 1,200 mg by mouth every day.     • lidocaine viscous 2% (XYLOCAINE) 2 % Solution 10 mL swish and spit every 6 hours as needed for mouth pain 100 mL 1     No current facility-administered medications on file prior to visit.      Allergies   Allergen Reactions   • Sulfa Drugs Anaphylaxis     Patient Active Problem List    Diagnosis Date Noted   • Facial basal cell cancer 05/24/2019   • Cataract 11/06/2017   • Mmryzjxur-Uaxmgbr-Epyrjy disease 06/08/2016   • Essential hypertension 04/12/2016   • Dyslipidemia 04/12/2016   • Impaired fasting blood sugar    • Hypothyroidism      Past Medical History:   Diagnosis Date   • Arthritis    • Basal cell carcinoma of skin    • Nfpufgtts-Ujzqbok-Beceoh disease 6/8/2016   • Buttock pain 5/24/2019   • Cataract 10/2014    Left Eye IOL   • Constipation 11/6/2017   • Dyslipidemia 4/12/2016   • Essential hypertension 4/12/2016   • Hypertension    • Hypothyroidism    • IBD (inflammatory bowel disease)    • Impaired fasting blood sugar    • Osteopenia    • Trigger finger, acquired 12/26/2012     ICD-10 transition     Past Surgical History:   Procedure Laterality Date   • KNEE ARTHROSCOPY Right 6/8/2016    Procedure: KNEE ARTHROSCOPY, MEDIAL MENISECTOMY, DEBRIEDMENT OF LATERAL  FEMORAL CONDYLE;  Surgeon: Real Carias M.D.;  Location: [facility];  Service:    • LATERAL RELEASE Right 6/8/2016    Procedure: LATERAL RELEASE;  Surgeon: Real Carias M.D.;  Location: [facility];  Service:    • TRIGGER FINGER RELEASE Bilateral 4/19/2016    Procedure: TRIGGER FINGER RELEASE-RING;  Surgeon: Brett Maddox M.D.;  Location: [facility];  Service:    • TRIGGER FINGER RELEASE  12/26/2012    Performed by Brett Maddox M.D. at [facility]   • COLONOSCOPY  8/2011   • CATARACT EXTRACTION WITH IOL Left 3/2009    left   • TUBAL LIGATION  1980's   • BREAST  "BIOPSY      multiple breast biopsies   • TONSILLECTOMY  as child     Family History   Problem Relation Age of Onset   • Stroke Father    • Hyperlipidemia Brother    • Other Brother         aortic aneurysm repair   • Cancer Maternal Aunt      Social History     Tobacco Use   • Smoking status: Never Smoker   • Smokeless tobacco: Never Used   Substance Use Topics   • Alcohol use: No     Alcohol/week: 0.0 oz   • Drug use: No     ROS:     - Constitutional: Negative for fever, chills, and fatigue/generalized weakness.     - HEENT: Negative for headaches, vision changes, hearing changes, ear pain, sore throat, and neck pain.      - Respiratory: Negative for cough, sputum production, dyspnea and wheezing.    - Cardiovascular: Negative for chest pain, palpitations, orthopnea, and bilateral lower extremity edema.     - Gastrointestinal: Negative for heartburn, nausea, vomiting, abdominal pain, hematochezia, melena, diarrhea, constipation    - Genitourinary: Negative for dysuria, polyuria, hematuria, pyuria, urinary urgency, and urinary incontinence.    - Musculoskeletal: Negative for myalgias, back pain, and joint pain.     - Neurological: Negative for dizziness, tingling, tremors, focal sensory deficit, focal weakness and headaches.     - Psychiatric/Behavioral: Negative for depression, suicidal/homicidal ideation and memory loss.       Physical Exam:     /74 (BP Location: Right arm, Patient Position: Sitting, BP Cuff Size: Adult)   Pulse 70   Temp 36.8 °C (98.2 °F) (Temporal)   Ht 1.651 m (5' 5\")   Wt 78.5 kg (173 lb)   SpO2 97%   BMI 28.79 kg/m²   General: Normal appearing. No distress.  HENT: Normocephalic. Ears normal shape and contour, oropharynx is without erythema, edema or exudates.    Eyes: conjunctiva clear lids without ptosis, pupils equal and reactive to light accommodation  Neck: Supple without JVD or bruit. Thyroid is not enlarged.  Pulmonary: Clear to ausculation.  Normal effort. No rales, ronchi, " or wheezing.  Cardiovascular: Regular rate and rhythm without murmur. Radial pulses are intact, regular and symmetrical bilaterally.  Abdomen: Soft, nontender, nondistended. Normal bowel sounds.  Lymph: No cervical, submandibular or supraclavicular lymph nodes are palpable  Psych: Normal mood and affect. Alert and oriented x3    Note: I have reviewed pertinent labs and diagnostic tests associated with this visit with specific comments listed under the assessment and plan below      Assessment and Plan:     1. Hypothyroidism, unspecified type  -Continue Synthroid 100 mcg daily.  - TSH WITH REFLEX TO FT4; Future  - Comp Metabolic Panel; Future    2. Essential hypertension  Continue amlodipine 5 mg daily  - TSH WITH REFLEX TO FT4; Future  - Comp Metabolic Panel; Future  - HEMOGLOBIN A1C; Future    3. Dyslipidemia  Continue Lipitor 10 mg daily  The 10-year ASCVD risk score (Bernarda WILL Jr., et al., 2013) is: 21.5%  - Lipid Profile; Future    -Patient on aspirin 81 mg daily, calculated based on the aspirin guideline and mentioned that benefit outweighs the risk for taking aspirin.    4.  Prediabetes  - HEMOGLOBIN A1C; Future        Health Maintenance:   Declined Tdap as well as shingles vaccine.    Follow Up:      Return in about 6 months (around 6/26/2020) for follow up.    Please note that this dictation was created using voice recognition software. I have made every reasonable attempt to correct obvious errors, but I expect that there are errors of grammar and possibly content that I did not discover before finalizing the note.    Signed by: Romy Stanton M.D.

## 2019-12-28 ENCOUNTER — HOSPITAL ENCOUNTER (OUTPATIENT)
Dept: LAB | Facility: MEDICAL CENTER | Age: 74
End: 2019-12-28
Attending: INTERNAL MEDICINE
Payer: MEDICARE

## 2019-12-28 DIAGNOSIS — E78.5 DYSLIPIDEMIA: ICD-10-CM

## 2019-12-28 DIAGNOSIS — I10 ESSENTIAL HYPERTENSION: ICD-10-CM

## 2019-12-28 DIAGNOSIS — Z13.1 DIABETES MELLITUS SCREENING: ICD-10-CM

## 2019-12-28 DIAGNOSIS — E03.9 HYPOTHYROIDISM, UNSPECIFIED TYPE: ICD-10-CM

## 2019-12-28 LAB
ALBUMIN SERPL BCP-MCNC: 4.1 G/DL (ref 3.2–4.9)
ALBUMIN/GLOB SERPL: 1.4 G/DL
ALP SERPL-CCNC: 82 U/L (ref 30–99)
ALT SERPL-CCNC: 18 U/L (ref 2–50)
ANION GAP SERPL CALC-SCNC: 8 MMOL/L (ref 0–11.9)
AST SERPL-CCNC: 18 U/L (ref 12–45)
BILIRUB SERPL-MCNC: 0.6 MG/DL (ref 0.1–1.5)
BUN SERPL-MCNC: 14 MG/DL (ref 8–22)
CALCIUM SERPL-MCNC: 9.2 MG/DL (ref 8.5–10.5)
CHLORIDE SERPL-SCNC: 108 MMOL/L (ref 96–112)
CHOLEST SERPL-MCNC: 201 MG/DL (ref 100–199)
CO2 SERPL-SCNC: 27 MMOL/L (ref 20–33)
CREAT SERPL-MCNC: 0.77 MG/DL (ref 0.5–1.4)
EST. AVERAGE GLUCOSE BLD GHB EST-MCNC: 128 MG/DL
GLOBULIN SER CALC-MCNC: 3 G/DL (ref 1.9–3.5)
GLUCOSE SERPL-MCNC: 111 MG/DL (ref 65–99)
HBA1C MFR BLD: 6.1 % (ref 0–5.6)
HDLC SERPL-MCNC: 51 MG/DL
LDLC SERPL CALC-MCNC: 111 MG/DL
POTASSIUM SERPL-SCNC: 4.3 MMOL/L (ref 3.6–5.5)
PROT SERPL-MCNC: 7.1 G/DL (ref 6–8.2)
SODIUM SERPL-SCNC: 143 MMOL/L (ref 135–145)
TRIGL SERPL-MCNC: 197 MG/DL (ref 0–149)
TSH SERPL DL<=0.005 MIU/L-ACNC: 1.05 UIU/ML (ref 0.38–5.33)

## 2019-12-28 PROCEDURE — 80061 LIPID PANEL: CPT

## 2019-12-28 PROCEDURE — 84443 ASSAY THYROID STIM HORMONE: CPT

## 2019-12-28 PROCEDURE — 80053 COMPREHEN METABOLIC PANEL: CPT

## 2019-12-28 PROCEDURE — 36415 COLL VENOUS BLD VENIPUNCTURE: CPT

## 2019-12-28 PROCEDURE — 83036 HEMOGLOBIN GLYCOSYLATED A1C: CPT

## 2019-12-30 ENCOUNTER — TELEPHONE (OUTPATIENT)
Dept: URGENT CARE | Facility: PHYSICIAN GROUP | Age: 74
End: 2019-12-30

## 2019-12-30 DIAGNOSIS — E78.5 DYSLIPIDEMIA: ICD-10-CM

## 2019-12-30 RX ORDER — ATORVASTATIN CALCIUM 20 MG/1
20 TABLET, FILM COATED ORAL DAILY
Qty: 90 TAB | Refills: 1 | Status: SHIPPED | OUTPATIENT
Start: 2019-12-30 | End: 2020-05-06 | Stop reason: SDUPTHER

## 2019-12-31 NOTE — TELEPHONE ENCOUNTER
The 10-year ASCVD risk score (Dentonaustin SOLIS Jr., et al., 2013) is: 21.6%    Called patient and left a voicemail, advised to increase Lipitor 10 mg to 20 mg daily.  -Still A1c show prediabetic range, advised eating healthy options, a lot of vegetable, very much low simple carbohydrate.

## 2020-01-06 NOTE — TELEPHONE ENCOUNTER
1. Caller Name: Kaylee                                           Call Back Number: [phone] (home)         Patient approves a detailed voicemail message: no    Returned Pt call, pt stated they couldn't understand the Vm that was left. Informed the pt of the Dr's notes and pt confirmed they understood.

## 2020-01-22 ENCOUNTER — PATIENT OUTREACH (OUTPATIENT)
Dept: HEALTH INFORMATION MANAGEMENT | Facility: OTHER | Age: 75
End: 2020-01-22

## 2020-01-22 NOTE — PROGRESS NOTES
Outcome: Left Message    Please transfer to Patient Outreach Team at [phone] when patient returns call.        HealthConnect Verified: yes    Attempt # 1

## 2020-05-02 DIAGNOSIS — E03.9 HYPOTHYROIDISM, UNSPECIFIED TYPE: ICD-10-CM

## 2020-05-02 DIAGNOSIS — I10 ESSENTIAL HYPERTENSION: ICD-10-CM

## 2020-05-04 RX ORDER — LEVOTHYROXINE SODIUM 0.1 MG/1
TABLET ORAL
Qty: 100 TAB | Refills: 1 | Status: SHIPPED | OUTPATIENT
Start: 2020-05-04 | End: 2020-11-09

## 2020-05-04 RX ORDER — AMLODIPINE BESYLATE 5 MG/1
TABLET ORAL
Qty: 100 TAB | Refills: 1 | Status: SHIPPED | OUTPATIENT
Start: 2020-05-04 | End: 2020-11-09

## 2020-05-04 NOTE — TELEPHONE ENCOUNTER
Refill X 6 months, sent to pharmacy.Pt. Seen in the last 6 months per protocol. TSH from 12/19 was 1.05.  Lab Results   Component Value Date/Time    SODIUM 143 12/28/2019 09:26 AM    POTASSIUM 4.3 12/28/2019 09:26 AM    CHLORIDE 108 12/28/2019 09:26 AM    CO2 27 12/28/2019 09:26 AM    GLUCOSE 111 (H) 12/28/2019 09:26 AM    BUN 14 12/28/2019 09:26 AM    CREATININE 0.77 12/28/2019 09:26 AM

## 2020-05-06 DIAGNOSIS — E78.5 DYSLIPIDEMIA: ICD-10-CM

## 2020-05-06 NOTE — TELEPHONE ENCOUNTER
Was the patient seen in the last year in this department? Yes    Does patient have an active prescription for medications requested? No     Received Request Via: Pharmacy    Pt met protocol?: Yes     Last OV 12/26/2019    Lab Results   Component Value Date/Time    CHOLSTRLTOT 201 (H) 12/28/2019 0926    TRIGLYCERIDE 197 (H) 12/28/2019 0926    HDL 51 12/28/2019 0926     (H) 12/28/2019 0926

## 2020-05-07 RX ORDER — ATORVASTATIN CALCIUM 20 MG/1
20 TABLET, FILM COATED ORAL DAILY
Qty: 100 TAB | Refills: 1 | Status: SHIPPED | OUTPATIENT
Start: 2020-05-07 | End: 2020-12-28 | Stop reason: SDUPTHER

## 2020-05-07 NOTE — TELEPHONE ENCOUNTER
Pt has had OV within the 12 month protocol and lipid panel is current. 6 month supply sent to pharmacy. Levels high but dose recently increased.   Lab Results   Component Value Date/Time    CHOLSTRLTOT 201 (H) 12/28/2019 09:26 AM     (H) 12/28/2019 09:26 AM    HDL 51 12/28/2019 09:26 AM    TRIGLYCERIDE 197 (H) 12/28/2019 09:26 AM       Lab Results   Component Value Date/Time    SODIUM 143 12/28/2019 09:26 AM    POTASSIUM 4.3 12/28/2019 09:26 AM    CHLORIDE 108 12/28/2019 09:26 AM    CO2 27 12/28/2019 09:26 AM    GLUCOSE 111 (H) 12/28/2019 09:26 AM    BUN 14 12/28/2019 09:26 AM    CREATININE 0.77 12/28/2019 09:26 AM     Lab Results   Component Value Date/Time    ALKPHOSPHAT 82 12/28/2019 09:26 AM    ASTSGOT 18 12/28/2019 09:26 AM    ALTSGPT 18 12/28/2019 09:26 AM    TBILIRUBIN 0.6 12/28/2019 09:26 AM

## 2020-05-20 SDOH — ECONOMIC STABILITY: FOOD INSECURITY: WITHIN THE PAST 12 MONTHS, THE FOOD YOU BOUGHT JUST DIDN'T LAST AND YOU DIDN'T HAVE MONEY TO GET MORE.: NEVER TRUE

## 2020-05-20 SDOH — ECONOMIC STABILITY: FOOD INSECURITY: WITHIN THE PAST 12 MONTHS, YOU WORRIED THAT YOUR FOOD WOULD RUN OUT BEFORE YOU GOT MONEY TO BUY MORE.: NEVER TRUE

## 2020-05-20 SDOH — ECONOMIC STABILITY: TRANSPORTATION INSECURITY
IN THE PAST 12 MONTHS, HAS LACK OF TRANSPORTATION KEPT YOU FROM MEETINGS, WORK, OR FROM GETTING THINGS NEEDED FOR DAILY LIVING?: NO

## 2020-05-20 SDOH — ECONOMIC STABILITY: TRANSPORTATION INSECURITY
IN THE PAST 12 MONTHS, HAS THE LACK OF TRANSPORTATION KEPT YOU FROM MEDICAL APPOINTMENTS OR FROM GETTING MEDICATIONS?: NO

## 2020-05-20 NOTE — PROGRESS NOTES
1. Attempt #:1    2. HealthConnect Verified: yes    3. Verify PCP: yes    4. Review Care Team: yes      6. Reviewed/Updated the following with patient:       •   Communication Preference Obtained? YES       •   Preferred Pharmacy? YES       •   Preferred Lab? YES       •   Family History (document living status of immediate family members and if + hx of cancer, diabetes, hypertension, hyperlipidemia, heart attack, stroke) YES    7. Annual Wellness Visit Scheduling  · Scheduling Status:Scheduled     8. Care Gap Scheduling (Attempt to Schedule EACH Overdue Care Gap!)     There are no preventive care reminders to display for this patient.     Scheduled patient for Annual Wellness Visit     9. [employer] Activation: already active        13. Patient was advised: “This is a free wellness visit. The provider will screen for medical conditions to help you stay healthy. If you have other concerns to address you may be asked to discuss these at a separate visit or there may be an additional fee.”     14. Patient was informed to arrive 15 min prior to their scheduled appointment and bring in their medication bottles.        Called member to introduce myself as their SCP  and schedule AHA/AWV. Member had no questions at this time, direct phone number given for future contact.

## 2020-06-23 ENCOUNTER — OFFICE VISIT (OUTPATIENT)
Dept: MEDICAL GROUP | Facility: PHYSICIAN GROUP | Age: 75
End: 2020-06-23
Payer: MEDICARE

## 2020-06-23 VITALS
DIASTOLIC BLOOD PRESSURE: 80 MMHG | OXYGEN SATURATION: 95 % | HEART RATE: 74 BPM | SYSTOLIC BLOOD PRESSURE: 118 MMHG | BODY MASS INDEX: 30.08 KG/M2 | WEIGHT: 176.2 LBS | TEMPERATURE: 99.1 F | HEIGHT: 64 IN

## 2020-06-23 DIAGNOSIS — R73.03 PREDIABETES: ICD-10-CM

## 2020-06-23 DIAGNOSIS — E78.5 DYSLIPIDEMIA: ICD-10-CM

## 2020-06-23 DIAGNOSIS — E03.9 HYPOTHYROIDISM, UNSPECIFIED TYPE: ICD-10-CM

## 2020-06-23 DIAGNOSIS — I10 ESSENTIAL HYPERTENSION: ICD-10-CM

## 2020-06-23 DIAGNOSIS — E66.9 OBESITY (BMI 30.0-34.9): ICD-10-CM

## 2020-06-23 PROBLEM — E66.811 OBESITY (BMI 30.0-34.9): Status: ACTIVE | Noted: 2020-06-23

## 2020-06-23 PROCEDURE — G0439 PPPS, SUBSEQ VISIT: HCPCS | Performed by: INTERNAL MEDICINE

## 2020-06-23 ASSESSMENT — ENCOUNTER SYMPTOMS: GENERAL WELL-BEING: GOOD

## 2020-06-23 ASSESSMENT — FIBROSIS 4 INDEX: FIB4 SCORE: 1.3

## 2020-06-23 ASSESSMENT — PATIENT HEALTH QUESTIONNAIRE - PHQ9: CLINICAL INTERPRETATION OF PHQ2 SCORE: 0

## 2020-06-23 ASSESSMENT — ACTIVITIES OF DAILY LIVING (ADL): BATHING_REQUIRES_ASSISTANCE: 0

## 2020-06-23 NOTE — PROGRESS NOTES
Chief Complaint   Patient presents with   • Annual Exam         HPI:  Kaylee is a 74 y.o. here for Medicare Annual Wellness Visit    >> Patient has no main concern, patient has some ringing of the ear however denied any trauma, injury, ear infection, difficulty hearing    Patient Active Problem List    Diagnosis Date Noted   • Prediabetes 12/26/2019   • Facial basal cell cancer 05/24/2019   • Cataract 11/06/2017   • Essential hypertension 04/12/2016   • Dyslipidemia 04/12/2016   • Hypothyroidism        Current Outpatient Medications   Medication Sig Dispense Refill   • atorvastatin (LIPITOR) 20 MG Tab Take 1 Tab by mouth every day. 100 Tab 1   • amLODIPine (NORVASC) 5 MG Tab Take 1 tablet by mouth once daily 100 Tab 1   • levothyroxine (SYNTHROID) 100 MCG Tab TAKE 1 TABLET BY MOUTH ONCE DAILY IN THE MORNING ON AN EMPTY STOMACH 100 Tab 1   • timolol (TIMOPTIC) 0.5 % Solution PLACE 1 DROP INTO EACH EYE ONCE DAILY IN THE MORNING  4   • SPIRULINA PO Take  by mouth. 6 tabs daily     • coenzyme Q-10 30 MG capsule Take 60 mg by mouth every day.     • LINZESS 145 MCG Cap TAKE 1 CAPSULE BY MOUTH ONCE DAILY AS NEEDED 90 Cap 0   • lidocaine viscous 2% (XYLOCAINE) 2 % Solution 10 mL swish and spit every 6 hours as needed for mouth pain 100 mL 1   • Cholecalciferol (VITAMIN D3) 5000 units Cap Take 1 Cap by mouth every day.     • latanoprost (XALATAN) 0.005 % Solution Place 1 Drop in both eyes every evening.     • aspirin 81 MG tablet Take 81 mg by mouth every day.     • vitamin e (VITAMIN E) 400 UNIT CAPS Take 400 Units by mouth every day.       • Omega-3 Fatty Acids (FISH OIL) 1000 MG CPDR Take 1,200 mg by mouth every day.     • atorvastatin (LIPITOR) 10 MG Tab Take 1 Tab by mouth every day. (Patient not taking: Reported on 6/23/2020) 100 Tab 0     No current facility-administered medications for this visit.         Patient is taking medications as noted in medication list.  Current supplements as per medication list.      Allergies: Sulfa drugs    Is patient current with immunizations? Yes.    She  reports that she has never smoked. She has never used smokeless tobacco. She reports that she does not drink alcohol or use drugs.  Counseling given: Not Answered        DPA/Advanced directive: Patient has Advanced Directive, but it is not on file. Instructed to bring in a copy to scan into their chart.    ROS:    Gait: Uses no assistive device   Ostomy: No   Other tubes: No   Amputations: No   Chronic oxygen use No   Last eye exam 6/9/2020   Wears hearing aids: No   : Denies any urinary leakage during the last 6 months    Annual Health Assessment Questions:    1.  Are you currently engaging in any exercise or physical activity? No    2.  How would you describe your mood or emotional well-being today? good    3.  Have you had any falls in the last year? No    4.  Have you noticed any problems with your balance or had difficulty walking? Yes    5.  In the last six months have you experienced any leakage of urine? No    6. DPA/Advanced Directive: Patient has Advanced Directive, but it is not on file. Instructed to bring in a copy to scan into their chart.    Depression Screening    Little interest or pleasure in doing things?  0 - not at all  Feeling down, depressed, or hopeless? 0 - not at all  Patient Health Questionnaire Score: 0    If depressive symptoms identified deferred to follow up visit unless specifically addressed in assessment and plan.    Interpretation of PHQ-9 Total Score   Score Severity   1-4 No Depression   5-9 Mild Depression   10-14 Moderate Depression   15-19 Moderately Severe Depression   20-27 Severe Depression    Screening for Cognitive Impairment    Three Minute Recall (village, kitchen, baby) 2  /3    Paul clock face with all 12 numbers and set the hands to show 10 past 10.  Yes    If cognitive concerns identified, deferred for follow up unless specifically addressed in assessment and plan.    Fall Risk  Assessment    Has the patient had two or more falls in the last year or any fall with injury in the last year?  No  If fall risk identified, deferred for follow up unless specifically addressed in assessment and plan.    Safety Assessment    Throw rugs on floor.  No  Handrails on all stairs.  Yes  Good lighting in all hallways.  Yes  Difficulty hearing.  No  Patient counseled about all safety risks that were identified.    Functional Assessment ADLs    Are there any barriers preventing you from cooking for yourself or meeting nutritional needs?  No.    Are there any barriers preventing you from driving safely or obtaining transportation?  No.    Are there any barriers preventing you from using a telephone or calling for help?  No.    Are there any barriers preventing you from shopping?  No.    Are there any barriers preventing you from taking care of your own finances?  No.    Are there any barriers preventing you from managing your medications?  No.    Are there any barriers preventing you from showering, bathing or dressing yourself?  No.    Are you currently engaging in any exercise or physical activity?  No.     What is your perception of your health?  Good.    Health Maintenance Summary                IMM DTaP/Tdap/Td Vaccine Postponed 12/26/2020 Originally 12/5/1964. Patient Refused    BONE DENSITY Next Due 11/13/2020      Done 11/13/2015 DS-BONE DENSITY STUDY (DEXA)     Patient has more history with this topic...    MAMMOGRAM Next Due 12/9/2020      Done 12/9/2019 MA-SCREENING MAMMO BILAT W/TOMOSYNTHESIS W/CAD     Patient has more history with this topic...    COLONOSCOPY Next Due 12/23/2020      Done 12/23/2010 REFERRAL TO GI FOR COLONOSCOPY          Patient Care Team:  Romy Stanton M.D. as PCP - General (Internal Medicine)  Phani Paniagua M.D. (Ophthalmology)  [email or web address] [email or web address] as [facility]     Social History     Tobacco Use   • Smoking status: Never Smoker   •  Smokeless tobacco: Never Used   Substance Use Topics   • Alcohol use: No     Alcohol/week: 0.0 oz   • Drug use: No     Family History   Problem Relation Age of Onset   • Stroke Father    • Other Mother         MULTI SYSTEM FAILURE   • Hyperlipidemia Brother    • Other Brother         aortic aneurysm repair   • Cancer Maternal Aunt      She  has a past medical history of Arthritis, Basal cell carcinoma of skin, Lnysmknir-Ehvdcma-Wsoqer disease (6/8/2016), Xbcjnzmvj-Yqicdyb-Onwbzi disease (6/8/2016), Buttock pain (5/24/2019), Cataract (10/2014), Constipation (11/6/2017), Dyslipidemia (4/12/2016), Essential hypertension (4/12/2016), Hypertension, Hypothyroidism, IBD (inflammatory bowel disease), Impaired fasting blood sugar, Osteopenia, and Trigger finger, acquired (12/26/2012). She also has no past medical history of Anxiety, Arrhythmia, Asthma, Blood transfusion without reported diagnosis, CHF (congestive heart failure) ([facility]), Clotting disorder ([facility]), COPD (chronic obstructive pulmonary disease) ([facility]), Depression, Diabetes ([facility]), GERD (gastroesophageal reflux disease), Heart attack ([facility]), Kidney disease, Migraine, Muscle disorder, Osteoporosis, Seizure ([facility]), Stroke ([facility]), Substance abuse ([facility]), or Urinary tract infection.   Past Surgical History:   Procedure Laterality Date   • KNEE ARTHROSCOPY Right 6/8/2016    Procedure: KNEE ARTHROSCOPY, MEDIAL MENISECTOMY, DEBRIEDMENT OF LATERAL  FEMORAL CONDYLE;  Surgeon: Real Carias M.D.;  Location: [facility];  Service:    • LATERAL RELEASE Right 6/8/2016    Procedure: LATERAL RELEASE;  Surgeon: Real Carias M.D.;  Location: [facility];  Service:    • TRIGGER FINGER RELEASE Bilateral 4/19/2016    Procedure: TRIGGER FINGER RELEASE-RING;  Surgeon: Brett Maddox M.D.;  Location: [facility];  Service:    • TRIGGER FINGER RELEASE  12/26/2012    Performed by Brett Maddox M.D. at [facility]   • COLONOSCOPY  8/2011  "  • CATARACT EXTRACTION WITH IOL Left 3/2009    left   • TUBAL LIGATION  1980's   • BREAST BIOPSY      multiple breast biopsies   • TONSILLECTOMY  as child           Exam:     /80 (BP Location: Left arm, Patient Position: Sitting, BP Cuff Size: Adult)   Pulse 74   Temp 37.3 °C (99.1 °F) (Temporal)   Ht 1.625 m (5' 3.98\")   Wt 79.9 kg (176 lb 3.2 oz)   SpO2 95%  Body mass index is 30.27 kg/m².    Hearing good.    Dentition good  Alert, oriented in no acute distress.  Eye contact is good, speech goal directed, affect calm      Assessment and Plan. The following treatment and monitoring plan is recommended:      1. Hypothyroidism, unspecified type  -Continue Synthroid 100 mcg daily.  TSH:   Lab Results   Component Value Date/Time    TSHULTRASEN 1.050 12/28/2019 0926     THYROXINE (T4):   Lab Results   Component Value Date/Time    FREET4 1.03 12/07/2018 0948        2. Essential hypertension  Continue amlodipine 5 mg daily  -Denied having symptoms,     3. Dyslipidemia  Obesity  4.  Prediabetes  Continue Lipitor 20 mg daily  -Discussed in detail regarding healthy lifestyle, healthy diet, lose weight  -Continue aspirin 81 mg daily    A1c:   Lab Results   Component Value Date/Time    HBA1C 6.1 (H) 12/28/2019 0926    HBA1C 6.1 (H) 04/08/2017 0738    HBA1C 6.2 (H) 02/04/2016 0712    AVGLUC 128 12/28/2019 0926    AVGLUC 128 04/08/2017 0738    AVGLUC 131 02/04/2016 0712        Services suggested: No services needed at this time  Health Care Screening recommendations as per orders if indicated.  Referrals offered: PT/OT/Nutrition counseling/Behavioral Health/Smoking cessation as per orders if indicated.    Discussion today about general wellness and lifestyle habits:    · Prevent falls and reduce trip hazards; Cautioned about securing or removing rugs.  · Have a working fire alarm and carbon monoxide detector;   · Engage in regular physical activity and social activities       Follow-up: No follow-ups on file.    "

## 2020-09-04 ENCOUNTER — OFFICE VISIT (OUTPATIENT)
Dept: URGENT CARE | Facility: PHYSICIAN GROUP | Age: 75
End: 2020-09-04
Payer: MEDICARE

## 2020-09-04 VITALS
OXYGEN SATURATION: 97 % | HEART RATE: 77 BPM | RESPIRATION RATE: 16 BRPM | DIASTOLIC BLOOD PRESSURE: 70 MMHG | SYSTOLIC BLOOD PRESSURE: 126 MMHG | TEMPERATURE: 99.3 F

## 2020-09-04 DIAGNOSIS — S10.96XA INSECT BITE OF NECK, INITIAL ENCOUNTER: ICD-10-CM
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
## 2020-09-04 PROCEDURE — 99213 OFFICE O/P EST LOW 20 MIN: CPT | Performed by: PHYSICIAN ASSISTANT

## 2020-09-04 ASSESSMENT — ENCOUNTER SYMPTOMS
SHORTNESS OF BREATH: 0
CHILLS: 0
FOCAL WEAKNESS: 0
FEVER: 0
SENSORY CHANGE: 0
TINGLING: 0
COUGH: 0

## 2020-09-04 NOTE — PROGRESS NOTES
Subjective:   Kaylee Nazario  is a 74 y.o. female who presents for Insect Bite (left side of neck lastnight)      HPI    Patient comes clinic out of concern for insect bite to left side of neck occurred last night.  She is concerned stinger may still be in the skin.  She states last night she was at the [employer] playing slot machine when she felt an insect bite on the left side of neck.  She has had continued irritation with shirt brushing against this area and sensation of pain.  She feels there may be a stinger stuck in the area and requests close inspection.  She states her family attempted to pull it out last night with tweezers.    Review of Systems   Constitutional: Negative for chills and fever.   Respiratory: Negative for cough and shortness of breath.    Skin: Negative for rash.        Insect bite left-sided neck   Neurological: Negative for tingling, sensory change and focal weakness.       Allergies   Allergen Reactions   • Sulfa Drugs Anaphylaxis        Objective:   /70 (BP Location: Right arm, Patient Position: Sitting, BP Cuff Size: Large adult)   Pulse 77   Temp 37.4 °C (99.3 °F) (Temporal)   Resp 16   SpO2 97%     Physical Exam  Vitals signs and nursing note reviewed.   Constitutional:       General: She is not in acute distress.     Appearance: She is well-developed. She is not diaphoretic.   HENT:      Head: Normocephalic and atraumatic.      Right Ear: External ear normal.      Left Ear: External ear normal.      Nose: Nose normal.   Eyes:      General: Lids are normal. No scleral icterus.        Right eye: No discharge.         Left eye: No discharge.      Conjunctiva/sclera: Conjunctivae normal.   Neck:      Musculoskeletal: Full passive range of motion without pain, normal range of motion and neck supple. Normal range of motion. Erythema present. No edema, neck rigidity, injury, pain with movement or muscular tenderness.        Comments: Erythematous circular lesion on left side  of neck, central area slightly darker with flap of skin, no evident foreign body, nonvesicular, non-ulcerative, nonpustular, no extension of erythema  Pulmonary:      Effort: Pulmonary effort is normal. No respiratory distress.   Musculoskeletal: Normal range of motion.   Skin:     General: Skin is warm and dry.      Coloration: Skin is not pale.      Findings: No erythema.   Neurological:      Mental Status: She is alert and oriented to person, place, and time. She is not disoriented.   Psychiatric:         Speech: Speech normal.         Behavior: Behavior normal.       Procedure:  Lesion of left-sided neck close inspection, remove dark central part of skin with splinter forceps.  Patient tolerated well.    Assessment/Plan:   1. Insect bite of neck, initial encounter  No clear removal of stinger but lesion appears noninfectious, nonvesicular to be healing well, with continued sensation use hydrocortisone cream OTC or baking soda paste  Return to clinic with lack of resolution or progression of symptoms.      I have worn an N95 mask, gloves and eye protection for the entire encounter with this patient.     Differential diagnosis, natural history, supportive care, and indications for immediate follow-up discussed.

## 2020-11-06 DIAGNOSIS — I10 ESSENTIAL HYPERTENSION: ICD-10-CM

## 2020-11-06 DIAGNOSIS — E03.9 HYPOTHYROIDISM, UNSPECIFIED TYPE: ICD-10-CM

## 2020-11-09 RX ORDER — AMLODIPINE BESYLATE 5 MG/1
TABLET ORAL
Qty: 100 TAB | Refills: 1 | Status: SHIPPED | OUTPATIENT
Start: 2020-11-09 | End: 2020-12-28 | Stop reason: SDUPTHER

## 2020-11-09 RX ORDER — LEVOTHYROXINE SODIUM 0.1 MG/1
TABLET ORAL
Qty: 100 TAB | Refills: 1 | Status: SHIPPED | OUTPATIENT
Start: 2020-11-09 | End: 2020-12-28 | Stop reason: SDUPTHER

## 2020-12-21 ENCOUNTER — TELEPHONE (OUTPATIENT)
Dept: MEDICAL GROUP | Facility: PHYSICIAN GROUP | Age: 75
End: 2020-12-21

## 2020-12-21 NOTE — TELEPHONE ENCOUNTER
ESTABLISHED PATIENT PRE-VISIT PLANNING     Patient was NOT contacted to complete PVP.     Note: Patient will not be contacted if there is no indication to call.     1.  Reviewed notes from the last few office visits within the medical group: Yes    2.  If any orders were placed at last visit or intended to be done for this visit (i.e. 6 mos follow-up), do we have Results/Consult Notes?         •  Labs - Labs were not ordered at last office visit.  Note: If patient appointment is for lab review and patient did not complete labs, check with provider if OK to reschedule patient until labs completed.       •  Imaging - Imaging was not ordered at last office visit.       •  Referrals - No referrals were ordered at last office visit.    3. Is this appointment scheduled as a Hospital Follow-Up? No    4.  Immunizations were updated in Epic using Reconcile Outside Information activity? Yes    5.  Patient is due for the following Health Maintenance Topics:   Health Maintenance Due   Topic Date Due   • BONE DENSITY  11/13/2020   • Annual Wellness Visit  12/03/2020   • MAMMOGRAM  12/09/2020   • COLONOSCOPY  12/23/2020           6.  AHA (Pulse8) form printed for Provider? No, already completed

## 2020-12-28 ENCOUNTER — OFFICE VISIT (OUTPATIENT)
Dept: MEDICAL GROUP | Facility: PHYSICIAN GROUP | Age: 75
End: 2020-12-28
Payer: MEDICARE

## 2020-12-28 VITALS
SYSTOLIC BLOOD PRESSURE: 140 MMHG | HEART RATE: 68 BPM | BODY MASS INDEX: 30.42 KG/M2 | DIASTOLIC BLOOD PRESSURE: 64 MMHG | TEMPERATURE: 98 F | WEIGHT: 178.2 LBS | OXYGEN SATURATION: 94 % | HEIGHT: 64 IN

## 2020-12-28 DIAGNOSIS — E03.9 HYPOTHYROIDISM, UNSPECIFIED TYPE: ICD-10-CM

## 2020-12-28 DIAGNOSIS — R73.03 PREDIABETES: ICD-10-CM

## 2020-12-28 DIAGNOSIS — I10 ESSENTIAL HYPERTENSION: ICD-10-CM

## 2020-12-28 DIAGNOSIS — E66.9 OBESITY (BMI 30.0-34.9): ICD-10-CM

## 2020-12-28 DIAGNOSIS — E78.5 DYSLIPIDEMIA: ICD-10-CM

## 2020-12-28 PROCEDURE — 99214 OFFICE O/P EST MOD 30 MIN: CPT | Performed by: INTERNAL MEDICINE

## 2020-12-28 RX ORDER — ATORVASTATIN CALCIUM 20 MG/1
20 TABLET, FILM COATED ORAL DAILY
Qty: 100 TAB | Refills: 1 | Status: SHIPPED | OUTPATIENT
Start: 2020-12-28 | End: 2021-07-16

## 2020-12-28 RX ORDER — AMLODIPINE BESYLATE 5 MG/1
5 TABLET ORAL DAILY
Qty: 100 TAB | Refills: 1 | Status: SHIPPED | OUTPATIENT
Start: 2020-12-28 | End: 2021-05-21

## 2020-12-28 RX ORDER — LEVOTHYROXINE SODIUM 0.1 MG/1
100 TABLET ORAL
Qty: 100 TAB | Refills: 1 | Status: SHIPPED | OUTPATIENT
Start: 2020-12-28 | End: 2021-05-21

## 2020-12-28 NOTE — PROGRESS NOTES
Established Patient    Chief Complaint   Patient presents with   • Follow-Up       Subjective:     HPI:   Kaylee presents today with the following.    1. Essential hypertension  chronic, stable, mention compliant with amlodipine below  - amLODIPine (NORVASC) 5 MG Tab; Take 1 Tab by mouth every day.  Dispense: 100 Tab; Refill: 1      2. Hypothyroidism, unspecified type  chronic, stable, denied having related symptoms, mention compliant with below  - levothyroxine (SYNTHROID) 100 MCG Tab; Take 1 Tab by mouth Every morning on an empty stomach.  Dispense: 100 Tab; Refill: 1      4. Prediabetes  5. Obesity (BMI 30.0-34.9)  patient try to eat healthier option, less sugar and carbs, try to have some exercise as well  -would like to do some blood work as well    Patient Active Problem List    Diagnosis Date Noted   • Obesity (BMI 30.0-34.9) 06/23/2020   • Prediabetes 12/26/2019   • Facial basal cell cancer 05/24/2019   • Cataract 11/06/2017   • Essential hypertension 04/12/2016   • Dyslipidemia 04/12/2016   • Hypothyroidism        Current Outpatient Medications on File Prior to Visit   Medication Sig Dispense Refill   • timolol (TIMOPTIC) 0.5 % Solution PLACE 1 DROP INTO EACH EYE ONCE DAILY IN THE MORNING  4   • SPIRULINA PO Take  by mouth. 6 tabs daily     • coenzyme Q-10 30 MG capsule Take 60 mg by mouth every day.     • Cholecalciferol (VITAMIN D3) 5000 units Cap Take 1 Cap by mouth every day.     • latanoprost (XALATAN) 0.005 % Solution Place 1 Drop in both eyes every evening.     • aspirin 81 MG tablet Take 81 mg by mouth every day.     • vitamin e (VITAMIN E) 400 UNIT CAPS Take 400 Units by mouth every day.       • Omega-3 Fatty Acids (FISH OIL) 1000 MG CPDR Take 1,200 mg by mouth every day.     • lidocaine viscous 2% (XYLOCAINE) 2 % Solution 10 mL swish and spit every 6 hours as needed for mouth pain (Patient not taking: Reported on 12/28/2020) 100 mL 1     No current facility-administered medications on file prior  "to visit.        Allergies, past medical history, past surgical history, family history, social history reviewed and updated    ROS:  All other systems reviewed and are negative except as stated in the HPI     Physical Exam:     /64 (BP Location: Right arm, Patient Position: Sitting, BP Cuff Size: Large adult)   Pulse 68   Temp 36.7 °C (98 °F) (Temporal)   Ht 1.625 m (5' 3.98\")   Wt 80.8 kg (178 lb 3.2 oz)   SpO2 94%   BMI 30.61 kg/m²   General: Normal appearing. No distress.  ENT: oropharynx without exudates.    Eyes: conjunctiva clear lids without ptosis  Pulmonary: Clear to ausculation.  Normal effort.   Cardiovascular: Regular rate and rhythm  Abdomen: Soft, nontender,  Lymph: No cervical or supraclavicular palpable lymph nodes  Psych: Normal mood and affect.     I have reviewed pertinent labs and diagnostic tests associated with this visit with specific comments listed under the assessment and plan below      Assessment and Plan:     75 y.o. female with the following issues.    1. Essential hypertension  - amLODIPine (NORVASC) 5 MG Tab; Take 1 Tab by mouth every day.  Dispense: 100 Tab; Refill: 1  - Comp Metabolic Panel; Future    2. Hypothyroidism, unspecified type  - levothyroxine (SYNTHROID) 100 MCG Tab; Take 1 Tab by mouth Every morning on an empty stomach.  Dispense: 100 Tab; Refill: 1  - T3 FREE; Future  - TSH+FREE T4    3. Dyslipidemia  - atorvastatin (LIPITOR) 20 MG Tab; Take 1 Tab by mouth every day.  Dispense: 100 Tab; Refill: 1  - Lipid Profile; Future    4. Prediabetes  5. Obesity (BMI 30.0-34.9)  - HEMOGLOBIN A1C; Future  - MAGNESIUM, RBC; Future  - VITAMIN B12; Future  - VITAMIN D,25 HYDROXY; Future        Follow Up:      Return in about 4 weeks (around 1/25/2021) for follow up.   Prediabetes, obesity, follow-up with her   start      Please note that this dictation was created using voice recognition software. I have made every reasonable attempt to correct obvious errors, but I " expect that there are errors of grammar and possibly content that I did not discover before finalizing the note.    Signed by: Romy Stanton M.D.

## 2021-01-11 DIAGNOSIS — Z23 NEED FOR VACCINATION: ICD-10-CM

## 2021-01-16 ENCOUNTER — HOSPITAL ENCOUNTER (OUTPATIENT)
Dept: RADIOLOGY | Facility: MEDICAL CENTER | Age: 76
End: 2021-01-16
Attending: INTERNAL MEDICINE
Payer: MEDICARE

## 2021-01-16 DIAGNOSIS — Z12.31 VISIT FOR SCREENING MAMMOGRAM: ICD-10-CM

## 2021-01-16 PROCEDURE — 77063 BREAST TOMOSYNTHESIS BI: CPT

## 2021-01-18 ENCOUNTER — HOSPITAL ENCOUNTER (OUTPATIENT)
Dept: LAB | Facility: MEDICAL CENTER | Age: 76
End: 2021-01-18
Attending: INTERNAL MEDICINE
Payer: MEDICARE

## 2021-01-18 DIAGNOSIS — R73.03 PREDIABETES: ICD-10-CM

## 2021-01-18 DIAGNOSIS — E03.9 HYPOTHYROIDISM, UNSPECIFIED TYPE: ICD-10-CM

## 2021-01-18 DIAGNOSIS — E78.5 DYSLIPIDEMIA: ICD-10-CM

## 2021-01-18 DIAGNOSIS — I10 ESSENTIAL HYPERTENSION: ICD-10-CM

## 2021-01-18 DIAGNOSIS — E66.9 OBESITY (BMI 30.0-34.9): ICD-10-CM

## 2021-01-18 LAB
25(OH)D3 SERPL-MCNC: 66 NG/ML (ref 30–100)
ALBUMIN SERPL BCP-MCNC: 4.1 G/DL (ref 3.2–4.9)
ALBUMIN/GLOB SERPL: 1.3 G/DL
ALP SERPL-CCNC: 76 U/L (ref 30–99)
ALT SERPL-CCNC: 21 U/L (ref 2–50)
ANION GAP SERPL CALC-SCNC: 10 MMOL/L (ref 7–16)
AST SERPL-CCNC: 24 U/L (ref 12–45)
BILIRUB SERPL-MCNC: 0.5 MG/DL (ref 0.1–1.5)
BUN SERPL-MCNC: 16 MG/DL (ref 8–22)
CALCIUM SERPL-MCNC: 9.5 MG/DL (ref 8.5–10.5)
CHLORIDE SERPL-SCNC: 108 MMOL/L (ref 96–112)
CHOLEST SERPL-MCNC: 151 MG/DL (ref 100–199)
CO2 SERPL-SCNC: 26 MMOL/L (ref 20–33)
CREAT SERPL-MCNC: 0.73 MG/DL (ref 0.5–1.4)
EST. AVERAGE GLUCOSE BLD GHB EST-MCNC: 123 MG/DL
GLOBULIN SER CALC-MCNC: 3.1 G/DL (ref 1.9–3.5)
GLUCOSE SERPL-MCNC: 102 MG/DL (ref 65–99)
HBA1C MFR BLD: 5.9 % (ref 0–5.6)
HDLC SERPL-MCNC: 54 MG/DL
LDLC SERPL CALC-MCNC: 78 MG/DL
POTASSIUM SERPL-SCNC: 4.2 MMOL/L (ref 3.6–5.5)
PROT SERPL-MCNC: 7.2 G/DL (ref 6–8.2)
SODIUM SERPL-SCNC: 144 MMOL/L (ref 135–145)
T3FREE SERPL-MCNC: 2.36 PG/ML (ref 2–4.4)
T4 FREE SERPL-MCNC: 1.54 NG/DL (ref 0.93–1.7)
TRIGL SERPL-MCNC: 97 MG/DL (ref 0–149)
TSH SERPL DL<=0.005 MIU/L-ACNC: 1.12 UIU/ML (ref 0.38–5.33)
VIT B12 SERPL-MCNC: 388 PG/ML (ref 211–911)

## 2021-01-18 PROCEDURE — 82607 VITAMIN B-12: CPT

## 2021-01-18 PROCEDURE — 84439 ASSAY OF FREE THYROXINE: CPT

## 2021-01-18 PROCEDURE — 80061 LIPID PANEL: CPT

## 2021-01-18 PROCEDURE — 36415 COLL VENOUS BLD VENIPUNCTURE: CPT

## 2021-01-18 PROCEDURE — 83735 ASSAY OF MAGNESIUM: CPT

## 2021-01-18 PROCEDURE — 84443 ASSAY THYROID STIM HORMONE: CPT

## 2021-01-18 PROCEDURE — 80053 COMPREHEN METABOLIC PANEL: CPT

## 2021-01-18 PROCEDURE — 83036 HEMOGLOBIN GLYCOSYLATED A1C: CPT

## 2021-01-18 PROCEDURE — 84481 FREE ASSAY (FT-3): CPT

## 2021-01-18 PROCEDURE — 82306 VITAMIN D 25 HYDROXY: CPT

## 2021-01-19 DIAGNOSIS — G89.29 CHRONIC PAIN OF RIGHT KNEE: ICD-10-CM

## 2021-01-19 DIAGNOSIS — M25.561 CHRONIC PAIN OF RIGHT KNEE: ICD-10-CM

## 2021-01-19 RX ORDER — GABAPENTIN 100 MG/1
100 CAPSULE ORAL 3 TIMES DAILY
Qty: 180 CAP | Refills: 3 | Status: SHIPPED | OUTPATIENT
Start: 2021-01-19 | End: 2021-09-20

## 2021-01-19 NOTE — PROGRESS NOTES
1. Chronic pain of right knee  >> Mention she had a meniscal tear and injury 3 years ago status post surgical operation, however since then she had a chronic pain of the left knee, mention will respond to gabapentin that she was taking from her  as well.  She would like to have gabapentin prescription  - gabapentin (NEURONTIN) 100 MG Cap; Take 1 Cap by mouth 3 times a day.  Dispense: 180 Cap; Refill: 3

## 2021-01-21 LAB — MAGNESIUM RBC-SCNC: 2.6 MMOL/L (ref 1.5–3.1)

## 2021-03-10 ENCOUNTER — TELEPHONE (OUTPATIENT)
Dept: MEDICAL GROUP | Facility: PHYSICIAN GROUP | Age: 76
End: 2021-03-10

## 2021-03-10 NOTE — TELEPHONE ENCOUNTER
ESTABLISHED PATIENT PRE-VISIT PLANNING     Patient was NOT contacted to complete PVP.     Note: Patient will not be contacted if there is no indication to call.     1.  Reviewed notes from the last few office visits within the medical group: Yes    2.  If any orders were placed at last visit or intended to be done for this visit (i.e. 6 mos follow-up), do we have Results/Consult Notes?         •  Labs - Labs ordered, completed on 1/18/21 and results are in chart.  Note: If patient appointment is for lab review and patient did not complete labs, check with provider if OK to reschedule patient until labs completed.       •  Imaging - Imaging ordered, completed and results are in chart.       •  Referrals - No referrals were ordered at last office visit.    3. Is this appointment scheduled as a Hospital Follow-Up? No    4.  Immunizations were updated in Epic using Reconcile Outside Information activity? Yes    5.  Patient is due for the following Health Maintenance Topics:   Health Maintenance Due   Topic Date Due   • COVID-19 Vaccine (1 of 2) Never done   • IMM DTaP/Tdap/Td Vaccine (1 - Tdap) Never done   • BONE DENSITY  11/13/2020   • Annual Wellness Visit  12/03/2020   • COLONOSCOPY  12/23/2020       6.  AHA (Pulse8) form printed for Provider? Yes

## 2021-03-18 ENCOUNTER — OFFICE VISIT (OUTPATIENT)
Dept: MEDICAL GROUP | Facility: PHYSICIAN GROUP | Age: 76
End: 2021-03-18
Payer: MEDICARE

## 2021-03-18 VITALS
WEIGHT: 164 LBS | RESPIRATION RATE: 12 BRPM | DIASTOLIC BLOOD PRESSURE: 78 MMHG | SYSTOLIC BLOOD PRESSURE: 122 MMHG | BODY MASS INDEX: 28 KG/M2 | HEIGHT: 64 IN | TEMPERATURE: 97.4 F | HEART RATE: 62 BPM | OXYGEN SATURATION: 95 %

## 2021-03-18 DIAGNOSIS — E66.9 OBESITY (BMI 30.0-34.9): ICD-10-CM

## 2021-03-18 DIAGNOSIS — R73.03 PREDIABETES: ICD-10-CM

## 2021-03-18 DIAGNOSIS — E78.5 DYSLIPIDEMIA: ICD-10-CM

## 2021-03-18 DIAGNOSIS — I10 ESSENTIAL HYPERTENSION: ICD-10-CM

## 2021-03-18 PROCEDURE — 99214 OFFICE O/P EST MOD 30 MIN: CPT | Performed by: INTERNAL MEDICINE

## 2021-03-18 ASSESSMENT — PATIENT HEALTH QUESTIONNAIRE - PHQ9: CLINICAL INTERPRETATION OF PHQ2 SCORE: 0

## 2021-03-18 NOTE — PROGRESS NOTES
Established Patient    Chief Complaint   Patient presents with   • Follow-Up     routine fv        Subjective:     HPI:   Kaylee presents today with the following.    1. Dyslipidemia  2. Essential hypertension  3. Obesity (BMI 30.0-34.9)  4. Prediabetes  Blood pressure stable, patient also losing weight, mention she lost 14 pounds since last, she is trying to eat healthier option with more modified Atkins diets, also A1c improving, as well as cholesterol panels, reported compliant with Lipitor 20 mg daily, as well as amlodipine 5 mg daily, otherwise denies any other related symptoms      Patient Active Problem List    Diagnosis Date Noted   • Chronic pain of right knee 01/19/2021   • Obesity (BMI 30.0-34.9) 06/23/2020   • Prediabetes 12/26/2019   • Facial basal cell cancer 05/24/2019   • Cataract 11/06/2017   • Essential hypertension 04/12/2016   • Dyslipidemia 04/12/2016   • Hypothyroidism        Current Outpatient Medications on File Prior to Visit   Medication Sig Dispense Refill   • gabapentin (NEURONTIN) 100 MG Cap Take 1 Cap by mouth 3 times a day. (Patient taking differently: Take 100 mg by mouth 3 times a day. Only taking 1 cap 2 times a day) 180 Cap 3   • amLODIPine (NORVASC) 5 MG Tab Take 1 Tab by mouth every day. 100 Tab 1   • levothyroxine (SYNTHROID) 100 MCG Tab Take 1 Tab by mouth Every morning on an empty stomach. 100 Tab 1   • atorvastatin (LIPITOR) 20 MG Tab Take 1 Tab by mouth every day. 100 Tab 1   • timolol (TIMOPTIC) 0.5 % Solution PLACE 1 DROP INTO EACH EYE ONCE DAILY IN THE MORNING  4   • lidocaine viscous 2% (XYLOCAINE) 2 % Solution 10 mL swish and spit every 6 hours as needed for mouth pain 100 mL 1   • Cholecalciferol (VITAMIN D3) 5000 units Cap Take 1 Cap by mouth every day.     • latanoprost (XALATAN) 0.005 % Solution Place 1 Drop in both eyes every evening.     • aspirin 81 MG tablet Take 81 mg by mouth every day.     • vitamin e (VITAMIN E) 400 UNIT CAPS Take 400 Units by mouth every  "day.       • Omega-3 Fatty Acids (FISH OIL) 1000 MG CPDR Take 1,200 mg by mouth every day.       No current facility-administered medications on file prior to visit.       Allergies, past medical history, past surgical history, family history, social history reviewed and updated    ROS:  All other systems reviewed and are negative except as stated in the HPI     Physical Exam:     /78 (BP Location: Right arm, Patient Position: Sitting, BP Cuff Size: Adult)   Pulse 62   Temp 36.3 °C (97.4 °F)   Resp 12   Ht 1.625 m (5' 3.98\")   Wt 74.4 kg (164 lb)   SpO2 95%   BMI 28.17 kg/m²   General: Normal appearing. No distress.  ENT: oropharynx without exudates.    Eyes: conjunctiva clear lids without ptosis  Pulmonary: Clear to ausculation.  Normal effort.   Cardiovascular: Regular rate and rhythm  Abdomen: Soft, nontender,  Lymph: No cervical or supraclavicular palpable lymph nodes  Psych: Normal mood and affect.     I have reviewed pertinent labs and diagnostic tests associated with this visit with specific comments listed under the assessment and plan below      Assessment and Plan:     75 y.o. female with the following issues.    1. Dyslipidemia  2. Essential hypertension  3. Obesity (BMI 30.0-34.9)  4. Prediabetes  -Continue bladder medication, continue statin  -Lengthy discussion regarding healthy dietary options including plenty of vegetable, avoid sugars, regular exercise as tolerated, healthy fat/protein/carbs  -Patient would like to continue these lifestyle changes to improve obesity/overweight as well as other chronic conditions    Follow Up:      Return in about 6 months (around 9/18/2021) for follow up.  Chronic condition, lifestyle  Please note that this dictation was created using voice recognition software. I have made every reasonable attempt to correct obvious errors, but I expect that there are errors of grammar and possibly content that I did not discover before finalizing the note.    Signed " by: Romy Stanton M.D.

## 2021-05-21 DIAGNOSIS — E03.9 HYPOTHYROIDISM, UNSPECIFIED TYPE: ICD-10-CM

## 2021-05-21 DIAGNOSIS — I10 ESSENTIAL HYPERTENSION: ICD-10-CM

## 2021-05-25 RX ORDER — AMLODIPINE BESYLATE 5 MG/1
TABLET ORAL
Qty: 100 TABLET | Refills: 1 | Status: SHIPPED | OUTPATIENT
Start: 2021-05-25 | End: 2022-04-20

## 2021-05-25 RX ORDER — LEVOTHYROXINE SODIUM 0.1 MG/1
TABLET ORAL
Qty: 100 TABLET | Refills: 2 | Status: SHIPPED | OUTPATIENT
Start: 2021-05-25 | End: 2021-11-12 | Stop reason: SDUPTHER

## 2021-05-25 NOTE — TELEPHONE ENCOUNTER
Last seen by PCP 03/18/2021.     TSH   Date Value Ref Range Status   01/18/2021 1.120 0.380 - 5.330 uIU/mL Final     Comment:     Please note new reference ranges effective 12/14/2017 10:00 AM  Pregnant Females, 1st Trimester  0.050-3.700  Pregnant Females, 2nd Trimester  0.310-4.350  Pregnant Females, 3rd Trimester  0.410-5.180        3/18/21 12/28/20 9/4/20   Blood Pressure  122/78 140/64 126/70        Lab Results   Component Value Date/Time    SODIUM 144 01/18/2021 08:40 AM    POTASSIUM 4.2 01/18/2021 08:40 AM    CHLORIDE 108 01/18/2021 08:40 AM    CO2 26 01/18/2021 08:40 AM    GLUCOSE 102 (H) 01/18/2021 08:40 AM    BUN 16 01/18/2021 08:40 AM    CREATININE 0.73 01/18/2021 08:40 AM

## 2021-07-16 DIAGNOSIS — E78.5 DYSLIPIDEMIA: ICD-10-CM

## 2021-07-19 RX ORDER — ATORVASTATIN CALCIUM 20 MG/1
TABLET, FILM COATED ORAL
Qty: 100 TABLET | Refills: 1 | Status: SHIPPED | OUTPATIENT
Start: 2021-07-19 | End: 2022-02-14

## 2021-07-19 NOTE — TELEPHONE ENCOUNTER
Pt has had OV within the 12 month protocol and lipid panel is current. 6 month supply sent to pharmacy.   Lab Results   Component Value Date/Time    CHOLSTRLTOT 151 01/18/2021 08:40 AM    LDL 78 01/18/2021 08:40 AM    HDL 54 01/18/2021 08:40 AM    TRIGLYCERIDE 97 01/18/2021 08:40 AM       Lab Results   Component Value Date/Time    SODIUM 144 01/18/2021 08:40 AM    POTASSIUM 4.2 01/18/2021 08:40 AM    CHLORIDE 108 01/18/2021 08:40 AM    CO2 26 01/18/2021 08:40 AM    GLUCOSE 102 (H) 01/18/2021 08:40 AM    BUN 16 01/18/2021 08:40 AM    CREATININE 0.73 01/18/2021 08:40 AM     Lab Results   Component Value Date/Time    ALKPHOSPHAT 76 01/18/2021 08:40 AM    ASTSGOT 24 01/18/2021 08:40 AM    ALTSGPT 21 01/18/2021 08:40 AM    TBILIRUBIN 0.5 01/18/2021 08:40 AM

## 2021-08-02 ENCOUNTER — PATIENT OUTREACH (OUTPATIENT)
Dept: HEALTH INFORMATION MANAGEMENT | Facility: OTHER | Age: 76
End: 2021-08-02

## 2021-08-02 NOTE — NON-PROVIDER
[employer] verified. member called wanting enrollment information for a friend and advised to call [phone] as open enrollment begins in October. Advised per member [facility] preferred plan she had $50 pre quarter for OTC at [email or web address] advised log in is [employer] number and password is members . Member also inquired on gym information and advised [email or web address] for information as well. Advised member [facility] does not pay for painting classes. used pq.

## 2021-08-26 ENCOUNTER — PATIENT MESSAGE (OUTPATIENT)
Dept: HEALTH INFORMATION MANAGEMENT | Facility: OTHER | Age: 76
End: 2021-08-26

## 2021-09-20 ENCOUNTER — APPOINTMENT (OUTPATIENT)
Dept: RADIOLOGY | Facility: IMAGING CENTER | Age: 76
End: 2021-09-20
Attending: INTERNAL MEDICINE
Payer: MEDICARE

## 2021-09-20 ENCOUNTER — OFFICE VISIT (OUTPATIENT)
Dept: MEDICAL GROUP | Facility: PHYSICIAN GROUP | Age: 76
End: 2021-09-20
Payer: MEDICARE

## 2021-09-20 VITALS
TEMPERATURE: 97.3 F | HEART RATE: 69 BPM | OXYGEN SATURATION: 96 % | WEIGHT: 171.9 LBS | DIASTOLIC BLOOD PRESSURE: 80 MMHG | HEIGHT: 64 IN | SYSTOLIC BLOOD PRESSURE: 124 MMHG | BODY MASS INDEX: 29.35 KG/M2

## 2021-09-20 DIAGNOSIS — G89.29 CHRONIC PAIN OF LEFT KNEE: ICD-10-CM

## 2021-09-20 DIAGNOSIS — M25.562 CHRONIC PAIN OF LEFT KNEE: ICD-10-CM

## 2021-09-20 DIAGNOSIS — I10 ESSENTIAL HYPERTENSION: ICD-10-CM

## 2021-09-20 DIAGNOSIS — H93.13 TINNITUS OF BOTH EARS: ICD-10-CM

## 2021-09-20 PROCEDURE — 73564 X-RAY EXAM KNEE 4 OR MORE: CPT | Mod: TC,LT | Performed by: INTERNAL MEDICINE

## 2021-09-20 PROCEDURE — 99214 OFFICE O/P EST MOD 30 MIN: CPT | Performed by: INTERNAL MEDICINE

## 2021-09-20 RX ORDER — BLUE-GREEN ALGAE
POWDER (GRAM) MISCELLANEOUS
COMMUNITY
End: 2022-04-01

## 2021-09-20 NOTE — PROGRESS NOTES
Established Patient    Chief Complaint   Patient presents with   • Follow-Up       Subjective:     HPI:   Kaylee presents today with the following.    1. Chronic pain of left knee  Reported chronic pain of the left knee, she was depending on her left knee before due to her chronic right knee pain as well, patient has some swelling and mild tenderness on the medial aspect of the left knee, no redness, no hotness, no trauma or injury    3. Tinnitus of both ears  Chronic for 6 months, denied having hearing problem, ear pain or discharge      Patient Active Problem List    Diagnosis Date Noted   • Chronic pain of left knee 09/20/2021   • Tinnitus of both ears 09/20/2021   • Chronic pain of right knee 01/19/2021   • Obesity (BMI 30.0-34.9) 06/23/2020   • Prediabetes 12/26/2019   • Facial basal cell cancer 05/24/2019   • Cataract 11/06/2017   • Essential hypertension 04/12/2016   • Dyslipidemia 04/12/2016   • Hypothyroidism        Current Outpatient Medications on File Prior to Visit   Medication Sig Dispense Refill   • NON SPECIFIED Super Colon Cleanse 2 caps     • Spirulina Powder      • NON SPECIFIED Arches Tinnitis- BID     • atorvastatin (LIPITOR) 20 MG Tab Take 1 tablet by mouth once daily 100 tablet 1   • amLODIPine (NORVASC) 5 MG Tab Take 1 tablet by mouth once daily 100 tablet 1   • levothyroxine (SYNTHROID) 100 MCG Tab TAKE 1 TABLET BY MOUTH ONCE DAILY IN THE MORNING ON AN EMPTY STOMACH 100 tablet 2   • Cholecalciferol (VITAMIN D3) 5000 units Cap Take 1 Cap by mouth every day.     • latanoprost (XALATAN) 0.005 % Solution Place 1 Drop in both eyes every evening.     • aspirin 81 MG tablet Take 81 mg by mouth every day.     • vitamin e (VITAMIN E) 400 UNIT CAPS Take 400 Units by mouth every day.       • Omega-3 Fatty Acids (FISH OIL) 1000 MG CPDR Take 1,200 mg by mouth every day.       No current facility-administered medications on file prior to visit.       Allergies, past medical history, past surgical  "history, family history, social history reviewed and updated    ROS:  All other systems reviewed and are negative except as stated in the HPI       Physical Exam:     /80 (BP Location: Left arm, Patient Position: Sitting, BP Cuff Size: Adult)   Pulse 69   Temp 36.3 °C (97.3 °F) (Temporal)   Ht 1.625 m (5' 3.98\")   Wt 78 kg (171 lb 14.4 oz)   SpO2 96%   BMI 29.53 kg/m²   General: Normal appearing. No distress.  ENT: oropharynx without exudates.    Eyes: conjunctiva clear lids without ptosis  Pulmonary: Clear to ausculation.  Normal effort.   Cardiovascular: Regular rate and rhythm  Abdomen: Soft, nontender,  Lymph: No cervical or supraclavicular palpable lymph nodes  Psych: Normal mood and affect.     I have reviewed pertinent labs and diagnostic tests associated with this visit with specific comments listed under the assessment and plan below      Assessment and Plan:     75 y.o. female with the following issues.    1. Chronic pain of left knee  - DX-KNEE COMPLETE 4+ LEFT; Future  -Patient refused physical therapy, also mention she applied Voltaren gel without too much help, she has orthopedic specialist at [facility] who are seen for her finger triggers as well    3. Tinnitus of both ears  Patient refused audiometry, bilateral ear exam with normal range, patient would like to try some supplements for that    Follow Up:      Return in about 6 months (around 3/20/2022).  Current condition  Please note that this dictation was created using voice recognition software. I have made every reasonable attempt to correct obvious errors, but I expect that there are errors of grammar and possibly content that I did not discover before finalizing the note.    Signed by: Romy Stanton M.D.    "

## 2021-09-28 ENCOUNTER — TELEPHONE (OUTPATIENT)
Dept: MEDICAL GROUP | Facility: PHYSICIAN GROUP | Age: 76
End: 2021-09-28

## 2021-09-28 DIAGNOSIS — G89.29 CHRONIC PAIN OF LEFT KNEE: ICD-10-CM

## 2021-09-28 DIAGNOSIS — M25.562 CHRONIC PAIN OF LEFT KNEE: ICD-10-CM

## 2021-09-28 DIAGNOSIS — G89.29 CHRONIC PAIN OF RIGHT KNEE: ICD-10-CM

## 2021-09-28 DIAGNOSIS — M25.561 CHRONIC PAIN OF RIGHT KNEE: ICD-10-CM

## 2021-09-28 NOTE — TELEPHONE ENCOUNTER
Phone Number Called: [phone]    Call outcome: Spoke to [employer] representative    Message: Call placed to [facility] to discuss how patient goes about getting her laser services by chiropractor covered. Per Tavo with [employer] she needs to have her chiropractor submit the claim and then go from there. When submitted via fax they need to go to [employer] -> providers -> second box (important forms) -> medical prior auth -> section 3 should say chiropractic, medical office, number of services, cpt codes, and medical notes. They can fax this to [phone].  On tab 5 they need the bill charges (the number of times these services will be completed).

## 2021-10-04 DIAGNOSIS — G89.29 CHRONIC PAIN OF RIGHT KNEE: ICD-10-CM

## 2021-10-04 DIAGNOSIS — M25.561 CHRONIC PAIN OF RIGHT KNEE: ICD-10-CM

## 2021-10-04 DIAGNOSIS — M25.562 CHRONIC PAIN OF LEFT KNEE: ICD-10-CM

## 2021-10-04 DIAGNOSIS — G89.29 CHRONIC PAIN OF LEFT KNEE: ICD-10-CM

## 2021-10-04 DIAGNOSIS — M65.311 TRIGGER THUMB OF RIGHT HAND: ICD-10-CM

## 2021-10-04 RX ORDER — GABAPENTIN 100 MG/1
100 CAPSULE ORAL 3 TIMES DAILY
Qty: 90 CAPSULE | Refills: 3 | Status: SHIPPED | OUTPATIENT
Start: 2021-10-04 | End: 2022-03-23

## 2021-10-20 ENCOUNTER — HOSPITAL ENCOUNTER (OUTPATIENT)
Facility: MEDICAL CENTER | Age: 76
End: 2021-10-20
Attending: ANESTHESIOLOGY
Payer: MEDICARE

## 2021-10-20 LAB — COVID ORDER STATUS COVID19: NORMAL

## 2021-10-20 PROCEDURE — U0003 INFECTIOUS AGENT DETECTION BY NUCLEIC ACID (DNA OR RNA); SEVERE ACUTE RESPIRATORY SYNDROME CORONAVIRUS 2 (SARS-COV-2) (CORONAVIRUS DISEASE [COVID-19]), AMPLIFIED PROBE TECHNIQUE, MAKING USE OF HIGH THROUGHPUT TECHNOLOGIES AS DESCRIBED BY CMS-2020-01-R: HCPCS

## 2021-10-20 PROCEDURE — U0005 INFEC AGEN DETEC AMPLI PROBE: HCPCS

## 2021-10-21 LAB
SARS-COV-2 RNA RESP QL NAA+PROBE: NOTDETECTED
SPECIMEN SOURCE: NORMAL

## 2021-10-26 PROBLEM — M79.643 HAND PAIN: Status: ACTIVE | Noted: 2021-10-26

## 2021-11-12 DIAGNOSIS — E03.9 HYPOTHYROIDISM, UNSPECIFIED TYPE: ICD-10-CM

## 2021-11-15 RX ORDER — LEVOTHYROXINE SODIUM 0.1 MG/1
TABLET ORAL
Qty: 100 TABLET | Refills: 1 | Status: SHIPPED | OUTPATIENT
Start: 2021-11-15 | End: 2022-04-01

## 2021-12-16 ENCOUNTER — TELEPHONE (OUTPATIENT)
Dept: MEDICAL GROUP | Facility: PHYSICIAN GROUP | Age: 76
End: 2021-12-16

## 2021-12-16 ENCOUNTER — TELEPHONE (OUTPATIENT)
Dept: HEALTH INFORMATION MANAGEMENT | Facility: OTHER | Age: 76
End: 2021-12-16

## 2021-12-16 DIAGNOSIS — Z12.12 SCREENING FOR COLORECTAL CANCER: ICD-10-CM

## 2021-12-16 DIAGNOSIS — Z12.11 SCREENING FOR COLORECTAL CANCER: ICD-10-CM

## 2021-12-16 NOTE — TELEPHONE ENCOUNTER
Phone Number Called: .[phone] (home) [phone] (work)      Call outcome: Spoke to patient regarding message below.    Message: Pt overdue for colorectal screening. Pt declines colonoscopy but is ok with FIT test. Please place order for Fit and I will mail kit to Pt. Thank you.

## 2021-12-18 ENCOUNTER — HOSPITAL ENCOUNTER (OUTPATIENT)
Facility: MEDICAL CENTER | Age: 76
End: 2021-12-18
Attending: INTERNAL MEDICINE
Payer: MEDICARE

## 2021-12-18 PROCEDURE — 82274 ASSAY TEST FOR BLOOD FECAL: CPT

## 2021-12-21 DIAGNOSIS — Z12.11 SCREENING FOR COLORECTAL CANCER: ICD-10-CM

## 2021-12-21 DIAGNOSIS — Z12.12 SCREENING FOR COLORECTAL CANCER: ICD-10-CM

## 2021-12-23 PROBLEM — H40.003 PREGLAUCOMA OF BOTH EYES: Status: ACTIVE | Noted: 2021-12-23

## 2021-12-23 LAB — IMM ASSAY OCC BLD FITOB: NEGATIVE

## 2022-02-02 ENCOUNTER — HOSPITAL ENCOUNTER (OUTPATIENT)
Facility: MEDICAL CENTER | Age: 77
End: 2022-02-02
Attending: ANESTHESIOLOGY
Payer: MEDICARE

## 2022-02-02 PROBLEM — M17.12 OSTEOARTHRITIS OF LEFT KNEE: Status: ACTIVE | Noted: 2022-02-02

## 2022-02-02 LAB — COVID ORDER STATUS COVID19: NORMAL

## 2022-02-02 PROCEDURE — U0005 INFEC AGEN DETEC AMPLI PROBE: HCPCS

## 2022-02-02 PROCEDURE — U0003 INFECTIOUS AGENT DETECTION BY NUCLEIC ACID (DNA OR RNA); SEVERE ACUTE RESPIRATORY SYNDROME CORONAVIRUS 2 (SARS-COV-2) (CORONAVIRUS DISEASE [COVID-19]), AMPLIFIED PROBE TECHNIQUE, MAKING USE OF HIGH THROUGHPUT TECHNOLOGIES AS DESCRIBED BY CMS-2020-01-R: HCPCS

## 2022-02-03 LAB
SARS-COV-2 RNA RESP QL NAA+PROBE: NOTDETECTED
SPECIMEN SOURCE: NORMAL

## 2022-02-09 ENCOUNTER — HOSPITAL ENCOUNTER (OUTPATIENT)
Dept: RADIOLOGY | Facility: MEDICAL CENTER | Age: 77
End: 2022-02-09
Attending: INTERNAL MEDICINE
Payer: MEDICARE

## 2022-02-09 DIAGNOSIS — Z12.31 VISIT FOR SCREENING MAMMOGRAM: ICD-10-CM

## 2022-02-09 PROCEDURE — 77063 BREAST TOMOSYNTHESIS BI: CPT

## 2022-02-11 DIAGNOSIS — E78.5 DYSLIPIDEMIA: ICD-10-CM

## 2022-02-14 RX ORDER — ATORVASTATIN CALCIUM 20 MG/1
TABLET, FILM COATED ORAL
Qty: 100 TABLET | Refills: 0 | Status: SHIPPED | OUTPATIENT
Start: 2022-02-14 | End: 2022-04-01

## 2022-03-18 ENCOUNTER — TELEPHONE (OUTPATIENT)
Dept: MEDICAL GROUP | Facility: PHYSICIAN GROUP | Age: 77
End: 2022-03-18
Payer: MEDICARE

## 2022-03-18 NOTE — TELEPHONE ENCOUNTER
ESTABLISHED PATIENT PRE-VISIT PLANNING     Patient was NOT contacted to complete PVP.     Note: Patient will not be contacted if there is no indication to call.     1.  Reviewed notes from the last few office visits within the medical group: Yes    2.  If any orders were placed at last visit or intended to be done for this visit (i.e. 6 mos follow-up), do we have Results/Consult Notes?         •  Labs - Labs were not ordered at last office visit.  Note: If patient appointment is for lab review and patient did not complete labs, check with provider if OK to reschedule patient until labs completed.       •  Imaging - Imaging was not ordered at last office visit.       •  Referrals - No referrals were ordered at last office visit.    3. Is this appointment scheduled as a Hospital Follow-Up? No    4.  Immunizations were updated in Epic using Reconcile Outside Information activity? Yes    5.  Patient is due for the following Health Maintenance Topics:   Health Maintenance Due   Topic Date Due   • COVID-19 Vaccine (1) Never done   • IMM DTaP/Tdap/Td Vaccine (1 - Tdap) Never done   • BONE DENSITY  11/13/2020       6.  AHA (Pulse8) form printed for Provider? Yes

## 2022-03-23 ENCOUNTER — OFFICE VISIT (OUTPATIENT)
Dept: MEDICAL GROUP | Facility: PHYSICIAN GROUP | Age: 77
End: 2022-03-23
Payer: MEDICARE

## 2022-03-23 VITALS
OXYGEN SATURATION: 96 % | TEMPERATURE: 98.6 F | SYSTOLIC BLOOD PRESSURE: 102 MMHG | BODY MASS INDEX: 28.82 KG/M2 | WEIGHT: 173 LBS | HEART RATE: 79 BPM | DIASTOLIC BLOOD PRESSURE: 66 MMHG | HEIGHT: 65 IN

## 2022-03-23 DIAGNOSIS — I10 ESSENTIAL HYPERTENSION: ICD-10-CM

## 2022-03-23 DIAGNOSIS — K14.6 BURNING MOUTH SYNDROME: ICD-10-CM

## 2022-03-23 DIAGNOSIS — M17.12 PRIMARY OSTEOARTHRITIS OF LEFT KNEE: ICD-10-CM

## 2022-03-23 PROBLEM — E66.9 OBESITY (BMI 30.0-34.9): Status: RESOLVED | Noted: 2020-06-23 | Resolved: 2022-03-23

## 2022-03-23 PROBLEM — E66.811 OBESITY (BMI 30.0-34.9): Status: RESOLVED | Noted: 2020-06-23 | Resolved: 2022-03-23

## 2022-03-23 PROCEDURE — 99214 OFFICE O/P EST MOD 30 MIN: CPT | Performed by: INTERNAL MEDICINE

## 2022-03-23 RX ORDER — LIDOCAINE HYDROCHLORIDE 20 MG/ML
10 SOLUTION OROPHARYNGEAL PRN
Qty: 100 ML | Refills: 6 | Status: SHIPPED | OUTPATIENT
Start: 2022-03-23 | End: 2022-03-24

## 2022-03-23 ASSESSMENT — PATIENT HEALTH QUESTIONNAIRE - PHQ9: CLINICAL INTERPRETATION OF PHQ2 SCORE: 0

## 2022-03-23 NOTE — PROGRESS NOTES
Established Patient    Chief Complaint   Patient presents with   • Follow-Up       Subjective:     HPI:   Kaylee presents today with the following.    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 30 Tablet 0   • atorvastatin (LIPITOR) 20 MG Tab Take 1 tablet by mouth once daily 100 Tablet 0   • VITAMIN D PO Take  by mouth.     • MAGNESIUM PO Take  by mouth.     • timolol (TIMOPTIC) 0.5 % Solution Administer 1 Drop into both eyes every morning.     • levothyroxine (SYNTHROID) 100 MCG Tab TAKE 1 TABLET BY MOUTH ONCE DAILY IN THE MORNING ON AN EMPTY STOMACH 100 Tablet 1   • NON SPECIFIED Super Colon Cleanse 2 caps     • Spirulina Powder      • amLODIPine (NORVASC) 5 MG Tab Take 1 tablet by mouth once daily 100 tablet 1   • Cholecalciferol (VITAMIN D3) 5000 units Cap Take 1 Cap by mouth every day.     • latanoprost (XALATAN) 0.005 % Solution Place 1 Drop in both eyes every evening.     • aspirin 81 MG tablet Take 81 mg by mouth every day.     • vitamin e (VITAMIN E) 400 UNIT CAPS Take 400 Units by mouth every day.       • Omega-3 Fatty Acids (FISH OIL) 1000 MG CPDR Take 1,200 mg by mouth every day.         No current facility-administered medications on file prior to visit.       Allergies, past medical history, past surgical history, family history, social history  "reviewed and updated    ROS:  All other systems reviewed and are negative except as stated in the HPI       Physical Exam:     /66 (BP Location: Left arm, Patient Position: Sitting, BP Cuff Size: Large adult)   Pulse 79   Temp 37 °C (98.6 °F) (Temporal)   Ht 1.651 m (5' 5\")   Wt 78.5 kg (173 lb)   SpO2 96%   BMI 28.79 kg/m²   General: Normal appearing. No distress.  Pulmonary: Clear to ausculation.  Normal effort.   Cardiovascular: Regular rate and rhythm    Assessment and Plan:     76 y.o. female with the following issues.    1. Burning mouth syndrome  Reported this is going on for possibly 20 years, started after moving from [place] to [place], associated with some periodic blistering of the mouth with burning, reported had multiple biopsy without definitive diagnosis, reported well controlled with lidocaine viscus solution as needed, she would like to have refill  - lidocaine (XYLOCAINE) 2 % Solution; Take 10 mL by mouth as needed for Throat/Mouth Pain.  Dispense: 100 mL; Refill: 6    2. Essential hypertension  Stable, she is compliant with amlodipine 5 mg daily, denied having related symptoms    3. Primary osteoarthritis of left knee  She is currently seen by orthopedic, plan for total knee replacement in April 2022, currently she is taking meloxicam 7.5 mg daily which is not really helpful, she tried other conservative management including Voltaren gel and heating pad.  Reported she has some old oxycodone prescription, she took it and really was tired and sleepy.  She stopped taking oxycodone    Follow Up:      Return in about 6 months (around 9/23/2022).    Please note that this dictation was created using voice recognition software. I have made every reasonable attempt to correct obvious errors, but I expect that there are errors of grammar and possibly content that I did not discover before finalizing the note.    Signed by: Romy Stanton M.D.    "

## 2022-03-24 DIAGNOSIS — K14.6 BURNING MOUTH SYNDROME: ICD-10-CM

## 2022-03-24 RX ORDER — LIDOCAINE HYDROCHLORIDE 20 MG/ML
10 SOLUTION OROPHARYNGEAL PRN
Qty: 100 ML | Refills: 6 | Status: SHIPPED | OUTPATIENT
Start: 2022-03-24 | End: 2022-03-24 | Stop reason: SDUPTHER

## 2022-03-24 RX ORDER — LIDOCAINE HYDROCHLORIDE 20 MG/ML
10 SOLUTION OROPHARYNGEAL PRN
Qty: 100 ML | Refills: 6 | Status: SHIPPED | OUTPATIENT
Start: 2022-03-24 | End: 2022-10-03

## 2022-03-29 ENCOUNTER — HOSPITAL ENCOUNTER (OUTPATIENT)
Facility: MEDICAL CENTER | Age: 77
End: 2022-03-29
Attending: ANESTHESIOLOGY
Payer: MEDICARE

## 2022-03-29 LAB
COVID ORDER STATUS COVID19: NORMAL
SARS-COV-2 RNA RESP QL NAA+PROBE: NOTDETECTED
SPECIMEN SOURCE: NORMAL

## 2022-03-29 PROCEDURE — U0005 INFEC AGEN DETEC AMPLI PROBE: HCPCS

## 2022-03-29 PROCEDURE — U0003 INFECTIOUS AGENT DETECTION BY NUCLEIC ACID (DNA OR RNA); SEVERE ACUTE RESPIRATORY SYNDROME CORONAVIRUS 2 (SARS-COV-2) (CORONAVIRUS DISEASE [COVID-19]), AMPLIFIED PROBE TECHNIQUE, MAKING USE OF HIGH THROUGHPUT TECHNOLOGIES AS DESCRIBED BY CMS-2020-01-R: HCPCS

## 2022-04-01 ENCOUNTER — OFFICE VISIT (OUTPATIENT)
Dept: URGENT CARE | Facility: PHYSICIAN GROUP | Age: 77
End: 2022-04-01
Payer: MEDICARE

## 2022-04-01 VITALS
SYSTOLIC BLOOD PRESSURE: 162 MMHG | DIASTOLIC BLOOD PRESSURE: 88 MMHG | HEART RATE: 86 BPM | RESPIRATION RATE: 16 BRPM | OXYGEN SATURATION: 98 % | TEMPERATURE: 98 F

## 2022-04-01 DIAGNOSIS — M25.562 ACUTE PAIN OF LEFT KNEE: ICD-10-CM

## 2022-04-01 DIAGNOSIS — M17.12 PRIMARY OSTEOARTHRITIS OF LEFT KNEE: ICD-10-CM

## 2022-04-01 DIAGNOSIS — R03.0 ELEVATED BLOOD PRESSURE READING: ICD-10-CM

## 2022-04-01 PROCEDURE — 99214 OFFICE O/P EST MOD 30 MIN: CPT | Performed by: FAMILY MEDICINE

## 2022-04-01 NOTE — PROGRESS NOTES
Subjective:      76 y.o. female presents to urgent care for left knee pain.  She states she has had left knee pain for the last 4 years and is scheduled to get a total knee replacement 4/4/2022.  Unfortunately she was told she cannot take any pain medications for 10 days prior to the surgery so her pain has worsened since that time.  Pain is located in her entire left knee, it is constant, described as throbbing, currently rated 10/10.  For the last 10 days she has only been using Hempvana which has not helping with her pain.    Blood pressure is elevated today in urgent care.  She does have a history of hypertension for which she takes amlodipine.  She is extremely frustrated and in a lot of pain waiting to get her knee replaced on Monday.  She denies any chest pain, palpitations, shortness of breath.    She denies any other questions or concerns at this time.    Current problem list, medication, and past medical/surgical history were reviewed in Epic.    ROS  See HPI     Objective:      BP (!) 162/88 (BP Location: Right arm, Patient Position: Sitting, BP Cuff Size: Adult)   Pulse 86   Temp 36.7 °C (98 °F) (Temporal)   Resp 16   SpO2 98%     Physical Exam  Constitutional:       General: She is not in acute distress.     Appearance: She is not diaphoretic.   Cardiovascular:      Rate and Rhythm: Normal rate and regular rhythm.      Heart sounds: Normal heart sounds.   Pulmonary:      Effort: Pulmonary effort is normal. No respiratory distress.      Breath sounds: Normal breath sounds.   Musculoskeletal:      Left lower leg: Edema present.      Comments: Left knee is tender to palpation, she is unable to straighten it entirely.  Unable to weight-bear due to pain.   Neurological:      Mental Status: She is alert.   Psychiatric:         Mood and Affect: Affect normal.         Judgment: Judgment normal.       Assessment/Plan:     1. Primary osteoarthritis of left knee  2. Acute pain of left knee  Patient was  encouraged to use 1 g of Tylenol 4 times daily as well as ice, heat, and topical medication such as Aspercreme for symptomatic relief.  She has planned for surgery on Monday.  She has also already called her orthopedic surgeon's office to let them know about her pain.    3. Elevated blood pressure reading  Asymptomatic.  Referral back to PCP has been placed.  - Referral back to [facility]      Instructed to return to Urgent Care or nearest Emergency Department if symptoms fail to improve, for any change in condition, further concerns, or new concerning symptoms. Patient states understanding of the plan of care and discharge instructions.    Amy Guerrero M.D.

## 2022-04-01 NOTE — PATIENT INSTRUCTIONS
-Please take 1 Gram of Tylenol 4 times daily (cannot exceed 4G in a 24 hours period)  -Aspercreme and Voltaren topically  -Raise your leg to heart level

## 2022-04-20 DIAGNOSIS — I10 ESSENTIAL HYPERTENSION: ICD-10-CM

## 2022-04-20 RX ORDER — AMLODIPINE BESYLATE 10 MG/1
10 TABLET ORAL DAILY
Qty: 60 TABLET | Refills: 3 | Status: SHIPPED | OUTPATIENT
Start: 2022-04-20 | End: 2022-11-07 | Stop reason: SDUPTHER

## 2022-05-12 ENCOUNTER — PATIENT MESSAGE (OUTPATIENT)
Dept: MEDICAL GROUP | Facility: PHYSICIAN GROUP | Age: 77
End: 2022-05-12
Payer: MEDICARE

## 2022-05-12 DIAGNOSIS — E78.5 DYSLIPIDEMIA: ICD-10-CM

## 2022-05-12 DIAGNOSIS — E03.9 HYPOTHYROIDISM, UNSPECIFIED TYPE: ICD-10-CM

## 2022-05-12 RX ORDER — ATORVASTATIN CALCIUM 20 MG/1
20 TABLET, FILM COATED ORAL NIGHTLY
Qty: 100 TABLET | Refills: 1 | Status: SHIPPED | OUTPATIENT
Start: 2022-05-12 | End: 2022-09-15 | Stop reason: SDUPTHER

## 2022-05-12 RX ORDER — LEVOTHYROXINE SODIUM 0.1 MG/1
100 TABLET ORAL
Qty: 100 TABLET | Refills: 1 | Status: SHIPPED | OUTPATIENT
Start: 2022-05-12 | End: 2022-08-29 | Stop reason: SDUPTHER

## 2022-05-12 NOTE — PATIENT COMMUNICATION
Received request via: Patient    Was the patient seen in the last year in this department? Yes  Last OV 3/23/22    Does the patient have an active prescription (recently filled or refills available) for medication(s) requested? No    Requested Prescriptions     Pending Prescriptions Disp Refills   • levothyroxine (SYNTHROID) 100 MCG Tab 100 Tablet 1     Sig: Take 1 Tablet by mouth every morning on an empty stomach.   • atorvastatin (LIPITOR) 20 MG Tab 100 Tablet 1     Sig: Take 1 Tablet by mouth every evening.

## 2022-05-13 ENCOUNTER — PATIENT MESSAGE (OUTPATIENT)
Dept: MEDICAL GROUP | Facility: PHYSICIAN GROUP | Age: 77
End: 2022-05-13
Payer: MEDICARE

## 2022-05-13 DIAGNOSIS — M25.562 CHRONIC PAIN OF LEFT KNEE: ICD-10-CM

## 2022-05-13 DIAGNOSIS — M65.311 TRIGGER THUMB OF RIGHT HAND: ICD-10-CM

## 2022-05-13 DIAGNOSIS — M25.561 CHRONIC PAIN OF RIGHT KNEE: ICD-10-CM

## 2022-05-13 DIAGNOSIS — G89.29 CHRONIC PAIN OF RIGHT KNEE: ICD-10-CM

## 2022-05-13 DIAGNOSIS — G89.29 CHRONIC PAIN OF LEFT KNEE: ICD-10-CM

## 2022-05-16 DIAGNOSIS — G89.29 CHRONIC PAIN OF RIGHT KNEE: ICD-10-CM

## 2022-05-16 DIAGNOSIS — M65.311 TRIGGER THUMB OF RIGHT HAND: ICD-10-CM

## 2022-05-16 DIAGNOSIS — M25.561 CHRONIC PAIN OF RIGHT KNEE: ICD-10-CM

## 2022-05-16 DIAGNOSIS — M25.562 CHRONIC PAIN OF LEFT KNEE: ICD-10-CM

## 2022-05-16 DIAGNOSIS — G89.29 CHRONIC PAIN OF LEFT KNEE: ICD-10-CM

## 2022-05-16 RX ORDER — GABAPENTIN 100 MG/1
100 CAPSULE ORAL 3 TIMES DAILY
Qty: 90 CAPSULE | Refills: 0 | Status: SHIPPED | OUTPATIENT
Start: 2022-05-16 | End: 2022-08-10

## 2022-06-02 PROBLEM — M24.669 ARTHROFIBROSIS OF KNEE JOINT: Status: ACTIVE | Noted: 2022-06-02

## 2022-06-28 ENCOUNTER — TELEPHONE (OUTPATIENT)
Dept: MEDICAL GROUP | Facility: PHYSICIAN GROUP | Age: 77
End: 2022-06-28
Payer: MEDICARE

## 2022-06-28 RX ORDER — LINACLOTIDE 145 UG/1
CAPSULE, GELATIN COATED ORAL
Qty: 90 CAPSULE | Refills: 0 | Status: SHIPPED | OUTPATIENT
Start: 2022-06-28 | End: 2022-06-29 | Stop reason: SDUPTHER

## 2022-06-28 NOTE — TELEPHONE ENCOUNTER
VOICEMAIL  1. Caller Name: Kaylee Nazario                        Call Back Number: [phone] (home)       2. Message:  Pt is looking to get a refill on her linzess. Her last rx was sent in back in 2019 and she states that she had three bottles since then to last her till now. She claims to use this medication sparingly.     3. Patient approves office to leave a detailed voicemail/MyChart message: yes

## 2022-06-29 RX ORDER — LINACLOTIDE 145 UG/1
CAPSULE, GELATIN COATED ORAL
Qty: 90 CAPSULE | Refills: 1 | Status: SHIPPED | OUTPATIENT
Start: 2022-06-29

## 2022-07-18 DIAGNOSIS — M79.89 LEG SWELLING: ICD-10-CM

## 2022-07-18 RX ORDER — FUROSEMIDE 20 MG/1
20 TABLET ORAL
Qty: 60 TABLET | Refills: 1 | Status: SHIPPED | OUTPATIENT
Start: 2022-07-18 | End: 2022-08-15 | Stop reason: SDUPTHER

## 2022-08-10 ENCOUNTER — OFFICE VISIT (OUTPATIENT)
Dept: MEDICAL GROUP | Facility: PHYSICIAN GROUP | Age: 77
End: 2022-08-10
Payer: MEDICARE

## 2022-08-10 VITALS
WEIGHT: 168 LBS | RESPIRATION RATE: 14 BRPM | SYSTOLIC BLOOD PRESSURE: 132 MMHG | TEMPERATURE: 98.3 F | DIASTOLIC BLOOD PRESSURE: 78 MMHG | HEIGHT: 65 IN | HEART RATE: 100 BPM | OXYGEN SATURATION: 95 % | BODY MASS INDEX: 27.99 KG/M2

## 2022-08-10 DIAGNOSIS — R26.89 BALANCE PROBLEM: ICD-10-CM

## 2022-08-10 DIAGNOSIS — R41.3 POOR SHORT TERM MEMORY: ICD-10-CM

## 2022-08-10 DIAGNOSIS — R21 RASH/SKIN ERUPTION: ICD-10-CM

## 2022-08-10 DIAGNOSIS — Z87.81 HISTORY OF FRACTURE OF LEFT ANKLE: ICD-10-CM

## 2022-08-10 DIAGNOSIS — M17.12 PRIMARY OSTEOARTHRITIS OF LEFT KNEE: ICD-10-CM

## 2022-08-10 PROCEDURE — 99214 OFFICE O/P EST MOD 30 MIN: CPT | Performed by: INTERNAL MEDICINE

## 2022-08-10 RX ORDER — MULTIVIT-MIN/IRON/FOLIC ACID/K 18-600-40
CAPSULE ORAL
COMMUNITY
End: 2022-10-03

## 2022-08-10 RX ORDER — VIT C/B6/B5/MAGNESIUM/HERB 173 50-5-6-5MG
CAPSULE ORAL
COMMUNITY
End: 2023-04-03

## 2022-08-10 RX ORDER — PREDNISONE 20 MG/1
TABLET ORAL
Qty: 12 TABLET | Refills: 0 | Status: SHIPPED | OUTPATIENT
Start: 2022-08-10 | End: 2022-10-03

## 2022-08-10 RX ORDER — SPIRONOLACTONE 25 MG/1
25 TABLET ORAL DAILY
COMMUNITY
End: 2022-08-29 | Stop reason: SDUPTHER

## 2022-08-10 RX ORDER — AMOXICILLIN 500 MG
CAPSULE ORAL
COMMUNITY

## 2022-08-10 RX ORDER — TRIAMCINOLONE ACETONIDE 1 MG/G
CREAM TOPICAL
Qty: 28.4 G | Refills: 1 | Status: SHIPPED | OUTPATIENT
Start: 2022-08-10 | End: 2022-10-03

## 2022-08-10 RX ORDER — PREDNISONE 10 MG/1
TABLET ORAL
Qty: 6 TABLET | Refills: 0 | Status: SHIPPED | OUTPATIENT
Start: 2022-08-10 | End: 2022-10-03

## 2022-08-10 NOTE — PROGRESS NOTES
Established Patient    Chief Complaint   Patient presents with    Rash     Bilateral legs, itchy        Subjective:     HPI:   Kaylee presents today with the following.    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      Turmeric 500 MG Cap Take  by mouth.      Ascorbic Acid (VITAMIN C) 500 MG Cap Take  by mouth.      spironolactone (ALDACTONE) 25 MG Tab Take 25 mg by mouth every day.      furosemide (LASIX) 20 MG Tab Take 1 Tablet by mouth 1 time a day as needed (Leg swelling). 60 Tablet 1    linaCLOtide (LINZESS) 145 MCG Cap TAKE 1 CAPSULE BY MOUTH ONCE DAILY AS NEEDED 90 Capsule 1    levothyroxine (SYNTHROID) 100 MCG Tab Take 1 Tablet by mouth every morning on an empty stomach. 100 Tablet 1    atorvastatin (LIPITOR) 20 MG Tab Take 1 Tablet by mouth every evening. 100 Tablet 1    amLODIPine (NORVASC) 10 MG Tab Take 1 Tablet by mouth every day. 60 Tablet 3    aspirin 81 MG EC tablet Take 1 Tablet by mouth 2 times a day with meals. 90 Tablet 0    MAGNESIUM PO Take  by mouth.      lidocaine (XYLOCAINE) 2 % Solution Take 10 mL by mouth as needed for Throat/Mouth Pain. swished in the mouth and spit out, three times daily prn (Patient not taking: Reported on 8/10/2022) 100 mL 6     No  "current facility-administered medications on file prior to visit.       Allergies, past medical history, past surgical history, family history, social history reviewed and updated    ROS:  All other systems reviewed and are negative except as stated in the HPI       Physical Exam:     /78 (BP Location: Left arm, Patient Position: Sitting, BP Cuff Size: Adult)   Pulse 100   Temp 36.8 °C (98.3 °F) (Temporal)   Resp 14   Ht 1.651 m (5' 5\")   Wt 76.2 kg (168 lb)   SpO2 95%   BMI 27.96 kg/m²   General: Normal appearing. No distress.  Pulmonary: Clear to ausculation.  Normal effort.   Cardiovascular: Regular rate and rhythm    Assessment and Plan:     76 y.o. female with the following issues.    1. Primary osteoarthritis of left knee  2. Balance problem  History of left ankle injury  > History ofLateral malleolus, 2015, reported some weakness of the left ankle with giving out during walking, but ultimately contributing to balance problem, she is using cane, educated regarding fall, educated regarding physical therapy as well    3. Rash/skin eruption  Bilateral legs, maculopapular rash, associated with some minor blisters, localized contact dermatitis, she was using compression socks back from the [place] which could be contributed to the skin rash from some head and allergies, educated regarding conservative management as well  - predniSONE (DELTASONE) 20 MG Tab; Take 40 mg for 3 days, then 30 mg for 3 days, then 20 mg for 3 days, then 10 mg for 3 days.  Dispense: 12 Tablet; Refill: 0  - predniSONE (DELTASONE) 10 MG Tab; Take 40 mg for 3 days, then 30 mg for 3 days, then 20 mg for 3 days, then 10 mg for 3 days.  Dispense: 6 Tablet; Refill: 0  - triamcinolone acetonide (KENALOG) 0.1 % Cream; Topical, twice a day, up to 2 weeks  Dispense: 28.4 g; Refill: 1    4. Poor short term memory  This is likely secondary to stress from left knee surgery with some complication including left leg swelling, educated regarding " some technique for memory improvement as well as stress management    > ER visit precaution explained to the patient in detail.  Patient understands    Follow Up:      Return in about 4 weeks (around 9/7/2022).  Skin rash    Please note that this dictation was created using voice recognition software. I have made every reasonable attempt to correct obvious errors, but I expect that there are errors of grammar and possibly content that I did not discover before finalizing the note.    Signed by: Romy Stanton M.D.

## 2022-08-11 ENCOUNTER — TELEPHONE (OUTPATIENT)
Dept: MEDICAL GROUP | Facility: PHYSICIAN GROUP | Age: 77
End: 2022-08-11

## 2022-08-11 NOTE — TELEPHONE ENCOUNTER
VOICEMAIL  1. Caller Name: Livan / [facility]                      Call Back Number: [phone]    2. Message: Pharmacy is calling regarding the patients prednisone rx. The quantity sent in wouldn't be enough for what was prescribed for the pt.    3. Patient approves office to leave a detailed voicemail/MyChart message: yes

## 2022-08-15 DIAGNOSIS — M79.89 LEG SWELLING: ICD-10-CM

## 2022-08-15 RX ORDER — FUROSEMIDE 20 MG/1
20 TABLET ORAL
Qty: 60 TABLET | Refills: 0 | Status: SHIPPED | OUTPATIENT
Start: 2022-08-15 | End: 2022-08-15 | Stop reason: SDUPTHER

## 2022-08-15 RX ORDER — FUROSEMIDE 20 MG/1
20 TABLET ORAL 2 TIMES DAILY
Qty: 120 TABLET | Refills: 1 | Status: SHIPPED | OUTPATIENT
Start: 2022-08-15 | End: 2022-08-29 | Stop reason: SDUPTHER

## 2022-08-29 ENCOUNTER — PATIENT MESSAGE (OUTPATIENT)
Dept: MEDICAL GROUP | Facility: PHYSICIAN GROUP | Age: 77
End: 2022-08-29
Payer: MEDICARE

## 2022-08-29 DIAGNOSIS — E03.9 HYPOTHYROIDISM, UNSPECIFIED TYPE: ICD-10-CM

## 2022-08-29 DIAGNOSIS — M79.89 LEG SWELLING: ICD-10-CM

## 2022-08-29 RX ORDER — SPIRONOLACTONE 25 MG/1
25 TABLET ORAL DAILY
Qty: 30 TABLET | Refills: 0 | Status: SHIPPED | OUTPATIENT
Start: 2022-08-29 | End: 2022-09-28

## 2022-08-29 RX ORDER — LEVOTHYROXINE SODIUM 0.1 MG/1
100 TABLET ORAL
Qty: 100 TABLET | Refills: 0 | Status: SHIPPED | OUTPATIENT
Start: 2022-08-29 | End: 2022-11-07 | Stop reason: SDUPTHER

## 2022-08-29 RX ORDER — FUROSEMIDE 20 MG/1
20 TABLET ORAL 2 TIMES DAILY
Qty: 120 TABLET | Refills: 0 | Status: SHIPPED | OUTPATIENT
Start: 2022-08-29 | End: 2022-10-03 | Stop reason: SDUPTHER

## 2022-09-15 DIAGNOSIS — E78.5 DYSLIPIDEMIA: ICD-10-CM

## 2022-09-16 RX ORDER — ATORVASTATIN CALCIUM 20 MG/1
20 TABLET, FILM COATED ORAL NIGHTLY
Qty: 100 TABLET | Refills: 0 | Status: SHIPPED | OUTPATIENT
Start: 2022-09-16 | End: 2023-03-02 | Stop reason: SDUPTHER

## 2022-09-28 RX ORDER — SPIRONOLACTONE 25 MG/1
TABLET ORAL
Qty: 30 TABLET | Refills: 0 | Status: SHIPPED | OUTPATIENT
Start: 2022-09-28 | End: 2023-04-03

## 2022-10-03 ENCOUNTER — OFFICE VISIT (OUTPATIENT)
Dept: MEDICAL GROUP | Facility: PHYSICIAN GROUP | Age: 77
End: 2022-10-03
Payer: MEDICARE

## 2022-10-03 VITALS
WEIGHT: 164 LBS | HEIGHT: 66 IN | SYSTOLIC BLOOD PRESSURE: 224 MMHG | TEMPERATURE: 98 F | DIASTOLIC BLOOD PRESSURE: 72 MMHG | HEART RATE: 78 BPM | OXYGEN SATURATION: 96 % | RESPIRATION RATE: 14 BRPM | BODY MASS INDEX: 26.36 KG/M2

## 2022-10-03 DIAGNOSIS — M79.89 LEG SWELLING: ICD-10-CM

## 2022-10-03 DIAGNOSIS — G89.29 CHRONIC PAIN OF LEFT KNEE: ICD-10-CM

## 2022-10-03 DIAGNOSIS — M25.562 CHRONIC PAIN OF LEFT KNEE: ICD-10-CM

## 2022-10-03 DIAGNOSIS — M17.12 PRIMARY OSTEOARTHRITIS OF LEFT KNEE: ICD-10-CM

## 2022-10-03 PROCEDURE — 99214 OFFICE O/P EST MOD 30 MIN: CPT | Performed by: INTERNAL MEDICINE

## 2022-10-03 RX ORDER — FUROSEMIDE 20 MG/1
20 TABLET ORAL 2 TIMES DAILY
Qty: 120 TABLET | Refills: 3 | Status: SHIPPED | OUTPATIENT
Start: 2022-10-03 | End: 2022-11-07 | Stop reason: SDUPTHER

## 2022-10-03 RX ORDER — TIMOLOL MALEATE 5 MG/ML
1 SOLUTION/ DROPS OPHTHALMIC 2 TIMES DAILY
COMMUNITY

## 2022-10-03 RX ORDER — LATANOPROST 50 UG/ML
1 SOLUTION/ DROPS OPHTHALMIC NIGHTLY
COMMUNITY

## 2022-10-03 NOTE — PROGRESS NOTES
Established Patient    Chief Complaint   Patient presents with    Follow-Up    Knee Pain       Subjective:     HPI:   Kaylee presents today with the following.    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      latanoprost (XALATAN) 0.005 % Solution Administer 1 Drop into both eyes every evening.      spironolactone (ALDACTONE) 25 MG Tab Take 1 tablet by mouth once daily 30 Tablet 0    atorvastatin (LIPITOR) 20 MG Tab Take 1 Tablet by mouth every evening. 100 Tablet 0    levothyroxine (SYNTHROID) 100 MCG Tab Take 1 Tablet by mouth every morning on an empty stomach. 100 Tablet 0    Omega-3 Fatty Acids (FISH OIL) 1200 MG Cap Take  by mouth.      Turmeric 500 MG Cap Take  by mouth.      linaCLOtide (LINZESS) 145 MCG Cap TAKE 1 CAPSULE BY MOUTH ONCE DAILY AS NEEDED 90 Capsule 1    amLODIPine (NORVASC) 10 MG Tab Take 1 Tablet by mouth every day. 60 Tablet 3    aspirin 81 MG EC tablet Take 1 Tablet by mouth 2 times a day with meals. 90 Tablet 0    MAGNESIUM PO Take  by mouth.       No current facility-administered medications on file prior to visit.       Allergies, past medical history, past surgical history, family  "history, social history reviewed and updated    ROS:  All other systems reviewed and are negative except as stated in the HPI       Physical Exam:     BP (!) 224/72 (BP Location: Left arm, Patient Position: Sitting, BP Cuff Size: Adult)   Pulse 78   Temp 36.7 °C (98 °F) (Temporal)   Resp 14   Ht 1.676 m (5' 6\")   Wt 74.4 kg (164 lb)   SpO2 96%   BMI 26.47 kg/m²   General: Normal appearing. No distress.  Pulmonary: Clear to ausculation.  Normal effort.   Cardiovascular: Regular rate and rhythm    Assessment and Plan:     76 y.o. female with the following issues.    1. Leg swelling  - furosemide (LASIX) 20 MG Tab; Take 1 Tablet by mouth 2 times a day for 60 days.  Dispense: 120 Tablet; Refill: 3    2. Chronic pain of left knee    3. Primary osteoarthritis of left knee    Reported continue complaining of the left knee after replacement of the left knee joint in April 2022, denies any alarm signs for infection, advised to continue follow-up with orthopedic, also educated regarding conservative management as well    Patient also would like to refill her Lasix for leg swelling due to the above, educated regarding side effect as well    Follow Up:       Please note that this dictation was created using voice recognition software. I have made every reasonable attempt to correct obvious errors, but I expect that there are errors of grammar and possibly content that I did not discover before finalizing the note.    Signed by: Romy Stanton M.D.      "

## 2022-10-03 NOTE — PATIENT INSTRUCTIONS
- Boswellia 300 to 400 mg 3 times daily (standardized to 37.5 to 65% Boswellic acids)    TENDLITE Red Light device , [facility]

## 2022-10-28 NOTE — TELEPHONE ENCOUNTER
1. Caller Name: Kaylee                                           Call Back Number: [phone] (home)         Patient approves a detailed voicemail message: N\A    Called and talked with pt, she had a colonoscopy done at [facility]. I will request those records. Also discussed her AWV and she is driving right now and will call back to schedule appointment.   
edentulous bottom/upper dentures

## 2022-11-07 ENCOUNTER — PATIENT MESSAGE (OUTPATIENT)
Dept: MEDICAL GROUP | Facility: PHYSICIAN GROUP | Age: 77
End: 2022-11-07
Payer: MEDICARE

## 2022-11-07 DIAGNOSIS — M79.89 LEG SWELLING: ICD-10-CM

## 2022-11-07 DIAGNOSIS — I10 ESSENTIAL HYPERTENSION: ICD-10-CM

## 2022-11-07 DIAGNOSIS — E03.9 HYPOTHYROIDISM, UNSPECIFIED TYPE: ICD-10-CM

## 2022-11-08 RX ORDER — AMLODIPINE BESYLATE 10 MG/1
10 TABLET ORAL DAILY
Qty: 60 TABLET | Refills: 0 | Status: SHIPPED | OUTPATIENT
Start: 2022-11-08 | End: 2023-03-02 | Stop reason: SDUPTHER

## 2022-11-08 RX ORDER — LEVOTHYROXINE SODIUM 0.1 MG/1
100 TABLET ORAL
Qty: 100 TABLET | Refills: 0 | Status: SHIPPED | OUTPATIENT
Start: 2022-11-08 | End: 2023-03-02 | Stop reason: SDUPTHER

## 2022-11-08 RX ORDER — FUROSEMIDE 20 MG/1
20 TABLET ORAL 2 TIMES DAILY
Qty: 120 TABLET | Refills: 0 | Status: SHIPPED | OUTPATIENT
Start: 2022-11-08 | End: 2022-12-07 | Stop reason: SDUPTHER

## 2022-12-07 ENCOUNTER — PATIENT MESSAGE (OUTPATIENT)
Dept: MEDICAL GROUP | Facility: PHYSICIAN GROUP | Age: 77
End: 2022-12-07
Payer: MEDICARE

## 2022-12-07 DIAGNOSIS — M79.89 LEG SWELLING: ICD-10-CM

## 2022-12-07 RX ORDER — FUROSEMIDE 20 MG/1
20 TABLET ORAL 2 TIMES DAILY
Qty: 120 TABLET | Refills: 0 | Status: SHIPPED | OUTPATIENT
Start: 2022-12-07 | End: 2023-02-05

## 2022-12-07 NOTE — PATIENT COMMUNICATION
Received request via: Patient    Was the patient seen in the last year in this department? Yes    Does the patient have an active prescription (recently filled or refills available) for medication(s) requested? No    Does the patient have detention Plus and need 100 day supply (blood pressure, diabetes and cholesterol meds only)? Yes, quantity updated to 100 days   
no specific change

## 2023-01-11 ENCOUNTER — TELEPHONE (OUTPATIENT)
Dept: HEALTH INFORMATION MANAGEMENT | Facility: OTHER | Age: 78
End: 2023-01-11
Payer: MEDICARE

## 2023-01-26 DIAGNOSIS — R73.03 PREDIABETES: ICD-10-CM

## 2023-01-26 DIAGNOSIS — I10 PRIMARY HYPERTENSION: ICD-10-CM

## 2023-01-26 DIAGNOSIS — E03.9 HYPOTHYROIDISM, UNSPECIFIED TYPE: ICD-10-CM

## 2023-01-26 DIAGNOSIS — E78.5 DYSLIPIDEMIA: ICD-10-CM

## 2023-01-26 DIAGNOSIS — E55.9 VITAMIN D DEFICIENCY: ICD-10-CM

## 2023-02-13 ENCOUNTER — HOSPITAL ENCOUNTER (OUTPATIENT)
Dept: RADIOLOGY | Facility: MEDICAL CENTER | Age: 78
End: 2023-02-13
Attending: INTERNAL MEDICINE
Payer: MEDICARE

## 2023-02-13 DIAGNOSIS — Z12.31 VISIT FOR SCREENING MAMMOGRAM: ICD-10-CM

## 2023-02-13 PROCEDURE — 77063 BREAST TOMOSYNTHESIS BI: CPT

## 2023-02-16 DIAGNOSIS — K14.6 BURNING MOUTH SYNDROME: ICD-10-CM

## 2023-02-17 DIAGNOSIS — K14.6 BURNING MOUTH SYNDROME: ICD-10-CM

## 2023-02-17 RX ORDER — LIDOCAINE HYDROCHLORIDE 20 MG/ML
10 SOLUTION OROPHARYNGEAL PRN
Qty: 100 ML | Refills: 6 | Status: SHIPPED | OUTPATIENT
Start: 2023-02-17 | End: 2023-02-21

## 2023-02-21 DIAGNOSIS — K14.6 BURNING MOUTH SYNDROME: ICD-10-CM

## 2023-02-21 RX ORDER — LIDOCAINE HYDROCHLORIDE 20 MG/ML
10 SOLUTION OROPHARYNGEAL PRN
Qty: 100 ML | Refills: 6 | Status: SHIPPED | OUTPATIENT
Start: 2023-02-21 | End: 2023-02-21 | Stop reason: SDUPTHER

## 2023-02-21 RX ORDER — LIDOCAINE HYDROCHLORIDE 20 MG/ML
10 SOLUTION OROPHARYNGEAL PRN
Qty: 100 ML | Refills: 6 | Status: SHIPPED | OUTPATIENT
Start: 2023-02-21

## 2023-02-21 RX ORDER — LIDOCAINE HYDROCHLORIDE 20 MG/ML
10 SOLUTION OROPHARYNGEAL PRN
Qty: 100 ML | Refills: 6 | Status: SHIPPED | OUTPATIENT
Start: 2023-02-21 | End: 2023-02-21

## 2023-03-02 DIAGNOSIS — E78.5 DYSLIPIDEMIA: ICD-10-CM

## 2023-03-02 DIAGNOSIS — I10 ESSENTIAL HYPERTENSION: ICD-10-CM

## 2023-03-02 DIAGNOSIS — E03.9 HYPOTHYROIDISM, UNSPECIFIED TYPE: ICD-10-CM

## 2023-03-02 NOTE — TELEPHONE ENCOUNTER
Received request via: Pharmacy    Was the patient seen in the last year in this department? Yes    Does the patient have an active prescription (recently filled or refills available) for medication(s) requested? No    Does the patient have prison Plus and need 100 day supply (blood pressure, diabetes and cholesterol meds only)? Medication is not for cholesterol, blood pressure or diabetes

## 2023-03-02 NOTE — TELEPHONE ENCOUNTER
Received request via: Pharmacy    Was the patient seen in the last year in this department? Yes    Does the patient have an active prescription (recently filled or refills available) for medication(s) requested? No    Does the patient have long-term Plus and need 100 day supply (blood pressure, diabetes and cholesterol meds only)? Patient does have SCP

## 2023-03-06 RX ORDER — ATORVASTATIN CALCIUM 20 MG/1
20 TABLET, FILM COATED ORAL NIGHTLY
Qty: 100 TABLET | Refills: 0 | Status: SHIPPED | OUTPATIENT
Start: 2023-03-06 | End: 2023-09-08 | Stop reason: SDUPTHER

## 2023-03-06 RX ORDER — LEVOTHYROXINE SODIUM 0.1 MG/1
100 TABLET ORAL
Qty: 100 TABLET | Refills: 0 | Status: SHIPPED | OUTPATIENT
Start: 2023-03-06 | End: 2023-05-17

## 2023-03-06 RX ORDER — AMLODIPINE BESYLATE 10 MG/1
10 TABLET ORAL DAILY
Qty: 60 TABLET | Refills: 0 | Status: SHIPPED | OUTPATIENT
Start: 2023-03-06 | End: 2023-05-17 | Stop reason: SDUPTHER

## 2023-03-27 ENCOUNTER — HOSPITAL ENCOUNTER (OUTPATIENT)
Dept: LAB | Facility: MEDICAL CENTER | Age: 78
End: 2023-03-27
Attending: INTERNAL MEDICINE
Payer: MEDICARE

## 2023-03-27 DIAGNOSIS — E78.5 DYSLIPIDEMIA: ICD-10-CM

## 2023-03-27 DIAGNOSIS — E03.9 HYPOTHYROIDISM, UNSPECIFIED TYPE: ICD-10-CM

## 2023-03-27 DIAGNOSIS — R73.03 PREDIABETES: ICD-10-CM

## 2023-03-27 DIAGNOSIS — E55.9 VITAMIN D DEFICIENCY: ICD-10-CM

## 2023-03-27 DIAGNOSIS — I10 PRIMARY HYPERTENSION: ICD-10-CM

## 2023-03-27 LAB
25(OH)D3 SERPL-MCNC: 61 NG/ML (ref 30–100)
ALBUMIN SERPL BCP-MCNC: 4.4 G/DL (ref 3.2–4.9)
ALBUMIN/GLOB SERPL: 1.6 G/DL
ALP SERPL-CCNC: 110 U/L (ref 30–99)
ALT SERPL-CCNC: 28 U/L (ref 2–50)
ANION GAP SERPL CALC-SCNC: 11 MMOL/L (ref 7–16)
AST SERPL-CCNC: 26 U/L (ref 12–45)
BILIRUB SERPL-MCNC: 0.7 MG/DL (ref 0.1–1.5)
BUN SERPL-MCNC: 14 MG/DL (ref 8–22)
CALCIUM ALBUM COR SERPL-MCNC: 9.1 MG/DL (ref 8.5–10.5)
CALCIUM SERPL-MCNC: 9.4 MG/DL (ref 8.5–10.5)
CHLORIDE SERPL-SCNC: 104 MMOL/L (ref 96–112)
CHOLEST SERPL-MCNC: 194 MG/DL (ref 100–199)
CO2 SERPL-SCNC: 26 MMOL/L (ref 20–33)
CREAT SERPL-MCNC: 0.76 MG/DL (ref 0.5–1.4)
EST. AVERAGE GLUCOSE BLD GHB EST-MCNC: 148 MG/DL
FASTING STATUS PATIENT QL REPORTED: NORMAL
GFR SERPLBLD CREATININE-BSD FMLA CKD-EPI: 80 ML/MIN/1.73 M 2
GLOBULIN SER CALC-MCNC: 2.7 G/DL (ref 1.9–3.5)
GLUCOSE SERPL-MCNC: 100 MG/DL (ref 65–99)
HBA1C MFR BLD: 6.8 % (ref 4–5.6)
HDLC SERPL-MCNC: 63 MG/DL
LDLC SERPL CALC-MCNC: 100 MG/DL
POTASSIUM SERPL-SCNC: 4.3 MMOL/L (ref 3.6–5.5)
PROT SERPL-MCNC: 7.1 G/DL (ref 6–8.2)
SODIUM SERPL-SCNC: 141 MMOL/L (ref 135–145)
T3FREE SERPL-MCNC: 2.5 PG/ML (ref 2–4.4)
T4 FREE SERPL-MCNC: 1.36 NG/DL (ref 0.93–1.7)
TRIGL SERPL-MCNC: 153 MG/DL (ref 0–149)
TSH SERPL DL<=0.005 MIU/L-ACNC: 6 UIU/ML (ref 0.38–5.33)

## 2023-03-27 PROCEDURE — 80061 LIPID PANEL: CPT

## 2023-03-27 PROCEDURE — 84443 ASSAY THYROID STIM HORMONE: CPT

## 2023-03-27 PROCEDURE — 84481 FREE ASSAY (FT-3): CPT

## 2023-03-27 PROCEDURE — 84439 ASSAY OF FREE THYROXINE: CPT

## 2023-03-27 PROCEDURE — 82306 VITAMIN D 25 HYDROXY: CPT

## 2023-03-27 PROCEDURE — 36415 COLL VENOUS BLD VENIPUNCTURE: CPT

## 2023-03-27 PROCEDURE — 83036 HEMOGLOBIN GLYCOSYLATED A1C: CPT

## 2023-03-27 PROCEDURE — 80053 COMPREHEN METABOLIC PANEL: CPT

## 2023-04-03 ENCOUNTER — OFFICE VISIT (OUTPATIENT)
Dept: MEDICAL GROUP | Facility: PHYSICIAN GROUP | Age: 78
End: 2023-04-03
Payer: MEDICARE

## 2023-04-03 VITALS
TEMPERATURE: 97.4 F | RESPIRATION RATE: 14 BRPM | DIASTOLIC BLOOD PRESSURE: 82 MMHG | OXYGEN SATURATION: 96 % | WEIGHT: 170 LBS | HEART RATE: 70 BPM | HEIGHT: 65 IN | SYSTOLIC BLOOD PRESSURE: 142 MMHG | BODY MASS INDEX: 28.32 KG/M2

## 2023-04-03 DIAGNOSIS — E11.65 TYPE 2 DIABETES MELLITUS WITH HYPERGLYCEMIA, WITHOUT LONG-TERM CURRENT USE OF INSULIN (HCC): ICD-10-CM

## 2023-04-03 DIAGNOSIS — I10 PRIMARY HYPERTENSION: ICD-10-CM

## 2023-04-03 DIAGNOSIS — G89.29 CHRONIC PAIN OF RIGHT KNEE: ICD-10-CM

## 2023-04-03 DIAGNOSIS — M25.562 CHRONIC PAIN OF LEFT KNEE: ICD-10-CM

## 2023-04-03 DIAGNOSIS — Z96.652 HISTORY OF LEFT KNEE REPLACEMENT: ICD-10-CM

## 2023-04-03 DIAGNOSIS — G89.29 CHRONIC PAIN OF LEFT KNEE: ICD-10-CM

## 2023-04-03 DIAGNOSIS — E03.9 HYPOTHYROIDISM, UNSPECIFIED TYPE: ICD-10-CM

## 2023-04-03 DIAGNOSIS — E78.5 DYSLIPIDEMIA: ICD-10-CM

## 2023-04-03 DIAGNOSIS — M25.561 CHRONIC PAIN OF RIGHT KNEE: ICD-10-CM

## 2023-04-03 PROBLEM — R73.03 PREDIABETES: Status: RESOLVED | Noted: 2019-12-26 | Resolved: 2023-04-03

## 2023-04-03 PROCEDURE — 99214 OFFICE O/P EST MOD 30 MIN: CPT | Performed by: INTERNAL MEDICINE

## 2023-04-03 RX ORDER — DAPAGLIFLOZIN 5 MG/1
5 TABLET, FILM COATED ORAL DAILY
Qty: 30 TABLET | Refills: 3 | Status: SHIPPED | OUTPATIENT
Start: 2023-04-03 | End: 2023-05-17

## 2023-04-03 NOTE — PROGRESS NOTES
Established Patient    Chief Complaint   Patient presents with    Follow-Up       Subjective:     HPI:   Kaylee presents today with the following.    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 100 Tablet 0    amLODIPine (NORVASC) 10 MG Tab Take 1 Tablet by mouth every day. 60 Tablet 0    levothyroxine (SYNTHROID) 100 MCG Tab Take 1 Tablet by mouth every morning on an empty stomach. 100 Tablet 0    lidocaine (XYLOCAINE) 2 % Solution Take 10 mL by mouth as needed for Throat/Mouth Pain. 100 mL 6    timolol (TIMOPTIC) 0.5 % Solution Administer 1 Drop into both eyes 2 times a day.      latanoprost (XALATAN) 0.005 % Solution Administer 1 Drop into both eyes every evening.      Omega-3 Fatty Acids (FISH OIL) 1200 MG Cap Take  by mouth.      linaCLOtide (LINZESS) 145 MCG Cap TAKE 1 CAPSULE BY MOUTH ONCE DAILY AS NEEDED 90 Capsule 1    MAGNESIUM PO Take  by mouth.       No current facility-administered medications on file prior to visit.  "      Allergies, past medical history, past surgical history, family history, social history reviewed and updated    ROS:  All other systems reviewed and are negative except as stated in the HPI       Physical Exam:     BP (!) 142/82 (BP Location: Left arm, Patient Position: Sitting, BP Cuff Size: Adult)   Pulse 70   Temp 36.3 °C (97.4 °F) (Temporal)   Resp 14   Ht 1.651 m (5' 5\")   Wt 77.1 kg (170 lb)   SpO2 96%   BMI 28.29 kg/m²   General: Normal appearing. No distress.  Pulmonary: Clear to ausculation.  Normal effort.   Cardiovascular: Regular rate and rhythm    Assessment and Plan:     77 y.o. female with the following issues.    1. Chronic pain of left knee    2. Chronic pain of right knee  Still patient complaining of both knee joint pain, now his right knee joint pain, she had imaging by orthopedic last year with severe osteoarthritis, she is currently seen by [facility] orthopedic, status post knee injection for steroid, she is not able to perform physical therapy due to worsening of pain, she tried swimming however she had some potential fall and she is not going back to swimming practice, educated regarding stationary physical therapy as well, advised to let me know if she has peripheral referral for physical therapy, patient continue follow-up with orthopedic    3. Dyslipidemia    4. Type 2 diabetes mellitus with hyperglycemia, without long-term current use of insulin (HCC)  - dapagliflozin propanediol (FARXIGA) 5 MG Tab; Take 1 Tablet by mouth every day.  Dispense: 30 Tablet; Refill: 3  - Basic Metabolic Panel; Future    5. Hypothyroidism, unspecified type  - TSH; Future    6. History of left knee replacement      Patient had lab with evidence of new onset of diabetes, since left knee replacement, she has been more stationary, she also has access to some sugar and carbs as well, this was resulted in worsening of A1c in diabetic range, she is open to start taking Farxiga and go from there, advised " to perform kidney function within a month after taking Farxiga, also advised to follow-up with ophthalmology      -Discussed diabetic diet in detail, educated regarding management of hypoglycemia, advised regular exercise, educated disease management and weight goals as well as feet care and frequent check of feet.  -Patient to check early morning fasting blood glucose with goal discussed.  - asked to have a BG log with daily fasting and 2 hr postprandial after biggest meal and bring to next visit or send through my chart  -Discussed side effects of medication. Patient confirmed understanding of information.    Patient also had lab with some elevation of TSH, rest of hormones are normal, she is compliant with levothyroxine 100 mcg daily, denying having classical symptoms besides some leg swelling that could be related to being sedentary as well as complicated by left knee replacement, educated in detail regarding conservative management for swelling, she is also taking Lasix as needed    Follow Up:   Labs, 1 month    Please note that this dictation was created using voice recognition software. I have made every reasonable attempt to correct obvious errors, but I expect that there are errors of grammar and possibly content that I did not discover before finalizing the note.    Signed by: Romy Stanton M.D.

## 2023-05-14 SDOH — ECONOMIC STABILITY: HOUSING INSECURITY
IN THE LAST 12 MONTHS, WAS THERE A TIME WHEN YOU DID NOT HAVE A STEADY PLACE TO SLEEP OR SLEPT IN A SHELTER (INCLUDING NOW)?: NO

## 2023-05-14 SDOH — ECONOMIC STABILITY: INCOME INSECURITY: HOW HARD IS IT FOR YOU TO PAY FOR THE VERY BASICS LIKE FOOD, HOUSING, MEDICAL CARE, AND HEATING?: NOT HARD AT ALL

## 2023-05-14 SDOH — ECONOMIC STABILITY: FOOD INSECURITY: WITHIN THE PAST 12 MONTHS, THE FOOD YOU BOUGHT JUST DIDN'T LAST AND YOU DIDN'T HAVE MONEY TO GET MORE.: NEVER TRUE

## 2023-05-14 SDOH — HEALTH STABILITY: PHYSICAL HEALTH: ON AVERAGE, HOW MANY MINUTES DO YOU ENGAGE IN EXERCISE AT THIS LEVEL?: 0 MIN

## 2023-05-14 SDOH — ECONOMIC STABILITY: FOOD INSECURITY: WITHIN THE PAST 12 MONTHS, YOU WORRIED THAT YOUR FOOD WOULD RUN OUT BEFORE YOU GOT MONEY TO BUY MORE.: NEVER TRUE

## 2023-05-14 SDOH — ECONOMIC STABILITY: TRANSPORTATION INSECURITY
IN THE PAST 12 MONTHS, HAS LACK OF RELIABLE TRANSPORTATION KEPT YOU FROM MEDICAL APPOINTMENTS, MEETINGS, WORK OR FROM GETTING THINGS NEEDED FOR DAILY LIVING?: NO

## 2023-05-14 SDOH — ECONOMIC STABILITY: HOUSING INSECURITY: IN THE LAST 12 MONTHS, HOW MANY PLACES HAVE YOU LIVED?: 1

## 2023-05-14 SDOH — HEALTH STABILITY: PHYSICAL HEALTH: ON AVERAGE, HOW MANY DAYS PER WEEK DO YOU ENGAGE IN MODERATE TO STRENUOUS EXERCISE (LIKE A BRISK WALK)?: 0 DAYS

## 2023-05-14 SDOH — HEALTH STABILITY: MENTAL HEALTH
STRESS IS WHEN SOMEONE FEELS TENSE, NERVOUS, ANXIOUS, OR CAN'T SLEEP AT NIGHT BECAUSE THEIR MIND IS TROUBLED. HOW STRESSED ARE YOU?: NOT AT ALL

## 2023-05-14 SDOH — ECONOMIC STABILITY: INCOME INSECURITY: IN THE LAST 12 MONTHS, WAS THERE A TIME WHEN YOU WERE NOT ABLE TO PAY THE MORTGAGE OR RENT ON TIME?: NO

## 2023-05-14 ASSESSMENT — SOCIAL DETERMINANTS OF HEALTH (SDOH)
HOW OFTEN DO YOU GET TOGETHER WITH FRIENDS OR RELATIVES?: THREE TIMES A WEEK
HOW HARD IS IT FOR YOU TO PAY FOR THE VERY BASICS LIKE FOOD, HOUSING, MEDICAL CARE, AND HEATING?: NOT HARD AT ALL
HOW OFTEN DO YOU ATTENT MEETINGS OF THE CLUB OR ORGANIZATION YOU BELONG TO?: MORE THAN 4 TIMES PER YEAR
IN A TYPICAL WEEK, HOW MANY TIMES DO YOU TALK ON THE PHONE WITH FAMILY, FRIENDS, OR NEIGHBORS?: THREE TIMES A WEEK
DO YOU BELONG TO ANY CLUBS OR ORGANIZATIONS SUCH AS CHURCH GROUPS UNIONS, FRATERNAL OR ATHLETIC GROUPS, OR SCHOOL GROUPS?: YES
HOW OFTEN DO YOU ATTEND CHURCH OR RELIGIOUS SERVICES?: MORE THAN 4 TIMES PER YEAR
HOW MANY DRINKS CONTAINING ALCOHOL DO YOU HAVE ON A TYPICAL DAY WHEN YOU ARE DRINKING: PATIENT DOES NOT DRINK
WITHIN THE PAST 12 MONTHS, YOU WORRIED THAT YOUR FOOD WOULD RUN OUT BEFORE YOU GOT THE MONEY TO BUY MORE: NEVER TRUE
DO YOU BELONG TO ANY CLUBS OR ORGANIZATIONS SUCH AS CHURCH GROUPS UNIONS, FRATERNAL OR ATHLETIC GROUPS, OR SCHOOL GROUPS?: YES
HOW OFTEN DO YOU ATTEND CHURCH OR RELIGIOUS SERVICES?: MORE THAN 4 TIMES PER YEAR
HOW OFTEN DO YOU GET TOGETHER WITH FRIENDS OR RELATIVES?: THREE TIMES A WEEK
IN A TYPICAL WEEK, HOW MANY TIMES DO YOU TALK ON THE PHONE WITH FAMILY, FRIENDS, OR NEIGHBORS?: THREE TIMES A WEEK
HOW OFTEN DO YOU HAVE A DRINK CONTAINING ALCOHOL: NEVER
HOW OFTEN DO YOU HAVE SIX OR MORE DRINKS ON ONE OCCASION: NEVER
HOW OFTEN DO YOU ATTENT MEETINGS OF THE CLUB OR ORGANIZATION YOU BELONG TO?: MORE THAN 4 TIMES PER YEAR

## 2023-05-14 ASSESSMENT — LIFESTYLE VARIABLES
AUDIT-C TOTAL SCORE: 0
HOW OFTEN DO YOU HAVE SIX OR MORE DRINKS ON ONE OCCASION: NEVER
HOW OFTEN DO YOU HAVE A DRINK CONTAINING ALCOHOL: NEVER
SKIP TO QUESTIONS 9-10: 1
HOW MANY STANDARD DRINKS CONTAINING ALCOHOL DO YOU HAVE ON A TYPICAL DAY: PATIENT DOES NOT DRINK

## 2023-05-17 ENCOUNTER — OFFICE VISIT (OUTPATIENT)
Dept: MEDICAL GROUP | Facility: PHYSICIAN GROUP | Age: 78
End: 2023-05-17
Payer: MEDICARE

## 2023-05-17 VITALS
DIASTOLIC BLOOD PRESSURE: 68 MMHG | WEIGHT: 179.7 LBS | BODY MASS INDEX: 29.94 KG/M2 | SYSTOLIC BLOOD PRESSURE: 110 MMHG | HEIGHT: 65 IN | TEMPERATURE: 97.7 F | HEART RATE: 86 BPM | OXYGEN SATURATION: 95 %

## 2023-05-17 DIAGNOSIS — I10 PRIMARY HYPERTENSION: ICD-10-CM

## 2023-05-17 DIAGNOSIS — E78.5 DYSLIPIDEMIA: ICD-10-CM

## 2023-05-17 DIAGNOSIS — E11.9 TYPE 2 DIABETES MELLITUS WITHOUT COMPLICATION, WITHOUT LONG-TERM CURRENT USE OF INSULIN (HCC): ICD-10-CM

## 2023-05-17 DIAGNOSIS — E03.9 HYPOTHYROIDISM, UNSPECIFIED TYPE: ICD-10-CM

## 2023-05-17 PROBLEM — E11.65 TYPE 2 DIABETES MELLITUS WITH HYPERGLYCEMIA, WITHOUT LONG-TERM CURRENT USE OF INSULIN (HCC): Status: RESOLVED | Noted: 2023-04-03 | Resolved: 2023-05-17

## 2023-05-17 PROCEDURE — 99214 OFFICE O/P EST MOD 30 MIN: CPT | Performed by: FAMILY MEDICINE

## 2023-05-17 PROCEDURE — 3074F SYST BP LT 130 MM HG: CPT | Performed by: FAMILY MEDICINE

## 2023-05-17 PROCEDURE — 3078F DIAST BP <80 MM HG: CPT | Performed by: FAMILY MEDICINE

## 2023-05-17 PROCEDURE — 1170F FXNL STATUS ASSESSED: CPT | Performed by: FAMILY MEDICINE

## 2023-05-17 RX ORDER — FUROSEMIDE 20 MG/1
20 TABLET ORAL 2 TIMES DAILY
COMMUNITY
Start: 2023-04-03 | End: 2023-05-17 | Stop reason: SDUPTHER

## 2023-05-17 RX ORDER — CELECOXIB 200 MG/1
200 CAPSULE ORAL
Qty: 30 CAPSULE | Refills: 3 | Status: SHIPPED | OUTPATIENT
Start: 2023-05-17 | End: 2023-12-28

## 2023-05-17 RX ORDER — AMLODIPINE BESYLATE 10 MG/1
10 TABLET ORAL DAILY
Qty: 90 TABLET | Refills: 3 | Status: SHIPPED | OUTPATIENT
Start: 2023-05-17

## 2023-05-17 RX ORDER — METFORMIN HYDROCHLORIDE 500 MG/1
1000 TABLET, EXTENDED RELEASE ORAL 2 TIMES DAILY
Qty: 360 TABLET | Refills: 3 | Status: SHIPPED | OUTPATIENT
Start: 2023-05-17 | End: 2023-05-23 | Stop reason: SDUPTHER

## 2023-05-17 RX ORDER — LEVOTHYROXINE SODIUM 112 UG/1
112 TABLET ORAL
Qty: 90 TABLET | Refills: 3 | Status: SHIPPED | OUTPATIENT
Start: 2023-05-17 | End: 2023-11-30 | Stop reason: SDUPTHER

## 2023-05-17 RX ORDER — FUROSEMIDE 20 MG/1
20 TABLET ORAL DAILY
Qty: 90 TABLET | Refills: 3 | Status: SHIPPED | OUTPATIENT
Start: 2023-05-17

## 2023-05-17 RX ORDER — CELECOXIB 200 MG/1
CAPSULE ORAL
COMMUNITY
Start: 2023-05-08 | End: 2023-05-17 | Stop reason: SDUPTHER

## 2023-05-17 NOTE — PROGRESS NOTES
CHIEF COMPLAINT / REASON FOR VISIT  Kaylee Nazario is a 77 y.o. female that presents today to [facility] care.    HISTORY OF PRESENT ILLNESS  Does not have any particular concerns today, just medication refills.  Point-of-care A1c 6.6    Past Medical History  Bilateral lower extremity edema since left TKA in 2022  Hypercholesterolemia - LDL-C 100 on 3/27/23, on atorvastatin 20 mg daily  Hypothyroidism - levothyroxine 100 mcg daily    Past Surgical History:   Procedure Laterality Date    PB MANIPULATN KNEE JT+ANESTHESIA Left 6/6/2022    Procedure: LEFT KNEE MANIPULATION;  Surgeon: Bean Escobar M.D.;  Location: [facility];  Service: Orthopedics    PB TOTAL KNEE ARTHROPLASTY Left 4/4/2022    Procedure: LEFT TOTAL KNEE ARTHROPLASTY;  Surgeon: Bean Escobar M.D.;  Location: [facility];  Service: Orthopedics    PB INCISE FINGER TENDON SHEATH Right 10/26/2021    Procedure: RIGHT THUMB TRIGGER RELEASE;  Surgeon: Brett Maddox M.D.;  Location: [facility];  Service: Orthopedics    KNEE ARTHROSCOPY Right 6/8/2016    Procedure: KNEE ARTHROSCOPY, MEDIAL MENISECTOMY, DEBRIEDMENT OF LATERAL  FEMORAL CONDYLE;  Surgeon: Real Carias M.D.;  Location: [facility];  Service:     LATERAL RELEASE Right 6/8/2016    Procedure: LATERAL RELEASE;  Surgeon: Real Carias M.D.;  Location: [facility];  Service:     TRIGGER FINGER RELEASE Bilateral 4/19/2016    Procedure: TRIGGER FINGER RELEASE-RING;  Surgeon: Brett Maddox M.D.;  Location: [facility];  Service:     TRIGGER FINGER RELEASE  12/26/2012    Performed by Brett Maddox M.D. at [facility]    COLONOSCOPY  8/2011    CATARACT EXTRACTION WITH IOL Left 3/2009    left    TUBAL LIGATION  1980's    BREAST BIOPSY      multiple breast biopsies    TONSILLECTOMY  as child         Social History     Tobacco Use    Smoking status: Never    Smokeless tobacco: Never  "  Vaping Use    Vaping Use: Never used   Substance Use Topics    Alcohol use: No     Alcohol/week: 0.0 oz    Drug use: No       OBJECTIVE    /68 (BP Location: Right arm, Patient Position: Sitting, BP Cuff Size: Adult)   Pulse 86   Temp 36.5 °C (97.7 °F) (Temporal)   Ht 1.651 m (5' 5\")   Wt 81.5 kg (179 lb 11.2 oz)   SpO2 95%   BMI 29.90 kg/m²  Body mass index is 29.9 kg/m².    PHYSICAL EXAM  Constitutional: Sitting comfortably, in no acute distress, responds to questions appropriately.  Neck: No cervical lymphadenopathy  Heart: Regular S1 S2, no murmurs, rub, or gallops  Lungs: Clear to auscultation bilaterally, no wheezes, rales, or rhonchi  Skin: Warm and dry, no rashes or lesions on face or exposed upper extremities    ASSESSMENT & PLAN  1. Type 2 diabetes mellitus without complication, without long-term current use of insulin (HCC)  New diagnosis type 2 diabetes mellitus.  After discussion decided to initiate metformin 1000 mg daily.  No microvascular complications.  Follow-up in 3 months with repeat A1c.  - metFORMIN ER (GLUCOPHAGE XR) 500 MG TABLET SR 24 HR; Take 2 Tablets by mouth 2 times a day.  Dispense: 360 Tablet; Refill: 3  - Microalbumin Creat Ratio Urine (Clinic Collect); Future    2. Hypothyroidism, unspecified type  TSH is high at 6.0, will increase levothyroxine dose and recheck TSH in 4 to 6 weeks  - levothyroxine (SYNTHROID) 112 MCG Tab; Take 1 Tablet by mouth every morning on an empty stomach.  Dispense: 90 Tablet; Refill: 3    3. Dyslipidemia  Chronic, controlled with LDL-C 100 on 3/27/23, on atorvastatin 20 mg daily.  Continue current therapy    4. Primary hypertension  Chronic, controlled on amlodipine 10 mg daily.  Continue current therapy.  - amLODIPine (NORVASC) 10 MG Tab; Take 1 Tablet by mouth every day.  Dispense: 90 Tablet; Refill: 3    Follow-up in 3 months with repeat TSH and A1c  "

## 2023-05-23 ENCOUNTER — TELEPHONE (OUTPATIENT)
Dept: MEDICAL GROUP | Facility: PHYSICIAN GROUP | Age: 78
End: 2023-05-23
Payer: MEDICARE

## 2023-05-23 DIAGNOSIS — E11.9 TYPE 2 DIABETES MELLITUS WITHOUT COMPLICATION, WITHOUT LONG-TERM CURRENT USE OF INSULIN (HCC): ICD-10-CM

## 2023-05-23 RX ORDER — METFORMIN HYDROCHLORIDE 500 MG/1
1000 TABLET, EXTENDED RELEASE ORAL 2 TIMES DAILY
Qty: 400 TABLET | Refills: 3 | Status: SHIPPED | OUTPATIENT
Start: 2023-05-23

## 2023-05-23 NOTE — TELEPHONE ENCOUNTER
Received request via: Pharmacy    Was the patient seen in the last year in this department? Yes    Does the patient have an active prescription (recently filled or refills available) for medication(s) requested? yes    Does the patient have [facility] living Plus and need 100 day supply (blood pressure, diabetes and cholesterol meds only)? Yes, quantity updated to 100 days

## 2023-08-17 ENCOUNTER — HOSPITAL ENCOUNTER (OUTPATIENT)
Facility: MEDICAL CENTER | Age: 78
End: 2023-08-17
Attending: FAMILY MEDICINE
Payer: MEDICARE

## 2023-08-17 ENCOUNTER — OFFICE VISIT (OUTPATIENT)
Dept: MEDICAL GROUP | Facility: PHYSICIAN GROUP | Age: 78
End: 2023-08-17
Payer: MEDICARE

## 2023-08-17 VITALS
HEIGHT: 65 IN | DIASTOLIC BLOOD PRESSURE: 60 MMHG | HEART RATE: 80 BPM | SYSTOLIC BLOOD PRESSURE: 124 MMHG | BODY MASS INDEX: 27.82 KG/M2 | RESPIRATION RATE: 14 BRPM | WEIGHT: 167 LBS | TEMPERATURE: 98 F | OXYGEN SATURATION: 96 %

## 2023-08-17 DIAGNOSIS — G89.29 CHRONIC PAIN OF RIGHT KNEE: ICD-10-CM

## 2023-08-17 DIAGNOSIS — E11.9 TYPE 2 DIABETES MELLITUS WITHOUT COMPLICATION, WITHOUT LONG-TERM CURRENT USE OF INSULIN (HCC): ICD-10-CM

## 2023-08-17 DIAGNOSIS — M25.561 CHRONIC PAIN OF RIGHT KNEE: ICD-10-CM

## 2023-08-17 LAB
CREAT UR-MCNC: 155.73 MG/DL
MICROALBUMIN UR-MCNC: <1.2 MG/DL
MICROALBUMIN/CREAT UR: NORMAL MG/G (ref 0–30)

## 2023-08-17 PROCEDURE — 3078F DIAST BP <80 MM HG: CPT | Performed by: FAMILY MEDICINE

## 2023-08-17 PROCEDURE — 3074F SYST BP LT 130 MM HG: CPT | Performed by: FAMILY MEDICINE

## 2023-08-17 PROCEDURE — 82043 UR ALBUMIN QUANTITATIVE: CPT

## 2023-08-17 PROCEDURE — 99214 OFFICE O/P EST MOD 30 MIN: CPT | Performed by: FAMILY MEDICINE

## 2023-08-17 PROCEDURE — 1170F FXNL STATUS ASSESSED: CPT | Performed by: FAMILY MEDICINE

## 2023-08-17 PROCEDURE — 82570 ASSAY OF URINE CREATININE: CPT

## 2023-08-17 RX ORDER — GABAPENTIN 100 MG/1
100 CAPSULE ORAL 3 TIMES DAILY
Qty: 90 CAPSULE | Refills: 3 | Status: SHIPPED | OUTPATIENT
Start: 2023-08-17 | End: 2024-03-21

## 2023-08-17 NOTE — PROGRESS NOTES
"CHIEF COMPLAINT / REASON FOR VISIT  Kaylee Nazario is a 77 y.o. female that presents today for diabetes f/u visit    HISTORY OF PRESENT ILLNESS  Has been taking metformin 500 mg twice per day for diabetes which she is tolerating well.     Chronic right knee pain, uncontrolled. Her last 2 intra-articular steroid injections were not effective.  She does not want any surgeries.  Wants to try gabapentin    OBJECTIVE     /60 (BP Location: Right arm, Patient Position: Sitting, BP Cuff Size: Adult)   Pulse 80   Temp 36.7 °C (98 °F) (Temporal)   Resp 14   Ht 1.651 m (5' 5\")   Wt 75.8 kg (167 lb)   SpO2 96%   BMI 27.79 kg/m²      PHYSICAL EXAM  Constitutional: Sitting comfortably, in no acute distress, responds to questions appropriately.  Eyes:  No conjunctival injection, no scleral icterus, PERRL  Ears: Normal tympanic membranes, external ear canals clear  Heart: Regular S1 S2, no murmurs, rub, or gallops  Lungs: Clear to auscultation bilaterally, no wheezes, rales, or rhonchi  Extremities: Sensation intact to monofilament in 5/5 spots bilateral soles, no preulcerative calluses or lesions  Skin: Warm and dry    ASSESSMENT & PLAN  1. Type 2 diabetes mellitus without complication, without long-term current use of insulin (HCC)  Chronic, last A1c at goal of less than 7.  Continue metformin 500 mg twice daily.  Follow-up in 3 months  - Microalbumin Creat Ratio Urine - Clinic Collect; Future  - Diabetic Monofilament Lower Extremity Exam    2. Chronic pain of right knee  Chronic right knee pain, uncontrolled, secondary to osteoarthritis.  Follows with orthopedics, may be getting viscosupplement injection in future.  Her last 2 intra-articular steroid injections were not effective.  She does not want any surgeries.  We will trial gabapentin 100 mg 3 times daily.  - gabapentin (NEURONTIN) 100 MG Cap; Take 1 Capsule by mouth 3 times a day.  Dispense: 90 Capsule; Refill: 3      "

## 2023-10-06 ENCOUNTER — OFFICE VISIT (OUTPATIENT)
Dept: MEDICAL GROUP | Facility: PHYSICIAN GROUP | Age: 78
End: 2023-10-06
Payer: MEDICARE

## 2023-10-06 ENCOUNTER — HOSPITAL ENCOUNTER (OUTPATIENT)
Facility: MEDICAL CENTER | Age: 78
End: 2023-10-06
Attending: STUDENT IN AN ORGANIZED HEALTH CARE EDUCATION/TRAINING PROGRAM
Payer: MEDICARE

## 2023-10-06 VITALS
DIASTOLIC BLOOD PRESSURE: 70 MMHG | OXYGEN SATURATION: 94 % | HEART RATE: 74 BPM | WEIGHT: 167 LBS | SYSTOLIC BLOOD PRESSURE: 118 MMHG | HEIGHT: 65 IN | BODY MASS INDEX: 27.82 KG/M2 | TEMPERATURE: 97.9 F

## 2023-10-06 DIAGNOSIS — N39.0 URINARY TRACT INFECTION WITHOUT HEMATURIA, SITE UNSPECIFIED: ICD-10-CM

## 2023-10-06 DIAGNOSIS — N30.00 ACUTE CYSTITIS WITHOUT HEMATURIA: ICD-10-CM

## 2023-10-06 PROCEDURE — 3074F SYST BP LT 130 MM HG: CPT | Performed by: STUDENT IN AN ORGANIZED HEALTH CARE EDUCATION/TRAINING PROGRAM

## 2023-10-06 PROCEDURE — 99213 OFFICE O/P EST LOW 20 MIN: CPT | Performed by: STUDENT IN AN ORGANIZED HEALTH CARE EDUCATION/TRAINING PROGRAM

## 2023-10-06 PROCEDURE — 87077 CULTURE AEROBIC IDENTIFY: CPT

## 2023-10-06 PROCEDURE — 1170F FXNL STATUS ASSESSED: CPT | Performed by: STUDENT IN AN ORGANIZED HEALTH CARE EDUCATION/TRAINING PROGRAM

## 2023-10-06 PROCEDURE — 3078F DIAST BP <80 MM HG: CPT | Performed by: STUDENT IN AN ORGANIZED HEALTH CARE EDUCATION/TRAINING PROGRAM

## 2023-10-06 PROCEDURE — 87086 URINE CULTURE/COLONY COUNT: CPT

## 2023-10-06 PROCEDURE — 87186 SC STD MICRODIL/AGAR DIL: CPT

## 2023-10-06 RX ORDER — NITROFURANTOIN 25; 75 MG/1; MG/1
100 CAPSULE ORAL 2 TIMES DAILY
Qty: 10 CAPSULE | Refills: 0 | Status: SHIPPED | OUTPATIENT
Start: 2023-10-06 | End: 2023-10-06 | Stop reason: SDUPTHER

## 2023-10-06 RX ORDER — NITROFURANTOIN 25; 75 MG/1; MG/1
100 CAPSULE ORAL 2 TIMES DAILY
Qty: 10 CAPSULE | Refills: 0 | Status: SHIPPED | OUTPATIENT
Start: 2023-10-06 | End: 2023-10-11

## 2023-10-06 NOTE — PROGRESS NOTES
"Subjective:     CC: Dsyuria    HPI:   Kaylee presents today with two day hx of dysuria.    Has been drinking cranberry juice without relief. Symptoms similar to when she had UTI in the past. No fevers/chills, nausea/vomiting and back pain.     unaccompanied today    No problems updated.    Health Maintenance: Not completed today    ROS: As noted in HPI      Objective:     Exam:  /70 (BP Location: Right arm, Patient Position: Sitting, BP Cuff Size: Adult)   Pulse 74   Temp 36.6 °C (97.9 °F) (Temporal)   Ht 1.651 m (5' 5\")   Wt 75.8 kg (167 lb)   SpO2 94%   BMI 27.79 kg/m²  Body mass index is 27.79 kg/m².    Physical Exam  Constitutional:       Appearance: Normal appearance.   Cardiovascular:      Rate and Rhythm: Normal rate and regular rhythm.      Heart sounds: Normal heart sounds.   Pulmonary:      Effort: Pulmonary effort is normal. No respiratory distress.      Breath sounds: Normal breath sounds. No stridor. No wheezing, rhonchi or rales.   Chest:      Chest wall: No tenderness.   Abdominal:      Palpations: Abdomen is soft.      Tenderness: There is no abdominal tenderness. There is no right CVA tenderness or left CVA tenderness.   Neurological:      Mental Status: She is alert.             Labs:     Assessment & Plan:     77 y.o. female with the following -     1. Acute cystitis without hematuria  2. Urinary tract infection without hematuria, site unspecified  UA in office consistent with UTI, will send urine culture. Prescription for Macrobid 100mg BID for 5 days sent to pharmacy. Advised patient to stay well hydrated. Red flag signs/symptoms discussed with the patient.  - URINE CULTURE(NEW); Future  - nitrofurantoin (MACROBID) 100 MG Cap; Take 1 Capsule by mouth 2 times a day for 5 days.  Dispense: 10 Capsule; Refill: 0        "

## 2023-10-08 LAB
BACTERIA UR CULT: ABNORMAL
BACTERIA UR CULT: ABNORMAL
SIGNIFICANT IND 70042: ABNORMAL
SITE SITE: ABNORMAL
SOURCE SOURCE: ABNORMAL

## 2023-10-11 ENCOUNTER — TELEPHONE (OUTPATIENT)
Dept: MEDICAL GROUP | Facility: PHYSICIAN GROUP | Age: 78
End: 2023-10-11
Payer: MEDICARE

## 2023-10-11 NOTE — TELEPHONE ENCOUNTER
Phone Number Called: [phone] (home)       Call outcome: Spoke to patient regarding message below.    Message: Notified pt of her lab result from 10/06/2023. Pt acknowledged understanding, stated that she finished her antibiotics and is feeling better.

## 2023-11-13 ENCOUNTER — OFFICE VISIT (OUTPATIENT)
Dept: MEDICAL GROUP | Facility: MEDICAL CENTER | Age: 78
End: 2023-11-13
Payer: MEDICARE

## 2023-11-13 VITALS
HEIGHT: 65 IN | HEART RATE: 82 BPM | BODY MASS INDEX: 27.49 KG/M2 | TEMPERATURE: 97.9 F | RESPIRATION RATE: 14 BRPM | OXYGEN SATURATION: 95 % | DIASTOLIC BLOOD PRESSURE: 66 MMHG | WEIGHT: 165 LBS | SYSTOLIC BLOOD PRESSURE: 112 MMHG
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
## 2023-11-13 DIAGNOSIS — U07.1 COVID: ICD-10-CM

## 2023-11-13 DIAGNOSIS — R68.89 FLU-LIKE SYMPTOMS: ICD-10-CM

## 2023-11-13 DIAGNOSIS — J02.9 SORE THROAT: ICD-10-CM

## 2023-11-13 LAB
FLUAV RNA SPEC QL NAA+PROBE: NEGATIVE
FLUBV RNA SPEC QL NAA+PROBE: NEGATIVE
RSV RNA SPEC QL NAA+PROBE: NEGATIVE
S PYO DNA SPEC NAA+PROBE: NOT DETECTED
SARS-COV-2 RNA RESP QL NAA+PROBE: POSITIVE

## 2023-11-13 PROCEDURE — 3074F SYST BP LT 130 MM HG: CPT | Performed by: STUDENT IN AN ORGANIZED HEALTH CARE EDUCATION/TRAINING PROGRAM

## 2023-11-13 PROCEDURE — 0241U POCT CEPHEID COV-2, FLU A/B, RSV - PCR: CPT | Performed by: STUDENT IN AN ORGANIZED HEALTH CARE EDUCATION/TRAINING PROGRAM

## 2023-11-13 PROCEDURE — 99213 OFFICE O/P EST LOW 20 MIN: CPT | Performed by: STUDENT IN AN ORGANIZED HEALTH CARE EDUCATION/TRAINING PROGRAM

## 2023-11-13 PROCEDURE — 1170F FXNL STATUS ASSESSED: CPT | Performed by: STUDENT IN AN ORGANIZED HEALTH CARE EDUCATION/TRAINING PROGRAM

## 2023-11-13 PROCEDURE — 3078F DIAST BP <80 MM HG: CPT | Performed by: STUDENT IN AN ORGANIZED HEALTH CARE EDUCATION/TRAINING PROGRAM

## 2023-11-13 PROCEDURE — 87651 STREP A DNA AMP PROBE: CPT | Performed by: STUDENT IN AN ORGANIZED HEALTH CARE EDUCATION/TRAINING PROGRAM

## 2023-11-13 ASSESSMENT — ENCOUNTER SYMPTOMS
NAUSEA: 0
FOCAL WEAKNESS: 0
SPUTUM PRODUCTION: 0
ABDOMINAL PAIN: 0
SHORTNESS OF BREATH: 1
MYALGIAS: 0
SINUS PAIN: 0
VOMITING: 0
SORE THROAT: 1
BACK PAIN: 1
DIZZINESS: 1
DIARRHEA: 0
DIAPHORESIS: 0
TINGLING: 0
CHILLS: 1
COUGH: 1
HEADACHES: 0
FEVER: 0

## 2023-11-13 NOTE — PROGRESS NOTES
"Subjective:     CC: falls, sore throat, flu-like symptoms    HPI:   Kaylee presents today with    Patient had UTI 5 weeks ago and finished course of antibiotics. Reports bad cold 7 days ago with cough, chills, SOB, sore throat. Fell on floor from bed on left side 2 days ago. Could not move both legs and both arm at that time. Fell 2 more times that day. Now ambulating with walker.    ROS:  Review of Systems   Constitutional:  Positive for chills and malaise/fatigue. Negative for diaphoresis and fever.   HENT:  Positive for congestion and sore throat. Negative for sinus pain.    Respiratory:  Positive for cough and shortness of breath. Negative for sputum production.    Cardiovascular:  Negative for chest pain and leg swelling.   Gastrointestinal:  Negative for abdominal pain, diarrhea, nausea and vomiting.   Genitourinary:  Negative for dysuria, frequency and urgency.   Musculoskeletal:  Positive for back pain and joint pain. Negative for myalgias.   Neurological:  Positive for dizziness. Negative for tingling, focal weakness and headaches.       Objective:     Exam:  /66 (BP Location: Left arm, Patient Position: Sitting, BP Cuff Size: Adult)   Pulse 82   Temp 36.6 °C (97.9 °F) (Temporal)   Resp 14   Ht 1.651 m (5' 5\")   Wt 74.8 kg (165 lb)   SpO2 95%   BMI 27.46 kg/m²  Body mass index is 27.46 kg/m².    Physical Exam  Vitals reviewed.   HENT:      Head: Normocephalic.      Mouth/Throat:      Mouth: Mucous membranes are moist.      Pharynx: Oropharynx is clear. Posterior oropharyngeal erythema present. No oropharyngeal exudate.   Eyes:      Pupils: Pupils are equal, round, and reactive to light.   Cardiovascular:      Rate and Rhythm: Normal rate and regular rhythm.   Pulmonary:      Effort: Pulmonary effort is normal. No respiratory distress.      Breath sounds: No wheezing or rales.   Abdominal:      General: Abdomen is flat. Bowel sounds are normal. There is no distension.      Tenderness: There is " no abdominal tenderness. There is no guarding.   Skin:     General: Skin is warm.   Neurological:      General: No focal deficit present.      Mental Status: She is alert and oriented to person, place, and time.      Motor: No weakness.         Assessment & Plan:     77 y.o. female with the following -     1. Fall, initial encounter  Initially fell off bed. After that fell 2 more times 2 days ago. Due to b/l leg weakness.  - CT-HEAD W/O; Future  - Referral to Physical Therapy    2. Sore throat  - POCT CEPHEID GROUP A STREP - PCR    3. Flu-like symptoms  4. COVID  Outside of treatment window for paxlovid.  OTC Tylenol or Motrin for fever/discomfort.  OTC cough/cold medication for symptomatic relief  OTC Supportive Care for Congestion - saline nasal spray or neti pot  Drink plenty of fluids  Return to clinic or go to the ED if symptoms worsen or fail to improve  - POCT CEPHEID COV-2, FLU A/B, RSV - PCR - covid +    Follow up with PCP    Please note that this dictation was created using voice recognition software. I have made every reasonable attempt to correct obvious errors, but I expect that there are errors of grammar and possibly content that I did not discover before finalizing the note.

## 2023-11-21 ENCOUNTER — HOSPITAL ENCOUNTER (OUTPATIENT)
Dept: RADIOLOGY | Facility: MEDICAL CENTER | Age: 78
End: 2023-11-21
Attending: STUDENT IN AN ORGANIZED HEALTH CARE EDUCATION/TRAINING PROGRAM
Payer: MEDICARE
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
## 2023-11-21 PROCEDURE — 70450 CT HEAD/BRAIN W/O DYE: CPT

## 2023-11-27 ENCOUNTER — HOSPITAL ENCOUNTER (OUTPATIENT)
Dept: LAB | Facility: MEDICAL CENTER | Age: 78
End: 2023-11-27
Attending: FAMILY MEDICINE
Payer: MEDICARE

## 2023-11-27 DIAGNOSIS — E11.9 TYPE 2 DIABETES MELLITUS WITHOUT COMPLICATION, WITHOUT LONG-TERM CURRENT USE OF INSULIN (HCC): ICD-10-CM

## 2023-11-27 DIAGNOSIS — E03.9 HYPOTHYROIDISM, UNSPECIFIED TYPE: ICD-10-CM

## 2023-11-27 LAB
CREAT UR-MCNC: 351.68 MG/DL
EST. AVERAGE GLUCOSE BLD GHB EST-MCNC: 137 MG/DL
HBA1C MFR BLD: 6.4 % (ref 4–5.6)
MICROALBUMIN UR-MCNC: 3.1 MG/DL
MICROALBUMIN/CREAT UR: 9 MG/G (ref 0–30)
TSH SERPL DL<=0.005 MIU/L-ACNC: 0.13 UIU/ML (ref 0.38–5.33)

## 2023-11-27 PROCEDURE — 36415 COLL VENOUS BLD VENIPUNCTURE: CPT

## 2023-11-27 PROCEDURE — 84439 ASSAY OF FREE THYROXINE: CPT

## 2023-11-27 PROCEDURE — 84443 ASSAY THYROID STIM HORMONE: CPT

## 2023-11-27 PROCEDURE — 83036 HEMOGLOBIN GLYCOSYLATED A1C: CPT

## 2023-11-27 PROCEDURE — 82570 ASSAY OF URINE CREATININE: CPT

## 2023-11-27 PROCEDURE — 82043 UR ALBUMIN QUANTITATIVE: CPT

## 2023-11-28 LAB — T4 FREE SERPL-MCNC: 2.19 NG/DL (ref 0.93–1.7)

## 2023-11-30 ENCOUNTER — OFFICE VISIT (OUTPATIENT)
Dept: MEDICAL GROUP | Facility: PHYSICIAN GROUP | Age: 78
End: 2023-11-30
Payer: MEDICARE

## 2023-11-30 VITALS
DIASTOLIC BLOOD PRESSURE: 72 MMHG | OXYGEN SATURATION: 92 % | BODY MASS INDEX: 27.11 KG/M2 | SYSTOLIC BLOOD PRESSURE: 124 MMHG | HEIGHT: 65 IN | TEMPERATURE: 97.6 F | HEART RATE: 89 BPM | RESPIRATION RATE: 14 BRPM | WEIGHT: 162.7 LBS

## 2023-11-30 DIAGNOSIS — G89.29 CHRONIC PAIN OF RIGHT KNEE: ICD-10-CM

## 2023-11-30 DIAGNOSIS — E11.9 TYPE 2 DIABETES MELLITUS WITHOUT COMPLICATION, WITHOUT LONG-TERM CURRENT USE OF INSULIN (HCC): ICD-10-CM

## 2023-11-30 DIAGNOSIS — E03.9 HYPOTHYROIDISM, UNSPECIFIED TYPE: ICD-10-CM

## 2023-11-30 DIAGNOSIS — M25.561 CHRONIC PAIN OF RIGHT KNEE: ICD-10-CM

## 2023-11-30 PROBLEM — U07.1 COVID: Status: RESOLVED | Noted: 2023-11-13 | Resolved: 2023-11-30

## 2023-11-30 PROCEDURE — 1170F FXNL STATUS ASSESSED: CPT | Performed by: FAMILY MEDICINE

## 2023-11-30 PROCEDURE — 99214 OFFICE O/P EST MOD 30 MIN: CPT | Performed by: FAMILY MEDICINE

## 2023-11-30 PROCEDURE — 3078F DIAST BP <80 MM HG: CPT | Performed by: FAMILY MEDICINE

## 2023-11-30 PROCEDURE — 3074F SYST BP LT 130 MM HG: CPT | Performed by: FAMILY MEDICINE

## 2023-11-30 RX ORDER — LEVOTHYROXINE SODIUM 0.1 MG/1
100 TABLET ORAL
Qty: 90 TABLET | Refills: 3 | Status: SHIPPED | OUTPATIENT
Start: 2023-11-30

## 2023-11-30 NOTE — LETTER
[facility]  Phani Gutierrez M.D.  [address]  [place] [address]  Fax: [phone]   Authorization for Release/Disclosure of   Protected Health Information   Name: ELDRE LEA : 1945 SSN: [id number]   Address: [address] [place] [id number] Phone:    [phone] (home)    I authorize the entity listed below to release/disclose the PHI below to:   [facility]/Phani Gutierrez M.D. and Phani Gutierrez M.D.   Provider or Entity Name:  [facility]    Address   City, [facility], [facility]   Phone:      Fax:  [phone]   Reason for request: continuity of care   Information to be released:    [  ] LAST COLONOSCOPY,  including any PATH REPORT and follow-up  [  ] LAST FIT/COLOGUARD RESULT [  ] LAST DEXA  [  ] LAST MAMMOGRAM  [  ] LAST PAP  [  ] LAST LABS [  ] RETINA EXAM REPORT  [  ] IMMUNIZATION RECORDS  [ xx ] Release all info      [  ] Check here and initial the line next to each item to release ALL health information INCLUDING  _____ Care and treatment for drug and / or alcohol abuse  _____ HIV testing, infection status, or AIDS  _____ Genetic Testing    DATES OF SERVICE OR TIME PERIOD TO BE DISCLOSED: _____________  I understand and acknowledge that:  * This Authorization may be revoked at any time by you in writing, except if your health information has already been used or disclosed.  * Your health information that will be used or disclosed as a result of you signing this authorization could be re-disclosed by the recipient. If this occurs, your re-disclosed health information may no longer be protected by State or Federal laws.  * You may refuse to sign this Authorization. Your refusal will not affect your ability to obtain treatment.  * This Authorization becomes effective upon signing and will  on (date) __________.      If no date is indicated, this Authorization will  one (1) year from the signature date.    Name: Elder Lea  Signature:  continuity of care Date:   11/30/2023     PLEASE FAX REQUESTED RECORDS BACK TO: [phone]

## 2023-11-30 NOTE — PROGRESS NOTES
"CHIEF COMPLAINT / REASON FOR VISIT  Kaylee Nazario is a 77 y.o. female that presents today for follow up    HISTORY OF PRESENT ILLNESS  Since COVID 2 weeks ago, decreased appetite, cough, not feeling well in general.     Has had 2 UTIs recently.     Knee pain still bothering her. Will consider pain med referral for consideration of knee nerve ablation    Currently taking metformin 500 mg BID    OBJECTIVE     /72 (BP Location: Left arm, Patient Position: Sitting, BP Cuff Size: Adult)   Pulse 89   Temp 36.4 °C (97.6 °F) (Temporal)   Resp 14   Ht 1.651 m (5' 5\")   Wt 73.8 kg (162 lb 11.2 oz)   SpO2 92%   BMI 27.07 kg/m²      PHYSICAL EXAM  Constitutional: Sitting comfortably, in no acute distress, responds to questions appropriately.  Skin: Warm and dry, no rashes or lesions on face or exposed upper extremities    ASSESSMENT & PLAN  1. Type 2 diabetes mellitus without complication, without long-term current use of insulin (HCC)  Chronic, controlled with hemoglobin A1c 6.4 on 11/27/2023. Continue metformin 500 mg BID. Follow up every 6 months    2. Hypothyroidism, unspecified type  Uncontrolled with TSH suppressed at 0.13 and elevated free T4 of 2.19.  Will reduce levothyroxine dose to 100 mg daily  - levothyroxine (SYNTHROID) 100 MCG Tab; Take 1 Tablet by mouth every morning on an empty stomach.  Dispense: 90 Tablet; Refill: 3    3. Chronic pain of right knee  Chronic, uncontrolled, not improved with viscosupplement injections or steroid injections.  Does not want to undergo surgery.  Recommended further evaluation by pain medicine for consideration of nerve ablation.  She will consider this              "

## 2023-12-28 ENCOUNTER — TELEPHONE (OUTPATIENT)
Dept: FAMILY PLANNING/WOMEN'S HEALTH CLINIC | Facility: PHYSICIAN GROUP | Age: 78
End: 2023-12-28
Payer: MEDICARE

## 2023-12-28 RX ORDER — CELECOXIB 200 MG/1
200 CAPSULE ORAL
Qty: 30 CAPSULE | Refills: 0 | Status: SHIPPED | OUTPATIENT
Start: 2023-12-28 | End: 2024-02-20

## 2023-12-28 NOTE — TELEPHONE ENCOUNTER
I called and spoke to patient and she stated she would give us a callback at the beginning of next year to scheduled her appt. [facility] pa phone number was provided.

## 2024-02-16 NOTE — TELEPHONE ENCOUNTER
Received request via: Patient    Was the patient seen in the last year in this department? Yes    Does the patient have an active prescription (recently filled or refills available) for medication(s) requested? No    Pharm[facility] Name[facility]    Does the patient have [facility] Plus and need 100 day supply (blood pressure, diabetes and cholesterol meds only)? Medication is not for cholesterol, blood pressure or diabetes

## 2024-02-20 RX ORDER — CELECOXIB 200 MG/1
200 CAPSULE ORAL
Qty: 30 CAPSULE | Refills: 0 | Status: SHIPPED | OUTPATIENT
Start: 2024-02-20 | End: 2024-03-19

## 2024-03-07 ENCOUNTER — HOSPITAL ENCOUNTER (OUTPATIENT)
Dept: RADIOLOGY | Facility: MEDICAL CENTER | Age: 79
End: 2024-03-07
Attending: FAMILY MEDICINE
Payer: MEDICARE

## 2024-03-07 DIAGNOSIS — Z12.31 SCREENING MAMMOGRAM, ENCOUNTER FOR: ICD-10-CM

## 2024-03-07 PROCEDURE — 77067 SCR MAMMO BI INCL CAD: CPT

## 2024-03-11 DIAGNOSIS — N63.21 MASS OF UPPER OUTER QUADRANT OF LEFT BREAST: ICD-10-CM

## 2024-03-13 DIAGNOSIS — N63.21 MASS OF UPPER OUTER QUADRANT OF LEFT BREAST: ICD-10-CM

## 2024-03-18 ENCOUNTER — HOSPITAL ENCOUNTER (OUTPATIENT)
Dept: RADIOLOGY | Facility: MEDICAL CENTER | Age: 79
End: 2024-03-18
Attending: FAMILY MEDICINE
Payer: MEDICARE

## 2024-03-18 DIAGNOSIS — N63.21 MASS OF UPPER OUTER QUADRANT OF LEFT BREAST: ICD-10-CM

## 2024-03-18 PROCEDURE — 76642 ULTRASOUND BREAST LIMITED: CPT | Mod: LT

## 2024-03-19 ENCOUNTER — HOSPITAL ENCOUNTER (OUTPATIENT)
Dept: RADIOLOGY | Facility: MEDICAL CENTER | Age: 79
End: 2024-03-19
Attending: FAMILY MEDICINE
Payer: MEDICARE

## 2024-03-19 DIAGNOSIS — R92.8 ABNORMAL FINDINGS ON DIAGNOSTIC IMAGING OF BREAST: ICD-10-CM

## 2024-03-19 LAB — PATHOLOGY CONSULT NOTE: NORMAL

## 2024-03-19 PROCEDURE — 88360 TUMOR IMMUNOHISTOCHEM/MANUAL: CPT | Mod: 91

## 2024-03-19 PROCEDURE — A4648 IMPLANTABLE TISSUE MARKER: HCPCS | Mod: LT

## 2024-03-19 PROCEDURE — 88305 TISSUE EXAM BY PATHOLOGIST: CPT

## 2024-03-19 RX ORDER — CELECOXIB 200 MG/1
200 CAPSULE ORAL
Qty: 30 CAPSULE | Refills: 0 | Status: SHIPPED | OUTPATIENT
Start: 2024-03-19

## 2024-03-19 NOTE — TELEPHONE ENCOUNTER
Received request via: Pharmacy    Was the patient seen in the last year in this department? Yes    Does the patient have an active prescription (recently filled or refills available) for medication(s) requested? No    [facility]    Does the patient have [facility] Plus and need 100 day supply (blood pressure, diabetes and cholesterol meds only)? Medication is not for cholesterol, blood pressure or diabetes

## 2024-03-20 ENCOUNTER — TELEPHONE (OUTPATIENT)
Dept: RADIOLOGY | Facility: MEDICAL CENTER | Age: 79
End: 2024-03-20
Payer: MEDICARE

## 2024-03-20 DIAGNOSIS — C50.912 INVASIVE DUCTAL CARCINOMA OF BREAST, LEFT (HCC): ICD-10-CM

## 2024-03-20 NOTE — PROGRESS NOTES
Subjective     Kaylee Nazario is a 78 y.o. female who presents for evaluation of a new diagnosis of breast cancer.  She reports no breast symptoms prior to her mammogram.    Routine self breast exams: Yes  Breast pain: No   Nipple discharge: No   Skin changes: No   Masses: No   Contour/nipple changes: No   Previous breast biopsy or surgery: Yes (bilateral excisional biopsies in the 1980s, benign)    Age at menarche: 13  Age at menopause: 40s  Age at first birth: 21    Hormone replacement therapy: Yes (6 months or less)    Family history of cancer: PA kidney cancer in her 70s.    Lifetime (5-yr) breast cancer risk assessment calculation  Tyrer-Cuzick v7: 1.7 % (1.4 %)  Tyrer-Cuzick v8: 1.4 % (1.2 %)  Charito model: 3.5 % (2.1 %)    Imaging  Screening mammogram ([facility]) 2024: Left breast UOQ 0.8cm mass, BIRADS 0, density B.  Diagnostic ultrasound ([facility] [place]) 2024: Left 03:00 9cmFN 0.8cm solid hypoechoic irregular mass. Incidental cysts. Axilla unremarkable. BIRADS 4b.    Pathology  Left breast US-guided CNBx 2024: Invasive ductal carcinoma, grade 2, ER+ (>90%) ID+ (>90%) HER2 pending, Ki67 <10%.    Past Medical History   Past Medical History:   Diagnosis Date    Arthritis     Basal cell carcinoma of skin     Eunvbjwmd-Ynjinlr-Usooqn disease 2016    Idhmgboti-Yjbjdyi-Abclcx disease 2016    Buttock pain 2019    Cataract 10/2014    Left Eye IOL    Constipation 2017    Dyslipidemia 2016    Essential hypertension 2016    Hypertension     Hypothyroidism     IBD (inflammatory bowel disease)     Impaired fasting blood sugar     Osteopenia     Trigger finger, acquired 2012     ICD-10 transition    Type 2 diabetes mellitus with hyperglycemia, without long-term current use of insulin (HCC) 4/3/2023       Surgical History  Past Surgical History:   Procedure Laterality Date    PB MANIPULATN KNEE JT+ANESTHESIA Left 2022    Procedure: LEFT KNEE MANIPULATION;  Surgeon: Bean PARSONS  KD Escobar;  Location: [facility];  Service: Orthopedics    PB TOTAL KNEE ARTHROPLASTY Left 04/04/2022    Procedure: LEFT TOTAL KNEE ARTHROPLASTY;  Surgeon: Bean Escobar M.D.;  Location: [facility];  Service: Orthopedics    PB INCISE FINGER TENDON SHEATH Right 10/26/2021    Procedure: RIGHT THUMB TRIGGER RELEASE;  Surgeon: Brett Maddox M.D.;  Location: [facility];  Service: Orthopedics    KNEE ARTHROSCOPY Right 06/08/2016    Procedure: KNEE ARTHROSCOPY, MEDIAL MENISECTOMY, DEBRIEDMENT OF LATERAL  FEMORAL CONDYLE;  Surgeon: Real Carias M.D.;  Location: [facility];  Service:     LATERAL RELEASE Right 06/08/2016    Procedure: LATERAL RELEASE;  Surgeon: Real Carias M.D.;  Location: [facility];  Service:     TRIGGER FINGER RELEASE Bilateral 04/19/2016    Procedure: TRIGGER FINGER RELEASE-RING;  Surgeon: Brett Maddox M.D.;  Location: [facility];  Service:     TRIGGER FINGER RELEASE  12/26/2012    Performed by Brett Maddox M.D. at [facility]    COLONOSCOPY  08/01/2011    CATARACT EXTRACTION WITH IOL Left 03/01/2009    left    TUBAL LIGATION  09/01/1980    APPENDECTOMY      BREAST BIOPSY      multiple breast biopsies    KNEE REPLACEMENT, TOTAL Bilateral     LUMPECTOMY      TONSILLECTOMY  as child       Family History  Family History   Problem Relation Age of Onset    Other Mother         MULTI SYSTEM FAILURE    Stroke Father     Hyperlipidemia Brother     Other Brother         aortic aneurysm repair    Kidney cancer Maternal Aunt 70 - 79       Social History  Social History     Socioeconomic History    Marital status:      Spouse name: Not on file    Number of children: Not on file    Years of education: Not on file    Highest education level: Professional school degree (e.g., MD, DDS, DVM, WADE)   Occupational History    Occupation: psych nurse - [employer]   Tobacco Use     Smoking status: Never    Smokeless tobacco: Never   Vaping Use    Vaping Use: Never used   Substance and Sexual Activity    Alcohol use: No     Alcohol/week: 0.0 oz    Drug use: No    Sexual activity: Not Currently     Partners: Male     Comment:     Other Topics Concern    Not on file   Social History Narrative    Works as a psychiatric nurse.  Lives with her .  No social or domestic concerns.  No hearing aids not using any walking assistance or having any falls.  No major memory concerns.  No recent hospitalizations or surgeries.     Social Determinants of Health     Financial Resource Strain: Low Risk  (5/14/2023)    Overall Financial Resource Strain (CARDIA)     Difficulty of Paying Living Expenses: Not hard at all   Food Insecurity: No Food Insecurity (5/14/2023)    Hunger Vital Sign     Worried About Running Out of Food in the Last Year: Never true     Ran Out of Food in the Last Year: Never true   Transportation Needs: No Transportation Needs (5/14/2023)    PRAPARE - Transportation     Lack of Transportation (Medical): No     Lack of Transportation (Non-Medical): No   Physical Activity: Inactive (5/14/2023)    Exercise Vital Sign     Days of Exercise per Week: 0 days     Minutes of Exercise per Session: 0 min   Stress: No Stress Concern Present (5/14/2023)    [place] [employer] - Occupational Stress Questionnaire     Feeling of Stress : Not at all   Social Connections: Socially Integrated (5/14/2023)    Social Connection and Isolation Panel [NHANES]     Frequency of Communication with Friends and Family: Three times a week     Frequency of Social Gatherings with Friends and Family: Three times a week     Attends Mu-ism Services: More than 4 times per year     Active Member of Clubs or Organizations: Yes     Attends Club or Organization Meetings: More than 4 times per year     Marital Status:    Intimate Partner Violence: Not on file   Housing Stability: Low Risk   "(5/14/2023)    Housing Stability Vital Sign     Unable to Pay for Housing in the Last Year: No     Number of Places Lived in the Last Year: 1     Unstable Housing in the Last Year: No        Review of Systems  Review of Systems   Constitutional: Negative.    HENT:  Negative.     Eyes: Negative.    Respiratory: Negative.     Cardiovascular: Negative.    Gastrointestinal: Negative.    Endocrine: Negative.    Genitourinary: Negative.     Musculoskeletal: Negative.    Skin: Negative.    Neurological: Negative.    Hematological: Negative.    Psychiatric/Behavioral: Negative.          Objective   /78 (BP Location: Left arm, Patient Position: Sitting, BP Cuff Size: Large adult)   Pulse 83   Temp 36.6 °C (97.8 °F) (Temporal)   Ht 1.651 m (5' 5\")   Wt 72.6 kg (160 lb)   SpO2 94%   BMI 26.63 kg/m²    Physical Exam  Vitals and nursing note reviewed.   Constitutional:       General: She is not in acute distress.     Appearance: Normal appearance.   HENT:      Head: Normocephalic and atraumatic.      Right Ear: External ear normal.      Left Ear: External ear normal.      Nose: Nose normal.      Mouth/Throat:      Pharynx: Oropharynx is clear.   Eyes:      General: No scleral icterus.     Conjunctiva/sclera: Conjunctivae normal.   Cardiovascular:      Rate and Rhythm: Normal rate and regular rhythm.      Heart sounds: Normal heart sounds. No murmur heard.     No friction rub. No gallop.   Pulmonary:      Effort: Pulmonary effort is normal. No respiratory distress.      Breath sounds: Normal breath sounds. No wheezing, rhonchi or rales.   Chest:   Breasts:     Kevin Score is 5.      Breasts are symmetrical.      Right: Normal. No swelling, bleeding, inverted nipple, mass, nipple discharge or skin change.      Left: Normal. No swelling, bleeding, inverted nipple, mass, nipple discharge or skin change.      Comments: Bilateral breasts examined in the upright and supine positions.  No suspicious skin changes (erythema, " peau d'orange).  No unexpected contour abnormalities.  Bilateral breast tissue heterogeneously dense with no dominant masses or nodules; subtle thickening in the far lateral left breast consistent with biopsy change.  Bilateral nipples everted without expressible discharge.  No palpable cervical, supraclavicular, or axillary adenopathy bilaterally.  Bedside ultrasound performed with the GE 12mHz linear probe confirming the small hypoechoic mass and associated HydroMARK biopsy marker in the far lateral left breast.  Abdominal:      General: Abdomen is flat. There is no distension.      Palpations: Abdomen is soft. There is no mass.   Musculoskeletal:         General: No swelling or deformity. Normal range of motion.      Cervical back: Neck supple.   Lymphadenopathy:      Cervical: No cervical adenopathy.      Upper Body:      Right upper body: No supraclavicular or axillary adenopathy.      Left upper body: No supraclavicular or axillary adenopathy.   Skin:     General: Skin is warm and dry.      Capillary Refill: Capillary refill takes less than 2 seconds.   Neurological:      General: No focal deficit present.      Mental Status: She is alert and oriented to person, place, and time.   Psychiatric:         Mood and Affect: Mood normal.         Behavior: Behavior normal.         Thought Content: Thought content normal.         Judgment: Judgment normal.         Assessment & Plan   The patient is a delightful 78 y.o. female with a new diagnosis of left breast 03:00 invasive ductal carcinoma.  This is clinical stage cT1b N0 M0 (anatomic/prognostic stage IA), spanning 0.8 cm by mammogram and ultrasound, with clinically negative axillary lymph nodes.  The tumor is grade 2, estrogen receptor positive (>90 %), progesterone receptor positive (>90 %), HER2 pending, with a Ki67 of <10 %.     We discussed the development, progression, and natural history of untreated breast cancer, including the potential for progression to  metastatic disease and death.  Given her current stage of IA I think that this tumor is very treatable and her prognosis is very good.  We discussed medical oncology and radiation oncology treatments briefly, and how I expect them to be applied in her particular case.     We discussed surgical options, including mastectomy with or without reconstruction and segmental mastectomy (lumpectomy).  We discussed that there is no difference in survival between mastectomy and lumpectomy, but that there is an increased risk of local recurrence with lumpectomy for which we give radiation after surgery.  We discussed that radiation may confer a small survival benefit according to new data.  We discussed lymphatic sampling and the indications for sentinel lymph node biopsy, axillary dissection, and the scenarios in which lymphatic sampling may be omitted.  In her particular case, I recommend proceeding with breast conservation; we may consider omitting sentinel node biopsy due to her age and the favorable biomarkers.   All questions answered in detail.  A thorough discussion was held with the patient of the indications, alternatives, risks (including lymphedema, positive margins, bleeding, infection, anesthetic complications, and the potential need for more than one operation), as well as the expected outcomes.  She would like to proceed with surgery scheduling for left partial mastectomy.    Before proceeding with surgery, she will need preanesthesia testing.    We discussed the option of genetic counseling and genetic testing.  She does not meet NCCN criteria for genetic testing due to her age, lack of family history, and the favorable biomarkers.  At this point the patient is not interested in genetic counseling and testing.    A total of 75 minutes were spent on and with this patient today, including review of records, independent review of imaging, history and physical exam, counseling, documentation of exam, and  "coordination of care.  The patient was given a copy of her percutaneous biopsy pathology report, a filled-out \"Your Guide to Understanding the Diagnosis of Breast Cancer\" booklet adapted from the [email or web address].org version, and a personalized flight plan including her diagnosis, imaging, and detailed plan.  Referrals were placed to cancer navigation and social work/distress screening.   "

## 2024-03-21 ENCOUNTER — OFFICE VISIT (OUTPATIENT)
Dept: SURGERY | Facility: MEDICAL CENTER | Age: 79
End: 2024-03-21
Payer: MEDICARE

## 2024-03-21 VITALS
TEMPERATURE: 97.8 F | DIASTOLIC BLOOD PRESSURE: 78 MMHG | HEART RATE: 83 BPM | BODY MASS INDEX: 26.66 KG/M2 | WEIGHT: 160 LBS | HEIGHT: 65 IN | OXYGEN SATURATION: 94 % | SYSTOLIC BLOOD PRESSURE: 128 MMHG

## 2024-03-21 DIAGNOSIS — C50.812 MALIGNANT NEOPLASM OF OVERLAPPING SITES OF LEFT BREAST IN FEMALE, ESTROGEN RECEPTOR POSITIVE (HCC): ICD-10-CM

## 2024-03-21 DIAGNOSIS — Z17.0 MALIGNANT NEOPLASM OF OVERLAPPING SITES OF LEFT BREAST IN FEMALE, ESTROGEN RECEPTOR POSITIVE (HCC): ICD-10-CM

## 2024-03-21 PROCEDURE — 1170F FXNL STATUS ASSESSED: CPT | Performed by: SURGERY

## 2024-03-21 PROCEDURE — 3078F DIAST BP <80 MM HG: CPT | Performed by: SURGERY

## 2024-03-21 PROCEDURE — 99205 OFFICE O/P NEW HI 60 MIN: CPT | Performed by: SURGERY

## 2024-03-21 PROCEDURE — 3074F SYST BP LT 130 MM HG: CPT | Performed by: SURGERY

## 2024-03-21 ASSESSMENT — ENCOUNTER SYMPTOMS
ENDOCRINE NEGATIVE: 1
RESPIRATORY NEGATIVE: 1
HEMATOLOGIC/LYMPHATIC NEGATIVE: 1
CONSTITUTIONAL NEGATIVE: 1
MUSCULOSKELETAL NEGATIVE: 1
EYES NEGATIVE: 1
PSYCHIATRIC NEGATIVE: 1
GASTROINTESTINAL NEGATIVE: 1
NEUROLOGICAL NEGATIVE: 1
CARDIOVASCULAR NEGATIVE: 1

## 2024-03-25 ENCOUNTER — APPOINTMENT (OUTPATIENT)
Dept: ADMISSIONS | Facility: MEDICAL CENTER | Age: 79
End: 2024-03-25
Attending: SURGERY
Payer: MEDICARE

## 2024-03-30 ENCOUNTER — PATIENT OUTREACH (OUTPATIENT)
Dept: ONCOLOGY | Facility: MEDICAL CENTER | Age: 79
End: 2024-03-30
Payer: MEDICARE

## 2024-03-31 NOTE — PROGRESS NOTES
"ONCOLOGY NURSE NAVIGATION(ONN) ASSESSMENT:  GISELE Pb reached out to follow up on provider referral.  NN introduced myself and my role.  NN explained I am here to provide support, education, guidance, and to assist with any barriers to care.    NN reviewed the patient's chart & upcoming appointments.   Barriers were reviewed and documented below.  Distress screening   Patient shared \"                   .\"    Patient's support team is her                      ONN explained we have many support services for patients & family.  [facility] services are listed on the calendar and community resources are available through our Oncology Social Worker.  Patient verbalized understanding of information discussed, and will call with future questions or concerns.             BARRIERS ASSESSMENT:  TRANSPORTATION:    EMPLOYMENT:         FINANCIAL:    INSURANCE:   SUPPORT SYSTEM:    PSYCHOLOGICAL:    COMMUNICATION:     FAMILY CARE:     SELF CARE:         INTERVENTION:  Introduction letter, calendar and educational flyers sent to patient's personal email today.     "

## 2024-04-01 ENCOUNTER — TELEPHONE (OUTPATIENT)
Dept: HEALTH INFORMATION MANAGEMENT | Facility: OTHER | Age: 79
End: 2024-04-01

## 2024-04-03 ENCOUNTER — APPOINTMENT (OUTPATIENT)
Dept: ADMISSIONS | Facility: MEDICAL CENTER | Age: 79
End: 2024-04-03
Attending: SURGERY
Payer: MEDICARE

## 2024-04-03 RX ORDER — CALCIUM CARBONATE/VITAMIN D3 500 MG-10
TABLET,CHEWABLE ORAL NIGHTLY
COMMUNITY

## 2024-04-03 RX ORDER — ERGOCALCIFEROL 1.25 MG/1
CAPSULE ORAL DAILY
COMMUNITY
End: 2024-04-30

## 2024-04-10 ENCOUNTER — PRE-ADMISSION TESTING (OUTPATIENT)
Dept: ADMISSIONS | Facility: MEDICAL CENTER | Age: 79
End: 2024-04-10
Attending: SURGERY
Payer: MEDICARE

## 2024-04-10 DIAGNOSIS — Z01.810 PRE-PROCEDURAL CARDIOVASCULAR EXAMINATION: ICD-10-CM

## 2024-04-10 DIAGNOSIS — Z01.812 PRE-PROCEDURAL LABORATORY EXAMINATION: ICD-10-CM

## 2024-04-10 LAB
ANION GAP SERPL CALC-SCNC: 12 MMOL/L (ref 7–16)
BASOPHILS # BLD AUTO: 0.9 % (ref 0–1.8)
BASOPHILS # BLD: 0.07 K/UL (ref 0–0.12)
BUN SERPL-MCNC: 24 MG/DL (ref 8–22)
CALCIUM SERPL-MCNC: 9.7 MG/DL (ref 8.5–10.5)
CHLORIDE SERPL-SCNC: 107 MMOL/L (ref 96–112)
CO2 SERPL-SCNC: 25 MMOL/L (ref 20–33)
CREAT SERPL-MCNC: 0.63 MG/DL (ref 0.5–1.4)
EKG IMPRESSION: NORMAL
EOSINOPHIL # BLD AUTO: 0.43 K/UL (ref 0–0.51)
EOSINOPHIL NFR BLD: 5.6 % (ref 0–6.9)
ERYTHROCYTE [DISTWIDTH] IN BLOOD BY AUTOMATED COUNT: 43.7 FL (ref 35.9–50)
EST. AVERAGE GLUCOSE BLD GHB EST-MCNC: 131 MG/DL
GFR SERPLBLD CREATININE-BSD FMLA CKD-EPI: 90 ML/MIN/1.73 M 2
GLUCOSE SERPL-MCNC: 106 MG/DL (ref 65–99)
HBA1C MFR BLD: 6.2 % (ref 4–5.6)
HCT VFR BLD AUTO: 41.7 % (ref 37–47)
HGB BLD-MCNC: 13.9 G/DL (ref 12–16)
IMM GRANULOCYTES # BLD AUTO: 0.02 K/UL (ref 0–0.11)
IMM GRANULOCYTES NFR BLD AUTO: 0.3 % (ref 0–0.9)
LYMPHOCYTES # BLD AUTO: 2.21 K/UL (ref 1–4.8)
LYMPHOCYTES NFR BLD: 28.7 % (ref 22–41)
MCH RBC QN AUTO: 31.2 PG (ref 27–33)
MCHC RBC AUTO-ENTMCNC: 33.3 G/DL (ref 32.2–35.5)
MCV RBC AUTO: 93.7 FL (ref 81.4–97.8)
MONOCYTES # BLD AUTO: 0.55 K/UL (ref 0–0.85)
MONOCYTES NFR BLD AUTO: 7.1 % (ref 0–13.4)
NEUTROPHILS # BLD AUTO: 4.42 K/UL (ref 1.82–7.42)
NEUTROPHILS NFR BLD: 57.4 % (ref 44–72)
NRBC # BLD AUTO: 0 K/UL
NRBC BLD-RTO: 0 /100 WBC (ref 0–0.2)
PLATELET # BLD AUTO: 298 K/UL (ref 164–446)
PMV BLD AUTO: 11.4 FL (ref 9–12.9)
POTASSIUM SERPL-SCNC: 4.4 MMOL/L (ref 3.6–5.5)
RBC # BLD AUTO: 4.45 M/UL (ref 4.2–5.4)
SODIUM SERPL-SCNC: 144 MMOL/L (ref 135–145)
WBC # BLD AUTO: 7.7 K/UL (ref 4.8–10.8)

## 2024-04-10 PROCEDURE — 36415 COLL VENOUS BLD VENIPUNCTURE: CPT

## 2024-04-10 PROCEDURE — 93005 ELECTROCARDIOGRAM TRACING: CPT

## 2024-04-10 PROCEDURE — 80048 BASIC METABOLIC PNL TOTAL CA: CPT

## 2024-04-10 PROCEDURE — 93010 ELECTROCARDIOGRAM REPORT: CPT | Performed by: INTERNAL MEDICINE

## 2024-04-10 PROCEDURE — 85025 COMPLETE CBC W/AUTO DIFF WBC: CPT

## 2024-04-10 PROCEDURE — 83036 HEMOGLOBIN GLYCOSYLATED A1C: CPT

## 2024-04-17 ENCOUNTER — PATIENT MESSAGE (OUTPATIENT)
Dept: MEDICAL GROUP | Facility: PHYSICIAN GROUP | Age: 79
End: 2024-04-17
Payer: MEDICARE

## 2024-04-17 NOTE — PATIENT COMMUNICATION
Received request via: Patient    Was the patient seen in the last year in this department? Yes    Does the patient have an active prescription (recently filled or refills available) for medication(s) requested? No    [facility]    Does the patient have half-way Plus and need 100 day supply (blood pressure, diabetes and cholesterol meds only)? Medication is not for cholesterol, blood pressure or diabetes

## 2024-04-18 RX ORDER — CELECOXIB 200 MG/1
200 CAPSULE ORAL
Qty: 30 CAPSULE | Refills: 3 | Status: SHIPPED | OUTPATIENT
Start: 2024-04-18

## 2024-04-19 ENCOUNTER — APPOINTMENT (OUTPATIENT)
Dept: RADIOLOGY | Facility: MEDICAL CENTER | Age: 79
End: 2024-04-19
Attending: SURGERY
Payer: MEDICARE

## 2024-04-19 ENCOUNTER — ANESTHESIA EVENT (OUTPATIENT)
Dept: SURGERY | Facility: MEDICAL CENTER | Age: 79
End: 2024-04-19
Payer: MEDICARE

## 2024-04-19 ENCOUNTER — ANESTHESIA (OUTPATIENT)
Dept: SURGERY | Facility: MEDICAL CENTER | Age: 79
End: 2024-04-19
Payer: MEDICARE

## 2024-04-19 ENCOUNTER — HOSPITAL ENCOUNTER (OUTPATIENT)
Facility: MEDICAL CENTER | Age: 79
End: 2024-04-19
Attending: SURGERY | Admitting: SURGERY
Payer: MEDICARE

## 2024-04-19 VITALS
BODY MASS INDEX: 26.48 KG/M2 | OXYGEN SATURATION: 95 % | RESPIRATION RATE: 16 BRPM | TEMPERATURE: 97.6 F | WEIGHT: 158.95 LBS | DIASTOLIC BLOOD PRESSURE: 66 MMHG | HEIGHT: 65 IN | HEART RATE: 79 BPM | SYSTOLIC BLOOD PRESSURE: 144 MMHG

## 2024-04-19 DIAGNOSIS — C50.812 MALIGNANT NEOPLASM OF OVERLAPPING SITES OF LEFT FEMALE BREAST, UNSPECIFIED ESTROGEN RECEPTOR STATUS (HCC): ICD-10-CM

## 2024-04-19 LAB — PATHOLOGY CONSULT NOTE: NORMAL

## 2024-04-19 PROCEDURE — A4648 IMPLANTABLE TISSUE MARKER: HCPCS | Performed by: SURGERY

## 2024-04-19 PROCEDURE — 160039 HCHG SURGERY MINUTES - EA ADDL 1 MIN LEVEL 3: Performed by: SURGERY

## 2024-04-19 PROCEDURE — 160048 HCHG OR STATISTICAL LEVEL 1-5: Performed by: SURGERY

## 2024-04-19 PROCEDURE — 160047 HCHG PACU  - EA ADDL 30 MINS PHASE II: Performed by: SURGERY

## 2024-04-19 PROCEDURE — 160025 RECOVERY II MINUTES (STATS): Performed by: SURGERY

## 2024-04-19 PROCEDURE — 19301 PARTIAL MASTECTOMY: CPT | Mod: AS,LT | Performed by: SPECIALIST

## 2024-04-19 PROCEDURE — 160009 HCHG ANES TIME/MIN: Performed by: SURGERY

## 2024-04-19 PROCEDURE — 700105 HCHG RX REV CODE 258: Performed by: ANESTHESIOLOGY

## 2024-04-19 PROCEDURE — 160035 HCHG PACU - 1ST 60 MINS PHASE I: Performed by: SURGERY

## 2024-04-19 PROCEDURE — 160028 HCHG SURGERY MINUTES - 1ST 30 MINS LEVEL 3: Performed by: SURGERY

## 2024-04-19 PROCEDURE — 700102 HCHG RX REV CODE 250 W/ 637 OVERRIDE(OP): Performed by: ANESTHESIOLOGY

## 2024-04-19 PROCEDURE — A9270 NON-COVERED ITEM OR SERVICE: HCPCS | Performed by: ANESTHESIOLOGY

## 2024-04-19 PROCEDURE — 76098 X-RAY EXAM SURGICAL SPECIMEN: CPT | Mod: LT

## 2024-04-19 PROCEDURE — 160046 HCHG PACU - 1ST 60 MINS PHASE II: Performed by: SURGERY

## 2024-04-19 PROCEDURE — 88307 TISSUE EXAM BY PATHOLOGIST: CPT | Mod: 59

## 2024-04-19 PROCEDURE — 700111 HCHG RX REV CODE 636 W/ 250 OVERRIDE (IP): Performed by: ANESTHESIOLOGY

## 2024-04-19 PROCEDURE — 160002 HCHG RECOVERY MINUTES (STAT): Performed by: SURGERY

## 2024-04-19 PROCEDURE — 700111 HCHG RX REV CODE 636 W/ 250 OVERRIDE (IP): Performed by: SURGERY

## 2024-04-19 PROCEDURE — 700101 HCHG RX REV CODE 250: Performed by: ANESTHESIOLOGY

## 2024-04-19 RX ORDER — DIPHENHYDRAMINE HCL 25 MG
25 TABLET ORAL EVERY 6 HOURS PRN
Status: DISCONTINUED | OUTPATIENT
Start: 2024-04-19 | End: 2024-04-19 | Stop reason: HOSPADM

## 2024-04-19 RX ORDER — SODIUM CHLORIDE, SODIUM LACTATE, POTASSIUM CHLORIDE, CALCIUM CHLORIDE 600; 310; 30; 20 MG/100ML; MG/100ML; MG/100ML; MG/100ML
INJECTION, SOLUTION INTRAVENOUS
Status: DISCONTINUED | OUTPATIENT
Start: 2024-04-19 | End: 2024-04-19 | Stop reason: SURG

## 2024-04-19 RX ORDER — DIPHENHYDRAMINE HYDROCHLORIDE 50 MG/ML
25 INJECTION INTRAMUSCULAR; INTRAVENOUS EVERY 6 HOURS PRN
Status: DISCONTINUED | OUTPATIENT
Start: 2024-04-19 | End: 2024-04-19 | Stop reason: HOSPADM

## 2024-04-19 RX ORDER — ONDANSETRON 2 MG/ML
INJECTION INTRAMUSCULAR; INTRAVENOUS PRN
Status: DISCONTINUED | OUTPATIENT
Start: 2024-04-19 | End: 2024-04-19 | Stop reason: SURG

## 2024-04-19 RX ORDER — BUPIVACAINE HYDROCHLORIDE 2.5 MG/ML
INJECTION, SOLUTION EPIDURAL; INFILTRATION; INTRACAUDAL
Status: DISCONTINUED | OUTPATIENT
Start: 2024-04-19 | End: 2024-04-19 | Stop reason: HOSPADM

## 2024-04-19 RX ORDER — HYDROMORPHONE HYDROCHLORIDE 1 MG/ML
0.1 INJECTION, SOLUTION INTRAMUSCULAR; INTRAVENOUS; SUBCUTANEOUS
Status: DISCONTINUED | OUTPATIENT
Start: 2024-04-19 | End: 2024-04-19 | Stop reason: HOSPADM

## 2024-04-19 RX ORDER — CEFAZOLIN SODIUM 1 G/3ML
INJECTION, POWDER, FOR SOLUTION INTRAMUSCULAR; INTRAVENOUS PRN
Status: DISCONTINUED | OUTPATIENT
Start: 2024-04-19 | End: 2024-04-19 | Stop reason: SURG

## 2024-04-19 RX ORDER — HYDROMORPHONE HYDROCHLORIDE 1 MG/ML
0.2 INJECTION, SOLUTION INTRAMUSCULAR; INTRAVENOUS; SUBCUTANEOUS
Status: DISCONTINUED | OUTPATIENT
Start: 2024-04-19 | End: 2024-04-19 | Stop reason: HOSPADM

## 2024-04-19 RX ORDER — LABETALOL HYDROCHLORIDE 5 MG/ML
5 INJECTION, SOLUTION INTRAVENOUS
Status: DISCONTINUED | OUTPATIENT
Start: 2024-04-19 | End: 2024-04-19 | Stop reason: HOSPADM

## 2024-04-19 RX ORDER — EPHEDRINE SULFATE 50 MG/ML
5 INJECTION, SOLUTION INTRAVENOUS
Status: DISCONTINUED | OUTPATIENT
Start: 2024-04-19 | End: 2024-04-19 | Stop reason: HOSPADM

## 2024-04-19 RX ORDER — OXYCODONE HCL 5 MG/5 ML
10 SOLUTION, ORAL ORAL
Status: DISCONTINUED | OUTPATIENT
Start: 2024-04-19 | End: 2024-04-19 | Stop reason: HOSPADM

## 2024-04-19 RX ORDER — SODIUM CHLORIDE, SODIUM LACTATE, POTASSIUM CHLORIDE, CALCIUM CHLORIDE 600; 310; 30; 20 MG/100ML; MG/100ML; MG/100ML; MG/100ML
INJECTION, SOLUTION INTRAVENOUS CONTINUOUS
Status: CANCELLED | OUTPATIENT
Start: 2024-04-19

## 2024-04-19 RX ORDER — HYDRALAZINE HYDROCHLORIDE 20 MG/ML
5 INJECTION INTRAMUSCULAR; INTRAVENOUS
Status: DISCONTINUED | OUTPATIENT
Start: 2024-04-19 | End: 2024-04-19 | Stop reason: HOSPADM

## 2024-04-19 RX ORDER — DIPHENHYDRAMINE HYDROCHLORIDE 50 MG/ML
12.5 INJECTION INTRAMUSCULAR; INTRAVENOUS
Status: DISCONTINUED | OUTPATIENT
Start: 2024-04-19 | End: 2024-04-19 | Stop reason: HOSPADM

## 2024-04-19 RX ORDER — MEPERIDINE HYDROCHLORIDE 25 MG/ML
12.5 INJECTION INTRAMUSCULAR; INTRAVENOUS; SUBCUTANEOUS
Status: DISCONTINUED | OUTPATIENT
Start: 2024-04-19 | End: 2024-04-19 | Stop reason: HOSPADM

## 2024-04-19 RX ORDER — HYDROMORPHONE HYDROCHLORIDE 1 MG/ML
0.4 INJECTION, SOLUTION INTRAMUSCULAR; INTRAVENOUS; SUBCUTANEOUS
Status: DISCONTINUED | OUTPATIENT
Start: 2024-04-19 | End: 2024-04-19 | Stop reason: HOSPADM

## 2024-04-19 RX ORDER — METOPROLOL TARTRATE 1 MG/ML
1 INJECTION, SOLUTION INTRAVENOUS
Status: DISCONTINUED | OUTPATIENT
Start: 2024-04-19 | End: 2024-04-19 | Stop reason: HOSPADM

## 2024-04-19 RX ORDER — ONDANSETRON 2 MG/ML
4 INJECTION INTRAMUSCULAR; INTRAVENOUS
Status: DISCONTINUED | OUTPATIENT
Start: 2024-04-19 | End: 2024-04-19 | Stop reason: HOSPADM

## 2024-04-19 RX ORDER — DEXAMETHASONE SODIUM PHOSPHATE 4 MG/ML
INJECTION, SOLUTION INTRA-ARTICULAR; INTRALESIONAL; INTRAMUSCULAR; INTRAVENOUS; SOFT TISSUE PRN
Status: DISCONTINUED | OUTPATIENT
Start: 2024-04-19 | End: 2024-04-19 | Stop reason: SURG

## 2024-04-19 RX ORDER — IPRATROPIUM BROMIDE AND ALBUTEROL SULFATE 2.5; .5 MG/3ML; MG/3ML
3 SOLUTION RESPIRATORY (INHALATION)
Status: DISCONTINUED | OUTPATIENT
Start: 2024-04-19 | End: 2024-04-19 | Stop reason: HOSPADM

## 2024-04-19 RX ORDER — EPHEDRINE SULFATE 50 MG/ML
INJECTION, SOLUTION INTRAVENOUS PRN
Status: DISCONTINUED | OUTPATIENT
Start: 2024-04-19 | End: 2024-04-19 | Stop reason: SURG

## 2024-04-19 RX ORDER — OXYCODONE HCL 5 MG/5 ML
5 SOLUTION, ORAL ORAL
Status: DISCONTINUED | OUTPATIENT
Start: 2024-04-19 | End: 2024-04-19 | Stop reason: HOSPADM

## 2024-04-19 RX ORDER — HALOPERIDOL 5 MG/ML
1 INJECTION INTRAMUSCULAR
Status: DISCONTINUED | OUTPATIENT
Start: 2024-04-19 | End: 2024-04-19 | Stop reason: HOSPADM

## 2024-04-19 RX ORDER — LIDOCAINE HYDROCHLORIDE 20 MG/ML
INJECTION, SOLUTION EPIDURAL; INFILTRATION; INTRACAUDAL; PERINEURAL PRN
Status: DISCONTINUED | OUTPATIENT
Start: 2024-04-19 | End: 2024-04-19 | Stop reason: SURG

## 2024-04-19 RX ORDER — OXYCODONE HCL 5 MG/5 ML
5 SOLUTION, ORAL ORAL
Status: COMPLETED | OUTPATIENT
Start: 2024-04-19 | End: 2024-04-19

## 2024-04-19 RX ORDER — LIDOCAINE HYDROCHLORIDE 10 MG/ML
INJECTION, SOLUTION EPIDURAL; INFILTRATION; INTRACAUDAL; PERINEURAL
Status: DISCONTINUED
Start: 2024-04-19 | End: 2024-04-19 | Stop reason: HOSPADM

## 2024-04-19 RX ORDER — LIDOCAINE HYDROCHLORIDE 10 MG/ML
INJECTION, SOLUTION EPIDURAL; INFILTRATION; INTRACAUDAL; PERINEURAL
Status: DISCONTINUED | OUTPATIENT
Start: 2024-04-19 | End: 2024-04-19 | Stop reason: HOSPADM

## 2024-04-19 RX ADMIN — SODIUM CHLORIDE, POTASSIUM CHLORIDE, SODIUM LACTATE AND CALCIUM CHLORIDE: 600; 310; 30; 20 INJECTION, SOLUTION INTRAVENOUS at 08:50

## 2024-04-19 RX ADMIN — LIDOCAINE HYDROCHLORIDE 50 MG: 20 INJECTION, SOLUTION EPIDURAL; INFILTRATION; INTRACAUDAL at 08:58

## 2024-04-19 RX ADMIN — CEFAZOLIN 2 G: 1 INJECTION, POWDER, FOR SOLUTION INTRAMUSCULAR; INTRAVENOUS at 09:02

## 2024-04-19 RX ADMIN — ONDANSETRON 4 MG: 2 INJECTION INTRAMUSCULAR; INTRAVENOUS at 09:36

## 2024-04-19 RX ADMIN — EPHEDRINE SULFATE 20 MG: 50 INJECTION, SOLUTION INTRAVENOUS at 09:10

## 2024-04-19 RX ADMIN — FENTANYL CITRATE 100 MCG: 50 INJECTION, SOLUTION INTRAMUSCULAR; INTRAVENOUS at 08:52

## 2024-04-19 RX ADMIN — DEXAMETHASONE SODIUM PHOSPHATE 8 MG: 4 INJECTION INTRA-ARTICULAR; INTRALESIONAL; INTRAMUSCULAR; INTRAVENOUS; SOFT TISSUE at 09:36

## 2024-04-19 RX ADMIN — OXYCODONE HYDROCHLORIDE 5 MG: 5 SOLUTION ORAL at 10:49

## 2024-04-19 RX ADMIN — PROPOFOL 100 MG: 10 INJECTION, EMULSION INTRAVENOUS at 08:58

## 2024-04-19 ASSESSMENT — PAIN SCALES - GENERAL: PAIN_LEVEL: 1

## 2024-04-19 ASSESSMENT — PAIN DESCRIPTION - PAIN TYPE
TYPE: SURGICAL PAIN
TYPE: SURGICAL PAIN

## 2024-04-19 ASSESSMENT — FIBROSIS 4 INDEX: FIB4 SCORE: 1.3

## 2024-04-19 NOTE — ANESTHESIA POSTPROCEDURE EVALUATION
Patient: Kaylee Nazario    Procedure Summary       Date: 04/19/24 Room / Location: [facility] ROOM 25 / SURGERY SAME DAY [facility]    Anesthesia Start: 0850 Anesthesia Stop: 0957    Procedure: LEFT PARTIAL MASTECTOMY (Left: Breast) Diagnosis: (Malignant neoplasm of overlapping sites of left female breast)    Surgeons: Meme Altamirano M.D. Responsible Provider: Michel Conroy M.D.    Anesthesia Type: general ASA Status: 2            Final Anesthesia Type: general  Last vitals  BP   Blood Pressure : (!) 142/61    Temp   36.1 °C (96.9 °F)    Pulse   73   Resp   19    SpO2   97 %      Anesthesia Post Evaluation    Patient location during evaluation: PACU  Patient participation: complete - patient participated  Level of consciousness: awake and alert  Pain score: 1    Airway patency: patent  Anesthetic complications: no  Cardiovascular status: hemodynamically stable  Respiratory status: acceptable  Hydration status: euvolemic    PONV: none          There were no known notable events for this encounter.     Nurse Pain Score: 9 (NPRS)

## 2024-04-19 NOTE — DISCHARGE INSTRUCTIONS
Postoperative Instructions: Lumpectomy or Excisional Biopsy    Medications  Pain medication: I recommend acetaminophen (Tylenol) 500-1000 mg every 6 hours around the clock.  You may also use ibuprofen (Advil, Motrin) 400 mg every 6 hours around the clock as needed.  These may be taken together or at alternating times (for example, Tylenol at 3:00 and 9:00, ibuprofen at 6:00 and 12:00).  Ice pack: use on and off for the first 72 hours.  Do not apply the ice pack directly onto your skin or for longer than 20 minutes at a time.  If the acetaminophen, ibuprofen, and ice packs are insufficient to make the surgical pain tolerable, take the additional pain medication prescribed, or call Dr Cox.  Bowel medication to prevent constipation: Miralax 1 scoop (17 grams) up to three times daily if taking prescription pain medication or if constipated after surgery.  Wound care  A small amount of minor bleeding, bruising, and swelling is expected.  If bleeding persists, apply steady pressure for 10 minutes.  Call if it does not stop after 10 minutes or if bruising is extensive.  Leave Steri-Strips on skin; we will remove them at your postoperative visit.  Wear the surgical bra or a sports bra day and night for the first 14 days after surgery.  OK to remove to shower starting 2 days after surgery.  Call if you develop a fever above 101° Fahrenheit or 38° Centigrade.  Redness will develop around the wound(s) within 36 hours and then discoloration will turn into purple bruising and yellow-green healing.  This is normal.  Increasing swelling and pain is not normal after 4 days.  Please call if after 4 days the wound is increasing in swelling and pain, or if there is spreading redness.  A bump or “healing ridge” will develop in the wound.  This may feel firm for up to 3-4 months and will go away slowly.  Shower  OK to shower 48 hours after surgery.  Do not scrub the incision! OK to let soapy water run over it and then pat dry.  OK  for Steri-Strips to get wet.  Diet  Resume your usual diet after surgery.  Do not drink alcoholic beverages today or while taking pain medication.  Activity  Do not drive, operate machinery, sign legal documents, or take responsibility for another person for at least 24 hours or while taking pain medication.  Please walk daily after surgery when you're steady on your feet; OK to go up and down stairs slowly.  No strenuous exercise, heavy lifting/pushing/pulling, or bouncing activities for at least 14 days.  Anesthesia  After surgery, due to the equipment used by anesthesia it is normal to have a slight sore throat and hoarseness for 48-72 hours.  The local anesthetic used in the incision and nerve block will wear off after ~12-18 hours, so it is normal to have increased pain at that point.  Additional questions  If you need further assistance during business hours, call the [facility] at .  If you need further assistance outside business hours, call Dr Cox's cell phone at .       What to Expect Post Anesthesia    Rest and take it easy for the first 24 hours.  A responsible adult is recommended to remain with you during that time.  It is normal to feel sleepy.  We encourage you to not do anything that requires balance, judgment or coordination.    FOR 24 HOURS DO NOT:  Drive, operate machinery or run household appliances.  Drink beer or alcoholic beverages.  Make important decisions or sign legal documents.      MILD FLU-LIKE SYMPTOMS ARE NORMAL.  YOU MAY EXPERIENCE GENERALIZED MUSCLE ACHES, THROAT IRRITATION, HEADACHE AND/OR SOME NAUSEA.

## 2024-04-19 NOTE — OP REPORT
Patient Name: Kaylee Nazario   Medical Record Number: [id number]   Date of Service: 04/19/2024    Surgeon: Meme Peres MD [facility]  Assistant: Quynh Echevarria PA-C    Operation:   left breast ultrasound-guided wire localization  left breast wire-localized partial mastectomy  Interpretation of intraoperative specimen radiograph    Preoperative Diagnosis: Left breast 03:00 invasive ductal carcinoma, grade 2, ER+NJ+HER2-, Ki67 <10%, [id number] (anatomic/prognostic stage IA)  Postoperative Diagnosis: Same    Anesthesia: General anesthesia via laryngeal mask airway    Estimated Blood Loss: 5 mL      Complications: none    Operative Findings: Intraoperative specimen radiograph demonstrated excision and capture of the lesion, biopsy marker, and localization wire, with a close superomedial margin.  Therefore a shave margin was taken in this direction.    Indications: Kaylee Nazario is a 78 y.o. female who was diagnosed with left breast cancer on 03/19/2024 via ultrasound-guided core needle biopsy.  A thorough discussion of the indications, alternatives, risks, and benefits to partial mastectomy was held with the patient.  All questions were answered in detail.  Informed consent was signed and placed in the chart.    Description of Operation:  The patient was brought into the operating room and placed supine on the operating table.  Preoperative antibiotics were administered and sequential compression devices were placed for venous thromboembolic prophylaxis.  Smooth General anesthesia via laryngeal mask airway was induced.  Once the patient was resting comfortably the 13 MHz linear ultrasound probe was used to identify the hypoechoic irregular mass with associated HydroMARK biopsy marker in the left breast at the 3:00 position, 9 cm from the nipple.  This was clearly marked on the skin.  A preoperative timeout was performed confirming the patient's identity and the nature and location of the operation to be  performed.    The left breast was prepped with alcohol.  Under direct ultrasound visualization a 5 cm Kopans wire was introduced using a modified Seldinger technique from a lateral approach to lie immediately adjacent to the biopsy marker.  The wire was then trimmed.    The breast was then prepped and draped in the usual sterile fashion using chlorhexidine.  The planned incision was marked on the skin in a radial elliptical fashion including the skin directly overlying the malignancy.  The planned incision and underlying breast tissue were anesthetized with a 1:1 mixture of 1% lidocaine plain and 0.25% bupivicaine plain as a field block.  The incision was then made sharply and continued into the subcutaneous tissue with electrocautery.  The Kopans wire was included in the excised skin.  The wire was traced down and the targeted breast tissue was dissected free from the surrounding breast tissue using electrocautery; the posterior margin included the clavipectoral fascia.  The specimen was then oriented using CorrectClips and was submitted for specimen radiograph.  This demonstrated capture of the lesion, biopsy marker, and localization wire in their entirety.  Radiographic margins appeared adequate in all orientations on two orthogonal views but closest in the superomedial aspect.  The specimen was then submitted to pathology.  A shave margin was taken of the superomedial cavity wall with electrocautery and was oriented with a suture marking the inside wall.  The margin was also submitted to pathology.  The incision was inspected for hemostasis, which was achieved with electrocautery.  The wound was then irrigated thoroughly with warm sterile saline; the effluent was clear.  The boundaries of the excision cavity were marked with titanium hemoclips.  The breast tissue was then gently reapproximated using interrupted 2-0 Vicryl sutures.  The skin was closed with interrupted 3-0 Monocryl deep dermal sutures and a 4-0  Monocryl running subcuticular suture.  A sterile dressing was applied.    The patient was then awakened from General anesthesia via laryngeal mask airway and was taken to the postanesthesia care unit in stable condition.  At the conclusion of the operation all sponge, needle, and instrument counts were reported correct x2 by the nursing staff.  Estimated blood loss was 5 mL.  The patient tolerated the procedure well with no immediate complications.  It is anticipated that the patient will be able to be discharged to home when PACU discharge criteria are met.

## 2024-04-19 NOTE — OR NURSING
1130 patient recovered well in PACU, s/p left partial mastectomy with Dr. White surgical site dressings include steri-strips, puff pad, abd pad and breast binder, clean/dry/intact throughout PACU stay, medicated per EMAR for pain, able to achieve tolerable pain level on dc, denies nausea and able to tolerate oral intake, dc instructions discussed with patient and patient's spouse Leo, questions answered, piv dc tip intact, all belongings with patient and accounted for, escorted out via wheelchair with this RN

## 2024-04-19 NOTE — ANESTHESIA PROCEDURE NOTES
Airway    Date/Time: 4/19/2024 9:00 AM    Performed by: Michel Conroy M.D.  Authorized by: Michel Conroy M.D.    Location:  OR  Urgency:  Elective  Indications for Airway Management:  Anesthesia      Spontaneous Ventilation: absent    Sedation Level:  Deep  Preoxygenated: Yes    Patient Position:  Sniffing  Mask Difficulty Assessment:  1 - vent by mask  Final Airway Type:  Supraglottic airway  Final Supraglottic Airway:  Standard LMA    SGA Size:  4  Number of Attempts at Approach:  1

## 2024-04-19 NOTE — ANESTHESIA TIME REPORT
Anesthesia Start and Stop Event Times       Date Time Event    4/19/2024 0847 Ready for Procedure     0850 Anesthesia Start     0957 Anesthesia Stop          Responsible Staff  04/19/24      Name Role Begin End    Michel Conroy M.D. Anesth 0850 0957          Overtime Reason:  no overtime (within assigned shift)    Comments:

## 2024-04-19 NOTE — ANESTHESIA PREPROCEDURE EVALUATION
Case: [id number] Date/Time: 04/19/24 0845    Procedure: LEFT PARTIAL MASTECTOMY    Pre-op diagnosis: C50.812    Location: [facility] ROOM 25 / SURGERY SAME DAY [facility]    Surgeons: Meme Altamirano M.D.            Relevant Problems   CARDIAC   (positive) Primary hypertension      ENDO   (positive) Hypothyroidism   (positive) Type 2 diabetes mellitus without complication, without long-term current use of insulin (HCC)       Physical Exam    Airway   Mallampati: II  TM distance: >3 FB  Neck ROM: full       Cardiovascular - normal exam  Rhythm: regular  Rate: normal  (-) murmur     Dental - normal exam           Pulmonary - normal exam  Breath sounds clear to auscultation     Abdominal    Neurological - normal exam                   Anesthesia Plan    ASA 2       Plan - general       Airway plan will be LMA          Induction: intravenous    Postoperative Plan: Postoperative administration of opioids is intended.    Pertinent diagnostic labs and testing reviewed    Informed Consent:    Anesthetic plan and risks discussed with patient.    Use of blood products discussed with: patient whom consented to blood products.

## 2024-04-21 DIAGNOSIS — I10 PRIMARY HYPERTENSION: ICD-10-CM

## 2024-04-22 NOTE — TELEPHONE ENCOUNTER
Received request via: Pharmacy    Was the patient seen in the last year in this department? Yes    Does the patient have an active prescription (recently filled or refills available) for medication(s) requested? No    Pharm[facility] Name[facility]    Does the patient have detention Plus and need 100 day supply (blood pressure, diabetes and cholesterol meds only)? Yes, quantity updated to 100 days

## 2024-04-23 RX ORDER — AMLODIPINE BESYLATE 10 MG/1
10 TABLET ORAL DAILY
Qty: 100 TABLET | Refills: 0 | Status: SHIPPED | OUTPATIENT
Start: 2024-04-23

## 2024-04-30 ENCOUNTER — OFFICE VISIT (OUTPATIENT)
Dept: SURGERY | Facility: MEDICAL CENTER | Age: 79
End: 2024-04-30
Payer: MEDICARE

## 2024-04-30 VITALS
DIASTOLIC BLOOD PRESSURE: 66 MMHG | HEART RATE: 78 BPM | BODY MASS INDEX: 26.49 KG/M2 | SYSTOLIC BLOOD PRESSURE: 126 MMHG | WEIGHT: 159 LBS | OXYGEN SATURATION: 94 % | TEMPERATURE: 97 F | HEIGHT: 65 IN

## 2024-04-30 DIAGNOSIS — C50.812 MALIGNANT NEOPLASM OF OVERLAPPING SITES OF LEFT BREAST IN FEMALE, ESTROGEN RECEPTOR POSITIVE (HCC): ICD-10-CM

## 2024-04-30 DIAGNOSIS — Z17.0 MALIGNANT NEOPLASM OF OVERLAPPING SITES OF LEFT BREAST IN FEMALE, ESTROGEN RECEPTOR POSITIVE (HCC): ICD-10-CM

## 2024-04-30 ASSESSMENT — FIBROSIS 4 INDEX: FIB4 SCORE: 1.3

## 2024-04-30 NOTE — PROGRESS NOTES
"Subjective    Delightful 78F s/p left partial mastectomy for IDC 2024, here for postop check and review of pathology.  Since surgery she reports no acute issues.    Pathology:  IDC spanning 1.2cm, margins negative.  The patient was given a copy of her pathology report.    Objective    /66 (BP Location: Right arm, Patient Position: Sitting, BP Cuff Size: Large adult)   Pulse 78   Temp 36.1 °C (97 °F) (Temporal)   Ht 1.651 m (5' 5\")   Wt 72.1 kg (159 lb)   SpO2 94%   BMI 26.46 kg/m²   Gen: Alert, oriented, pleasant, no acute distress  Chest: Respirations unlabored on room air with symmetric expansion  Abd: Soft, nondistended  Ext: Warm, well-perfused  Breasts: Left breast UOQ radial incision clean, dry, intact.  No erythema.  No exudate.  No palpable masses.  No palpable fluctuance.  No ecchymosis.  Expected contour.    Assessment & Plan    Delightful 78F s/p left PM (SLNBx omitted due to age) for IDC, overall doing very well postoperatively.    - Referrals: Medical oncology, radiation oncology   - Return to clinic: 10/2024   - Next clinical breast exam: 10/2024, 2025   - Next breast imagin2025 bilateral diagnostic mammogram    Problem   Malignant Neoplasm of Overlapping Sites of Left Breast in Female, Estrogen Receptor Positive (Hcc)    Left breast 03:00 9cmFN IDC, G2, ER+TN+GBX6fjdc, Ki67 <10%, [id number] (justa/prog IA)  - US-guided CNBx 2024  - Left partial mastectomy, omission of axillary sampling due to age/favorable tumor 2024 (Brooke)  - Pending referrals to medical oncology/radiation oncology  - Potential PAT trial candidate       Cancer Staging   Malignant neoplasm of overlapping sites of left breast in female, estrogen receptor positive (HCC)  Staging form: Breast, AJCC 8th Edition  - Clinical stage from 3/21/2024: cT1b, cN0, cM0, G2, ER+, TN+, HER2: Unknown - Signed by Meme Altamirano M.D. on 3/21/2024  - Pathologic stage from 2024: pT1c, cN0, cM0, G2, " ER+, AL+, HER2- - Signed by Meme Altamirano M.D. on 4/30/2024

## 2024-05-03 ENCOUNTER — PATIENT MESSAGE (OUTPATIENT)
Dept: SURGERY | Facility: MEDICAL CENTER | Age: 79
End: 2024-05-03
Payer: MEDICARE

## 2024-05-04 ASSESSMENT — LIFESTYLE VARIABLES
TOBACCO_USE: NO
SMOKING_STATUS: NO

## 2024-05-07 ENCOUNTER — HOSPITAL ENCOUNTER (OUTPATIENT)
Dept: RADIATION ONCOLOGY | Facility: MEDICAL CENTER | Age: 79
End: 2024-05-07
Attending: RADIOLOGY
Payer: MEDICARE

## 2024-05-07 VITALS
HEART RATE: 79 BPM | DIASTOLIC BLOOD PRESSURE: 53 MMHG | SYSTOLIC BLOOD PRESSURE: 104 MMHG | BODY MASS INDEX: 26.45 KG/M2 | OXYGEN SATURATION: 95 % | WEIGHT: 158.95 LBS

## 2024-05-07 DIAGNOSIS — Z17.0 MALIGNANT NEOPLASM OF OVERLAPPING SITES OF LEFT BREAST IN FEMALE, ESTROGEN RECEPTOR POSITIVE (HCC): ICD-10-CM

## 2024-05-07 DIAGNOSIS — C50.812 MALIGNANT NEOPLASM OF OVERLAPPING SITES OF LEFT BREAST IN FEMALE, ESTROGEN RECEPTOR POSITIVE (HCC): ICD-10-CM

## 2024-05-07 PROCEDURE — 99205 OFFICE O/P NEW HI 60 MIN: CPT | Performed by: RADIOLOGY

## 2024-05-07 ASSESSMENT — FIBROSIS 4 INDEX: FIB4 SCORE: 1.3

## 2024-05-07 ASSESSMENT — PAIN SCALES - GENERAL: PAINLEVEL: 4=SLIGHT-MODERATE PAIN

## 2024-05-07 NOTE — CONSULTS
RADIATION ONCOLOGY CONSULT    DATE OF SERVICE: 5/7/2024    IDENTIFICATION:  Stage IA ([id number]) G2 invasive ductal carcinoma of the left upper outer quadrant of breast ER/NH positive HER2/jay negative with a Ki-67 of less than 10% status post lumpectomy on 4/19/2024.      HISTORY OF PRESENT ILLNESS: I had the pleasure of seeing Ms. Nazario today in consultation at the request of Dr. Brooke Peres for her breast cancer.  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  She retired due to a knee injury which made it more challenging to care for her patients.  She is continued with routine mammograms and this year had a 8 mm mass identified in the left upper outer quadrant of her breast.  Ultrasound confirmed a hypoechoic lesion at roughly the 3 o'clock position.  Biopsy of this area showed an invasive ductal carcinoma with grade 2 features.  Tumor was ER/NH positive HER2/jay negative with a Ki-67 of less than 10%.  She elected to proceed with a lumpectomy on 4/19/2024.  This confirmed a 1.2 cm invasive ductal carcinoma with grade 2 features.  All surgical margins were negative.  She presents to me today to further discuss the role of radiation as a part of her breast conservation strategy.  She states that she is open to a discussion on endocrine therapy.    PAST MEDICAL HISTORY:   Past Medical History:   Diagnosis Date    Arthritis     Basal cell carcinoma of skin     Pxovxjwrs-Xxvsbwq-Rvnfjl disease 06/08/2016    Abgjghfgi-Asvjttn-Uqafsl disease 06/08/2016    Buttock pain 05/24/2019    Cataract 10/2014    Left  and Right Eye IOL    Constipation 11/06/2017    Dyslipidemia 04/12/2016    Essential hypertension 04/12/2016    Hypertension     Hypothyroidism     IBD (inflammatory bowel disease)     Impaired fasting blood sugar     Malignant neoplasm of overlapping sites of left breast in female, estrogen receptor positive (HCC)     Osteopenia     Trigger finger,  acquired 12/26/2012     ICD-10 transition    Type 2 diabetes mellitus with hyperglycemia, without long-term current use of insulin (HCC) 04/03/2023    pt reports that dr put her on this profolactiley       PAST SURGICAL HISTORY:  Past Surgical History:   Procedure Laterality Date    PB MASTECTOMY, PARTIAL Left 04/19/2024    Procedure: LEFT PARTIAL MASTECTOMY;  Surgeon: Meme Altamirano M.D.;  Location: SURGERY SAME DAY [facility];  Service: General    PB MANIPULATN KNEE JT+ANESTHESIA Left 06/06/2022    Procedure: LEFT KNEE MANIPULATION;  Surgeon: Bean Escobar M.D.;  Location: [facility];  Service: Orthopedics    PB TOTAL KNEE ARTHROPLASTY Left 04/04/2022    Procedure: LEFT TOTAL KNEE ARTHROPLASTY;  Surgeon: Bean Escobar M.D.;  Location: [facility];  Service: Orthopedics    PB INCISE FINGER TENDON SHEATH Right 10/26/2021    Procedure: RIGHT THUMB TRIGGER RELEASE;  Surgeon: Brett Maddox M.D.;  Location: [facility];  Service: Orthopedics    KNEE ARTHROSCOPY Right 06/08/2016    Procedure: KNEE ARTHROSCOPY, MEDIAL MENISECTOMY, DEBRIEDMENT OF LATERAL  FEMORAL CONDYLE;  Surgeon: Real Carias M.D.;  Location: [facility];  Service:     LATERAL RELEASE Right 06/08/2016    Procedure: LATERAL RELEASE;  Surgeon: Real Carias M.D.;  Location: [facility];  Service:     TRIGGER FINGER RELEASE Bilateral 04/19/2016    Procedure: TRIGGER FINGER RELEASE-RING;  Surgeon: Brett Maddox M.D.;  Location: [facility];  Service:     TRIGGER FINGER RELEASE  12/26/2012    Performed by Brett Maddox M.D. at [facility]    COLONOSCOPY  08/01/2011    CATARACT EXTRACTION WITH IOL Left 03/01/2009    left    TUBAL LIGATION  09/01/1980    APPENDECTOMY      BREAST BIOPSY      multiple breast biopsies    KNEE REPLACEMENT, TOTAL Left     LUMPECTOMY      x 2 or 3 in the 1980's    TONSILLECTOMY  as child        GYNECOLOGICAL STATUS:  : 2, Para: 2, and Age at first birth: 21    HORMONE USE:  Post menopause hormone use < 6 months     CURRENT MEDICATIONS:  Current Outpatient Medications   Medication Sig Dispense Refill    amLODIPine (NORVASC) 10 MG Tab TAKE 1 TABLET BY MOUTH EVERY  Tablet 0    celecoxib (CELEBREX) 200 MG Cap Take 1 Capsule by mouth 1 time a day as needed for Moderate Pain. 30 Capsule 3    Calcium Carb-Cholecalciferol (CALCIUM 500/D) 500-10 MG-MCG Chew Tab Chew every evening.      levothyroxine (SYNTHROID) 100 MCG Tab Take 1 Tablet by mouth every morning on an empty stomach. 90 Tablet 3    atorvastatin (LIPITOR) 20 MG Tab Take 1 Tablet by mouth every evening. 100 Tablet 3    furosemide (LASIX) 20 MG Tab Take 1 Tablet by mouth every day. 90 Tablet 3    lidocaine (XYLOCAINE) 2 % Solution Take 10 mL by mouth as needed for Throat/Mouth Pain. 100 mL 6    timolol (TIMOPTIC) 0.5 % Solution Administer 1 Drop into both eyes 2 times a day.      latanoprost (XALATAN) 0.005 % Solution Administer 1 Drop into both eyes every evening.      linaCLOtide (LINZESS) 145 MCG Cap TAKE 1 CAPSULE BY MOUTH ONCE DAILY AS NEEDED 90 Capsule 1    MAGNESIUM PO Take  by mouth.      metFORMIN ER (GLUCOPHAGE XR) 500 MG TABLET SR 24 HR Take 2 Tablets by mouth 2 times a day. (Patient taking differently: Take 500 mg by mouth 2 times a day. 1 tablet in the morning and 1 tab at night) 400 Tablet 3    Omega-3 Fatty Acids (FISH OIL) 1200 MG Cap Take  by mouth.       No current facility-administered medications for this encounter.       ALLERGIES:    Sulfa drugs    FAMILY HISTORY:    Family History   Problem Relation Age of Onset    Other Mother         MULTI SYSTEM FAILURE    Stroke Father     Hyperlipidemia Brother     Other Brother         aortic aneurysm repair    Cancer Maternal Aunt         Kidney ca    Kidney cancer Maternal Aunt 70 - 79       SOCIAL HISTORY:    Social History     Tobacco Use    Smoking status: Never     "Smokeless tobacco: Never   Vaping Use    Vaping Use: Never used   Substance Use Topics    Alcohol use: No     Alcohol/week: 0.0 oz    Drug use: No     Patient is retired from Psych Nurse.   Lives with: Spouse     REVIEW OF SYSTEMS:  A complete review of systems was completed in patient's chart on 2024.  All are negative with relationship to this diagnosis with the exception of:  Patient is healing well from surgery, does not have any suspicious lesions in the breast or axilla, denies any acute areas of bony tenderness or deformity, denies any new headaches or neurologic symptoms     PHYSICAL EXAM:    Vitals:    24 1310   BP: 104/53   BP Location: Right arm   Patient Position: Sitting   BP Cuff Size: Adult long   Pulse: 79   SpO2: 95%   Weight: 72.1 kg (158 lb 15.2 oz)   Pain Score: 4=Slight-Moderate Pain        ECO= Restricted in physically strenuous activity, but ambulatory and able to carry out work of a light sedentary nature, e.g., light housework, office work.    PAIN:  4  What makes the pain better: Patient states \"nothing works,\" not requiring pain medication.  What makes the pain worse: S/P left partial mastectomy touch and sleep.   Pain controlled with current regimen: yes  Pain related to condition being seen here for: yes    GENERAL: No apparent distress.  HEENT:  Pupils are equal, round, and reactive to light.  Extraocular muscles   are intact. Sclerae nonicteric.  Conjunctivae pink.  Oral cavity, tongue   protrudes midline.   NECK:  Supple without evidence of thyromegaly.  NODES:  No peripheral adenopathy of the neck, supraclavicular fossa or axillae   bilaterally.  LUNGS:  Clear to ascultation bilaterally   HEART:  Regular rate and rhythm.  No murmur appreciated  BREAST: No suspicious lesions found in either breast or axilla.  ABDOMEN:  Soft. No evidence of hepatosplenomegaly.  Positive bowel sounds.  EXTREMITIES:  Without Edema.  NEUROLOGIC:  Cranial nerves II through XII were intact. " Normal stance and gait motor and sensory grossly within normal limits       IMPRESSION:    Stage IA ([id number]) G2 invasive ductal carcinoma of the left upper outer quadrant of breast ER/IL positive HER2/jay negative with a Ki-67 of less than 10% status post lumpectomy on 4/19/2024.      RECOMMENDATIONS:    I discussed the diagnosis, prognosis, and treatment options over a 1 hr 5 min time period, 95% of that time dedicated to ongoing treatment management.  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  We discussed maximal benefit would come from endocrine therapy.  Therefore she would likely not require an Oncotype score.  She was scheduled to see Dr. Mayorga on May 20.  She has requested a referral to medical oncology at [facility].  She states her daughter works here in geriatrics as a physician at [facility] and would prefer to keep all of her care here at [facility].  Next we discussed the data surrounding mastectomy versus lumpectomy and radiation.  We discussed lumpectomy with and without radiation.  More specifically we discussed omission of radiation in favorable subgroup population.  She would be an optimal candidate for consideration of omission of radiation.  If she were to pursue radiation she would be either a candidate for whole breast radiotherapy without a boost or accelerated partial breast irradiation.  She came into the visit thinking she may opt for omission of radiation but after hearing her choices feels very comfortable with partial breast radiation.  Although she is chronologically 78 she has been very active working up until this year and does not have any major medical conditions.  She was given a simulation for May 22 at 1 PM.    We discussed the risks, benefits and side effects of treatment and the patient is amenable to treatment.  If patient has any questions or concerns, she should feel free to contact  me.    Thank you for the opportunity to participate in her care.  If any questions or comments, please do not hesitate in calling.

## 2024-05-07 NOTE — CT SIMULATION
PATIENT NAME Kaylee Nazario   PRIMARY PHYSICIAN Phani Gutierrez [id number]   REFERRING PHYSICIAN Meme Altamirano* 1945     Malignant neoplasm of overlapping sites of left breast in female, estrogen receptor positive (HCC)  Staging form: Breast, AJCC 8th Edition  - Clinical stage from 3/21/2024: cT1b, cN0, cM0, G2, ER+, ND+, HER2: Unknown - Signed by Meme Altamirano M.D. on 3/21/2024  Stage prefix: Initial diagnosis  Method of lymph node assessment: Clinical  Histologic grading system: 3 grade system  - Pathologic stage from 4/30/2024: pT1c, cN0, cM0, G2, ER+, ND+, HER2- - Signed by Meme Altamirano M.D. on 4/30/2024  Stage prefix: Initial diagnosis  Method of lymph node assessment: Clinical  Histologic grading system: 3 grade system         Treatment Planning CT Simulation        Order Questions       Question Answer Comment    Is this for a new course of treatment? Yes     Is this an Addendum? No     Implanted Device/Pacemaker No     Simulation Status Initial     Treatment Site Breast     Laterality Left     Treatment Technique 3D CRT     Other Technique(s) 3D     Treatment Pattern/Frequency Daily     Concurrent Chemotherapy No     CT Technique 3D DIBH possible     DIBH     Slice Thickness 3mm     Scan Extent Chest     Bowel Preparation No     Treatment Device(s) Vac Marisel      Wing Board     Treatment Marker Scar     Patient Attire Gown     Patient Position Supine     Patient Orientation Head First     Arm Position Up     Treatment Machine No preference     Treatment Image Guidance CBCT     Frequency (CBCT) Daily     Other Orders Weekly Physics Check                   Comments       5 fx abpi

## 2024-05-07 NOTE — PROGRESS NOTES
"Patient was seen today in clinic with Dr. Husain for consultation.  Vitals signs and weight were obtained and pain assessment was completed.  Allergies and medications were reviewed with the patient.      Vitals/Pain:  Vitals:    05/07/24 1310   BP: 104/53   BP Location: Right arm   Patient Position: Sitting   BP Cuff Size: Adult long   Pulse: 79   SpO2: 95%   Weight: 72.1 kg (158 lb 15.2 oz)   Pain Score: 4=Slight-Moderate Pain  Pain Scale: 0-10  Pain Assessement: Initial  Pain Location, Orientation and Scale: Breast: Left : Acute : 4  What makes the pain better: Not requiring pain medication. Patient stating that \"nothing works.\"  What makes the pain worse: S/P partial mastectomy. Touch and sleep.       Allergies:   Sulfa drugs    Current Medications:  Current Outpatient Medications   Medication Sig Dispense Refill    amLODIPine (NORVASC) 10 MG Tab TAKE 1 TABLET BY MOUTH EVERY  Tablet 0    celecoxib (CELEBREX) 200 MG Cap Take 1 Capsule by mouth 1 time a day as needed for Moderate Pain. 30 Capsule 3    Calcium Carb-Cholecalciferol (CALCIUM 500/D) 500-10 MG-MCG Chew Tab Chew every evening.      levothyroxine (SYNTHROID) 100 MCG Tab Take 1 Tablet by mouth every morning on an empty stomach. 90 Tablet 3    atorvastatin (LIPITOR) 20 MG Tab Take 1 Tablet by mouth every evening. 100 Tablet 3    furosemide (LASIX) 20 MG Tab Take 1 Tablet by mouth every day. 90 Tablet 3    lidocaine (XYLOCAINE) 2 % Solution Take 10 mL by mouth as needed for Throat/Mouth Pain. 100 mL 6    timolol (TIMOPTIC) 0.5 % Solution Administer 1 Drop into both eyes 2 times a day.      latanoprost (XALATAN) 0.005 % Solution Administer 1 Drop into both eyes every evening.      linaCLOtide (LINZESS) 145 MCG Cap TAKE 1 CAPSULE BY MOUTH ONCE DAILY AS NEEDED 90 Capsule 1    MAGNESIUM PO Take  by mouth.      metFORMIN ER (GLUCOPHAGE XR) 500 MG TABLET SR 24 HR Take 2 Tablets by mouth 2 times a day. (Patient taking differently: Take 500 mg by mouth 2 " times a day. 1 tablet in the morning and 1 tab at night) 400 Tablet 3    Omega-3 Fatty Acids (FISH OIL) 1200 MG Cap Take  by mouth.       No current facility-administered medications for this encounter.         PCP:  Matt Li R.N.

## 2024-05-20 DIAGNOSIS — E03.9 HYPOTHYROIDISM, UNSPECIFIED TYPE: ICD-10-CM

## 2024-05-20 DIAGNOSIS — E78.5 DYSLIPIDEMIA: ICD-10-CM

## 2024-05-22 ENCOUNTER — HOSPITAL ENCOUNTER (OUTPATIENT)
Dept: RADIATION ONCOLOGY | Facility: MEDICAL CENTER | Age: 79
End: 2024-05-22

## 2024-05-22 PROCEDURE — 77334 RADIATION TREATMENT AID(S): CPT | Mod: 26 | Performed by: RADIOLOGY

## 2024-05-22 PROCEDURE — 77290 THER RAD SIMULAJ FIELD CPLX: CPT | Mod: 26 | Performed by: RADIOLOGY

## 2024-05-22 PROCEDURE — 77263 THER RADIOLOGY TX PLNG CPLX: CPT | Performed by: RADIOLOGY

## 2024-05-22 NOTE — RADIATION PLANNING NOTES
Clinical Treatment Planning Note    DATE OF SERVICE: 5/22/2024    DIAGNOSIS:  Malignant neoplasm of overlapping sites of left breast in female, estrogen receptor positive (HCC)  Staging form: Breast, AJCC 8th Edition  - Clinical stage from 3/21/2024: cT1b, cN0, cM0, G2, ER+, MI+, HER2: Unknown - Signed by Meme Altamirano M.D. on 3/21/2024  Stage prefix: Initial diagnosis  Method of lymph node assessment: Clinical  Histologic grading system: 3 grade system  - Pathologic stage from 4/30/2024: pT1c, cN0, cM0, G2, ER+, MI+, HER2- - Signed by Meme Altamirano M.D. on 4/30/2024  Stage prefix: Initial diagnosis  Method of lymph node assessment: Clinical  Histologic grading system: 3 grade system         IMAGING REVIEWED:  [x] CT     [] MRI     [] PET/CT     [] BONE SCAN     [x] MAMMO     [] OTHER      TREATMENT INTENT:   [x] CURATIVE     [] MAINTENANCE     []  PALLIATIVE      []  SUPPORTIVE     []  PROPHYLACTIC     [] BENIGN     []  CONSOLIDATIVE      [] DEFINITIVE   []  OLOGIMETASTATIC      LINE OF TREATMENT:  [x] ADJUVANT   [] DEFINITIVE   [] NEOADJUVANT   [] RE-TREATMENT      TECHNIQUE PLANNED:  [] IMRT   [x] 3D   [] SBRT   [] SRS/SRT   [] HDR   [] ELECTRON       IMRT JUSTIFICATION:  []   An immediately adjacent area has been previously irradiated and abutting portals must be established with high precision.    []  Dose escalation is planned to deliver radiation doses in excess of those commonly utilized for similar tumors with conventional treatment.    []  The target volume is concave or convex, and the critical normal tissues are within or around that convexity or concavity.    []  The target volume is in close proximity to critical structures that must be protected.    []  The volume of interest must be covered with narrow margins to adequately protect  immediately adjacent structures.      FIELDS & BLOCKING:  [x] COMPLEX BLOCKS     []  = 3 TX AREAS     []  ARCS     []  CUSTOM SHEILD        []   SIMPLE BLOCK      CHEMOTHERAPY:  []  CONCURRENT     []  INDUCTION     [] SEQUENTIAL     []  <30 DAYS FROM XRT      NOTES:    OAR CONSTRAINTS: (GUIDELINES ONLY NOT ABSOLUTE)    Target Prescribed Coverage   PTV 95% of PTV covered by 95% (cGy) of RX Dose       YANET Goal   Max point dose PTV Eval <=110%   Contralateral Breast V5% < 2Gy   Ipsilateral Lung V15% < 20Gy   Ipsilateral Lung V35% < 10Gy   Ipsilateral Lung V50% < 5Gy   Contralateral Lung V10% < 5Gy   Heart (L Sided) V5% < 20Gy   *RTOG 1005, START-A, START-B

## 2024-05-22 NOTE — RADIATION PLANNING NOTES
DATE OF SERVICE: 5/22/2024    DIAGNOSIS:  Malignant neoplasm of overlapping sites of left breast in female, estrogen receptor positive (HCC)  Staging form: Breast, AJCC 8th Edition  - Clinical stage from 3/21/2024: cT1b, cN0, cM0, G2, ER+, CO+, HER2: Unknown - Signed by Meme Altamirano M.D. on 3/21/2024  Stage prefix: Initial diagnosis  Method of lymph node assessment: Clinical  Histologic grading system: 3 grade system  - Pathologic stage from 4/30/2024: pT1c, cN0, cM0, G2, ER+, CO+, HER2- - Signed by Meme Altamirano M.D. on 4/30/2024  Stage prefix: Initial diagnosis  Method of lymph node assessment: Clinical  Histologic grading system: 3 grade system       DATE OF SERVICE: 5/22/2024    TYPE OF SIMULATION: Breast       [] Right    [x] Left      GOAL OF TREATMENT:   [x] Curative  [] Palliative  [] Oligometastatic    COMPLEX:  [x] Complex Blocking   []Arcs  [] Custom Blocks  [] >3 Sites    PROCEDURE: Patient placed in supine position on wing board with VAC SCOTTIE bag with appropriate slant to compensate for slope of chest wall.  Breast/chest wall borders marked CT scan obtained to contain entire volume of interest.      I have personally reviewed the relevant data, performed the target localization, and determined all relevant factors for this patient’s simulation.

## 2024-05-23 ENCOUNTER — HOSPITAL ENCOUNTER (OUTPATIENT)
Dept: LAB | Facility: MEDICAL CENTER | Age: 79
End: 2024-05-23
Attending: FAMILY MEDICINE
Payer: MEDICARE

## 2024-05-23 DIAGNOSIS — E78.5 DYSLIPIDEMIA: ICD-10-CM

## 2024-05-23 DIAGNOSIS — E03.9 HYPOTHYROIDISM, UNSPECIFIED TYPE: ICD-10-CM

## 2024-05-23 LAB
CHOLEST SERPL-MCNC: 157 MG/DL (ref 100–199)
FASTING STATUS PATIENT QL REPORTED: NORMAL
HDLC SERPL-MCNC: 55 MG/DL
LDLC SERPL CALC-MCNC: 80 MG/DL
T4 FREE SERPL-MCNC: 1.45 NG/DL (ref 0.93–1.7)
TRIGL SERPL-MCNC: 110 MG/DL (ref 0–149)
TSH SERPL DL<=0.005 MIU/L-ACNC: 0.33 UIU/ML (ref 0.38–5.33)

## 2024-05-24 PROCEDURE — 77300 RADIATION THERAPY DOSE PLAN: CPT | Mod: 26 | Performed by: RADIOLOGY

## 2024-05-24 PROCEDURE — 77295 3-D RADIOTHERAPY PLAN: CPT | Mod: 26 | Performed by: RADIOLOGY

## 2024-05-24 PROCEDURE — 77334 RADIATION TREATMENT AID(S): CPT | Mod: 26 | Performed by: RADIOLOGY

## 2024-05-30 ENCOUNTER — OFFICE VISIT (OUTPATIENT)
Dept: MEDICAL GROUP | Facility: PHYSICIAN GROUP | Age: 79
End: 2024-05-30
Payer: MEDICARE

## 2024-05-30 VITALS
TEMPERATURE: 97.8 F | RESPIRATION RATE: 14 BRPM | DIASTOLIC BLOOD PRESSURE: 60 MMHG | HEART RATE: 70 BPM | WEIGHT: 155 LBS | HEIGHT: 65 IN | OXYGEN SATURATION: 95 % | SYSTOLIC BLOOD PRESSURE: 118 MMHG | BODY MASS INDEX: 25.83 KG/M2

## 2024-05-30 DIAGNOSIS — E11.9 TYPE 2 DIABETES MELLITUS WITHOUT COMPLICATION, WITHOUT LONG-TERM CURRENT USE OF INSULIN (HCC): ICD-10-CM

## 2024-05-30 DIAGNOSIS — I10 PRIMARY HYPERTENSION: ICD-10-CM

## 2024-05-30 DIAGNOSIS — H93.13 TINNITUS OF BOTH EARS: ICD-10-CM

## 2024-05-30 DIAGNOSIS — E03.9 HYPOTHYROIDISM, UNSPECIFIED TYPE: ICD-10-CM

## 2024-05-30 RX ORDER — METFORMIN HYDROCHLORIDE 500 MG/1
1000 TABLET, EXTENDED RELEASE ORAL 2 TIMES DAILY
Qty: 400 TABLET | Refills: 3 | Status: SHIPPED | OUTPATIENT
Start: 2024-05-30

## 2024-05-30 RX ORDER — FUROSEMIDE 20 MG/1
20 TABLET ORAL DAILY
Qty: 90 TABLET | Refills: 3 | Status: SHIPPED | OUTPATIENT
Start: 2024-05-30

## 2024-05-30 RX ORDER — AMLODIPINE BESYLATE 10 MG/1
10 TABLET ORAL DAILY
Qty: 100 TABLET | Refills: 3 | Status: SHIPPED | OUTPATIENT
Start: 2024-05-30

## 2024-05-30 ASSESSMENT — FIBROSIS 4 INDEX: FIB4 SCORE: 1.3

## 2024-05-30 ASSESSMENT — PATIENT HEALTH QUESTIONNAIRE - PHQ9: CLINICAL INTERPRETATION OF PHQ2 SCORE: 0

## 2024-05-30 NOTE — PROGRESS NOTES
"CHIEF COMPLAINT / REASON FOR VISIT  Kaylee Nazario is a 78 y.o. female that presents today for   Chief Complaint   Patient presents with    Diabetes Follow-up     HISTORY OF PRESENT ILLNESS  Chronic tinnitus, worsening over time.  Both ears.  Nonpulsatile    OBJECTIVE   /60 (BP Location: Right arm, Patient Position: Sitting, BP Cuff Size: Adult)   Pulse 70   Temp 36.6 °C (97.8 °F) (Temporal)   Resp 14   Ht 1.651 m (5' 5\")   Wt 70.3 kg (155 lb)   SpO2 95%   BMI 25.79 kg/m²      PHYSICAL EXAM  Constitutional: Sitting comfortably, in no acute distress, responds to questions appropriately.  Heart: Regular S1 S2, no murmurs, rub, or gallops  Lungs: Clear to auscultation bilaterally, no wheezes, rales, or rhonchi  Skin: Warm and dry, no rashes or lesions on face or exposed upper extremities    ASSESSMENT & PLAN  1. Type 2 diabetes mellitus without complication, without long-term current use of insulin (HCC)  Chronic, well-controlled with hemoglobin A1c 6.2.  Continue metformin 1000 mg twice daily.  - metFORMIN ER (GLUCOPHAGE XR) 500 MG TABLET SR 24 HR; Take 2 Tablets by mouth 2 times a day.  Dispense: 400 Tablet; Refill: 3    2. Hypothyroidism, unspecified type  Chronic, marginally controlled.  TSH very slightly suppressed at 0.326 with normal free T4.  Her TSH has been up-and-down for the past couple of years.  For now we will continue levothyroxine 100 mcg daily.  Recommend recheck TSH in 6 months.    3. Primary hypertension  Chronic, well-controlled, continue amlodipine 10 mg daily and furosemide 20 mg daily.  - amLODIPine (NORVASC) 10 MG Tab; Take 1 Tablet by mouth every day.  Dispense: 100 Tablet; Refill: 3  - furosemide (LASIX) 20 MG Tab; Take 1 Tablet by mouth every day.  Dispense: 90 Tablet; Refill: 3    4. Tinnitus of both ears  Chronic, gradually worsening.  Recommend white noise machine           "

## 2024-05-31 ENCOUNTER — PATIENT MESSAGE (OUTPATIENT)
Dept: ONCOLOGY | Facility: MEDICAL CENTER | Age: 79
End: 2024-05-31
Payer: MEDICARE

## 2024-05-31 ENCOUNTER — PATIENT OUTREACH (OUTPATIENT)
Dept: ONCOLOGY | Facility: MEDICAL CENTER | Age: 79
End: 2024-05-31
Payer: MEDICARE

## 2024-06-03 ENCOUNTER — HOSPITAL ENCOUNTER (OUTPATIENT)
Dept: RADIATION ONCOLOGY | Facility: MEDICAL CENTER | Age: 79
End: 2024-06-03
Attending: RADIOLOGY
Payer: MEDICARE
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
## 2024-06-05 ENCOUNTER — HOSPITAL ENCOUNTER (OUTPATIENT)
Dept: RADIATION ONCOLOGY | Facility: MEDICAL CENTER | Age: 79
End: 2024-06-05
Attending: RADIOLOGY
Payer: MEDICARE

## 2024-06-05 ENCOUNTER — HOSPITAL ENCOUNTER (OUTPATIENT)
Dept: RADIATION ONCOLOGY | Facility: MEDICAL CENTER | Age: 79
End: 2024-06-05

## 2024-06-05 VITALS
WEIGHT: 157.63 LBS | HEART RATE: 71 BPM | SYSTOLIC BLOOD PRESSURE: 120 MMHG | DIASTOLIC BLOOD PRESSURE: 85 MMHG | BODY MASS INDEX: 26.23 KG/M2 | OXYGEN SATURATION: 97 %

## 2024-06-05 LAB
CHEMOTHERAPY INFUSION START DATE: NORMAL
CHEMOTHERAPY RECORDS: 3000
CHEMOTHERAPY RECORDS: 6
CHEMOTHERAPY RECORDS: NORMAL
CHEMOTHERAPY RX CANCER: NORMAL
DATE 1ST CHEMO CANCER: NORMAL
RAD ONC ARIA COURSE LAST TREATMENT DATE: NORMAL
RAD ONC ARIA COURSE TREATMENT ELAPSED DAYS: NORMAL
RAD ONC ARIA REFERENCE POINT DOSAGE GIVEN TO DATE: 6
RAD ONC ARIA REFERENCE POINT ID: NORMAL
RAD ONC ARIA REFERENCE POINT SESSION DOSAGE GIVEN: 6

## 2024-06-05 PROCEDURE — 77280 THER RAD SIMULAJ FIELD SMPL: CPT | Mod: 26 | Performed by: RADIOLOGY

## 2024-06-05 PROCEDURE — 77280 THER RAD SIMULAJ FIELD SMPL: CPT | Performed by: RADIOLOGY

## 2024-06-05 PROCEDURE — 77412 RADIATION TX DELIVERY LVL 3: CPT | Performed by: RADIOLOGY

## 2024-06-05 ASSESSMENT — FIBROSIS 4 INDEX: FIB4 SCORE: 1.3

## 2024-06-05 ASSESSMENT — PAIN SCALES - GENERAL: PAINLEVEL: NO PAIN

## 2024-06-05 NOTE — ON TREATMENT VISIT
ON TREATMENT NOTE  RADIATION ONCOLOGY DEPARTMENT    Patient name:  Kaylee Nazario    Primary Physician:  Phani Gutierrez M.D. MRN: [id number]  [facility]: [id number]   Referring physician:  Meme Altamirano* : 1945, 78 y.o.     ENCOUNTER DATE:  24    DIAGNOSIS:    Malignant neoplasm of overlapping sites of left breast in female, estrogen receptor positive (HCC)  Staging form: Breast, AJCC 8th Edition  - Clinical stage from 3/21/2024: cT1b, cN0, cM0, G2, ER+, FL+, HER2: Unknown - Signed by Meme Altamirano M.D. on 3/21/2024  Stage prefix: Initial diagnosis  Method of lymph node assessment: Clinical  Histologic grading system: 3 grade system  - Pathologic stage from 2024: pT1c, cN0, cM0, G2, ER+, FL+, HER2- - Signed by Meme Altamirano M.D. on 2024  Stage prefix: Initial diagnosis  Method of lymph node assessment: Clinical  Histologic grading system: 3 grade system      TREATMENT SUMMARY:  [facility] Treatment Information          2024   [facility]ria Course Treatment Dates   Course First Treatment Date 2024    Course Last Treatment Date 2024    [facility]ria Treatment Summary   L Breast APBI  Plan from Course [email or web address]   Fraction 1 of 5   Elapsed Course Days 0 @ [id number]   Prescribed Fraction Dose 600 cGy   Prescribed Total Dose 3,000 cGy   PTV_APBI  Reference Point from Course [email or web address]   Elapsed Course Days 0 @    Session Dose 600 cGy   Total Dose 600 cGy      Radiation Treatments       Active   Plans   L Breast APBI   Most recent treatment: Dose planned: 600 cGy (fraction 1 of 5 on 2024)   Total: Dose planned: 3,000 cGy   Elapsed Days: 0 @            Historical   No historical radiation treatments to show.               SUBJECTIVE:   Patient is doing well.  She does not many questions regarding her radiation.    VITAL SIGNS:           2024     9:46 AM 2024     1:10 PM   Pain Assessment   Pain Score NO PAIN 4=SLIGHT ALTA    Pain Loc  BREAST          PHYSICAL EXAM:  No erythema    TOXICITY      6/5/2024     9:48 AM   Toxicity Assessment   Toxicity Assessment Breast   Fatigue (lethargy, malaise, asthenia) None   Fever (in the absence of neutropenia) None   Radiation Dermatitis None   Lymphatics Normal   RT - Pain due to RT None   Dyspnea Normal         IMPRESSION:  Cancer Staging   Malignant neoplasm of overlapping sites of left breast in female, estrogen receptor positive (HCC)  Staging form: Breast, AJCC 8th Edition  - Clinical stage from 3/21/2024: cT1b, cN0, cM0, G2, ER+, ME+, HER2: Unknown - Signed by Meme Altamirano M.D. on 3/21/2024  - Pathologic stage from 4/30/2024: pT1c, cN0, cM0, G2, ER+, ME+, HER2- - Signed by Meme Altamirano M.D. on 4/30/2024      PLAN:  No change in treatment plan    Disposition:  Treatment plan reviewed. Questions answered. Continue therapy outlined.     Phani Husain M.D.    No orders of the defined types were placed in this encounter.

## 2024-06-05 NOTE — CT SIMULATION
DATE OF SERVICE: 6/5/2024    Radiation Therapy Episodes       Active Episodes       Radiation Therapy: 3D CRT                   Radiation Treatments         Plan Last Treated On Elapsed Days Fractions Treated Prescribed Fraction Dose (cGy) Prescribed Total Dose (cGy)    L Breast APBI 6/5/2024 0 @ [id number] 1 of 5 600 3,000                  Reference Point Last Treated On Elapsed Days Most Recent Session Dose (cGy) Total Dose (cGy)    PTV_APBI 6/5/2024 0 @ [id number] 600 600                            First Visit Simple Simulation: Called by [employer] machine to verify treatment parameters including:  treatment site, treatment dose, and treatment setup prior to first treatment. Image derived shifts reviewed in all appropriate planes.  Shifts approved.  Patient treated.    I have personally reviewed the relevant data, performed the target localization, and determined all relevant factors for this patient’s simulation.

## 2024-06-06 ENCOUNTER — HOSPITAL ENCOUNTER (OUTPATIENT)
Dept: RADIATION ONCOLOGY | Facility: MEDICAL CENTER | Age: 79
End: 2024-06-06
Payer: MEDICARE

## 2024-06-06 LAB
CHEMOTHERAPY INFUSION START DATE: NORMAL
CHEMOTHERAPY RECORDS: 3000
CHEMOTHERAPY RECORDS: 6
CHEMOTHERAPY RECORDS: NORMAL
CHEMOTHERAPY RX CANCER: NORMAL
DATE 1ST CHEMO CANCER: NORMAL
RAD ONC ARIA COURSE LAST TREATMENT DATE: NORMAL
RAD ONC ARIA COURSE TREATMENT ELAPSED DAYS: NORMAL
RAD ONC ARIA REFERENCE POINT DOSAGE GIVEN TO DATE: 12
RAD ONC ARIA REFERENCE POINT ID: NORMAL
RAD ONC ARIA REFERENCE POINT SESSION DOSAGE GIVEN: 6

## 2024-06-06 PROCEDURE — 77387 GUIDANCE FOR RADJ TX DLVR: CPT | Performed by: RADIOLOGY

## 2024-06-06 PROCEDURE — 77412 RADIATION TX DELIVERY LVL 3: CPT | Performed by: RADIOLOGY

## 2024-06-06 PROCEDURE — 77014 PR CT GUIDANCE PLACEMENT RAD THERAPY FIELDS: CPT | Mod: 26 | Performed by: RADIOLOGY

## 2024-06-07 ENCOUNTER — HOSPITAL ENCOUNTER (OUTPATIENT)
Dept: RADIATION ONCOLOGY | Facility: MEDICAL CENTER | Age: 79
End: 2024-06-07
Payer: MEDICARE

## 2024-06-07 LAB
CHEMOTHERAPY INFUSION START DATE: NORMAL
CHEMOTHERAPY INFUSION START DATE: NORMAL
CHEMOTHERAPY RECORDS: 3000
CHEMOTHERAPY RECORDS: 3000
CHEMOTHERAPY RECORDS: 6
CHEMOTHERAPY RECORDS: 6
CHEMOTHERAPY RECORDS: NORMAL
CHEMOTHERAPY RX CANCER: NORMAL
CHEMOTHERAPY RX CANCER: NORMAL
DATE 1ST CHEMO CANCER: NORMAL
DATE 1ST CHEMO CANCER: NORMAL
RAD ONC ARIA COURSE LAST TREATMENT DATE: NORMAL
RAD ONC ARIA COURSE LAST TREATMENT DATE: NORMAL
RAD ONC ARIA COURSE TREATMENT ELAPSED DAYS: NORMAL
RAD ONC ARIA COURSE TREATMENT ELAPSED DAYS: NORMAL
RAD ONC ARIA REFERENCE POINT DOSAGE GIVEN TO DATE: 15
RAD ONC ARIA REFERENCE POINT DOSAGE GIVEN TO DATE: 18
RAD ONC ARIA REFERENCE POINT ID: NORMAL
RAD ONC ARIA REFERENCE POINT ID: NORMAL
RAD ONC ARIA REFERENCE POINT SESSION DOSAGE GIVEN: 3
RAD ONC ARIA REFERENCE POINT SESSION DOSAGE GIVEN: 3

## 2024-06-07 PROCEDURE — 77387 GUIDANCE FOR RADJ TX DLVR: CPT | Performed by: RADIOLOGY

## 2024-06-07 PROCEDURE — 77014 PR CT GUIDANCE PLACEMENT RAD THERAPY FIELDS: CPT | Mod: 26 | Performed by: RADIOLOGY

## 2024-06-07 PROCEDURE — 77412 RADIATION TX DELIVERY LVL 3: CPT | Performed by: RADIOLOGY

## 2024-06-07 PROCEDURE — 77336 RADIATION PHYSICS CONSULT: CPT | Performed by: RADIOLOGY

## 2024-06-07 PROCEDURE — 77417 THER RADIOLOGY PORT IMAGE(S): CPT | Performed by: RADIOLOGY

## 2024-06-10 ENCOUNTER — HOSPITAL ENCOUNTER (OUTPATIENT)
Dept: RADIATION ONCOLOGY | Facility: MEDICAL CENTER | Age: 79
End: 2024-06-10
Payer: MEDICARE

## 2024-06-10 LAB
CHEMOTHERAPY INFUSION START DATE: NORMAL
CHEMOTHERAPY RECORDS: 3000
CHEMOTHERAPY RECORDS: 6
CHEMOTHERAPY RECORDS: NORMAL
CHEMOTHERAPY RX CANCER: NORMAL
DATE 1ST CHEMO CANCER: NORMAL
RAD ONC ARIA COURSE LAST TREATMENT DATE: NORMAL
RAD ONC ARIA COURSE TREATMENT ELAPSED DAYS: NORMAL
RAD ONC ARIA REFERENCE POINT DOSAGE GIVEN TO DATE: 24
RAD ONC ARIA REFERENCE POINT ID: NORMAL
RAD ONC ARIA REFERENCE POINT SESSION DOSAGE GIVEN: 6

## 2024-06-10 PROCEDURE — 77387 GUIDANCE FOR RADJ TX DLVR: CPT | Performed by: RADIOLOGY

## 2024-06-10 PROCEDURE — 77412 RADIATION TX DELIVERY LVL 3: CPT | Performed by: RADIOLOGY

## 2024-06-10 PROCEDURE — 77014 PR CT GUIDANCE PLACEMENT RAD THERAPY FIELDS: CPT | Mod: 26 | Performed by: RADIOLOGY

## 2024-06-11 ENCOUNTER — HOSPITAL ENCOUNTER (OUTPATIENT)
Dept: RADIATION ONCOLOGY | Facility: MEDICAL CENTER | Age: 79
End: 2024-06-11

## 2024-06-11 ENCOUNTER — HOSPITAL ENCOUNTER (OUTPATIENT)
Dept: HEMATOLOGY ONCOLOGY | Facility: MEDICAL CENTER | Age: 79
End: 2024-06-11
Attending: INTERNAL MEDICINE
Payer: MEDICARE

## 2024-06-11 VITALS
DIASTOLIC BLOOD PRESSURE: 78 MMHG | TEMPERATURE: 97.4 F | HEIGHT: 65 IN | OXYGEN SATURATION: 97 % | SYSTOLIC BLOOD PRESSURE: 108 MMHG | WEIGHT: 155.98 LBS | BODY MASS INDEX: 25.99 KG/M2 | HEART RATE: 69 BPM

## 2024-06-11 DIAGNOSIS — Z79.811 AROMATASE INHIBITOR USE: ICD-10-CM

## 2024-06-11 DIAGNOSIS — C50.812 MALIGNANT NEOPLASM OF OVERLAPPING SITES OF LEFT BREAST IN FEMALE, ESTROGEN RECEPTOR POSITIVE (HCC): ICD-10-CM

## 2024-06-11 DIAGNOSIS — Z17.0 MALIGNANT NEOPLASM OF OVERLAPPING SITES OF LEFT BREAST IN FEMALE, ESTROGEN RECEPTOR POSITIVE (HCC): ICD-10-CM

## 2024-06-11 LAB
CHEMOTHERAPY INFUSION START DATE: NORMAL
CHEMOTHERAPY INFUSION START DATE: NORMAL
CHEMOTHERAPY INFUSION STOP DATE: NORMAL
CHEMOTHERAPY RECORDS: 3000
CHEMOTHERAPY RECORDS: 3000
CHEMOTHERAPY RECORDS: 6
CHEMOTHERAPY RECORDS: 6
CHEMOTHERAPY RECORDS: NORMAL
CHEMOTHERAPY RX CANCER: NORMAL
CHEMOTHERAPY RX CANCER: NORMAL
DATE 1ST CHEMO CANCER: NORMAL
DATE 1ST CHEMO CANCER: NORMAL
RAD ONC ARIA COURSE LAST TREATMENT DATE: NORMAL
RAD ONC ARIA COURSE LAST TREATMENT DATE: NORMAL
RAD ONC ARIA COURSE TREATMENT ELAPSED DAYS: NORMAL
RAD ONC ARIA COURSE TREATMENT ELAPSED DAYS: NORMAL
RAD ONC ARIA REFERENCE POINT DOSAGE GIVEN TO DATE: 30
RAD ONC ARIA REFERENCE POINT DOSAGE GIVEN TO DATE: 30
RAD ONC ARIA REFERENCE POINT ID: NORMAL
RAD ONC ARIA REFERENCE POINT ID: NORMAL
RAD ONC ARIA REFERENCE POINT SESSION DOSAGE GIVEN: 6

## 2024-06-11 PROCEDURE — 99212 OFFICE O/P EST SF 10 MIN: CPT | Performed by: INTERNAL MEDICINE

## 2024-06-11 PROCEDURE — 77387 GUIDANCE FOR RADJ TX DLVR: CPT | Performed by: RADIOLOGY

## 2024-06-11 PROCEDURE — 77412 RADIATION TX DELIVERY LVL 3: CPT | Performed by: RADIOLOGY

## 2024-06-11 PROCEDURE — 99204 OFFICE O/P NEW MOD 45 MIN: CPT | Performed by: INTERNAL MEDICINE

## 2024-06-11 PROCEDURE — 77014 PR CT GUIDANCE PLACEMENT RAD THERAPY FIELDS: CPT | Mod: 26 | Performed by: RADIOLOGY

## 2024-06-11 PROCEDURE — 77427 RADIATION TX MANAGEMENT X5: CPT | Performed by: RADIOLOGY

## 2024-06-11 RX ORDER — ANASTROZOLE 1 MG/1
1 TABLET ORAL DAILY
Qty: 90 TABLET | Refills: 1 | Status: SHIPPED | OUTPATIENT
Start: 2024-06-11

## 2024-06-11 ASSESSMENT — PAIN SCALES - GENERAL: PAINLEVEL: 9=SEVERE PAIN

## 2024-06-11 ASSESSMENT — FIBROSIS 4 INDEX: FIB4 SCORE: 1.3

## 2024-06-11 NOTE — PROGRESS NOTES
Consult:  Hematology/Oncology      Referring Physician: Meme Cox MD  Primary Care:  Phani Gutierrez M.D.    Diagnosis: Newly diagnosed HR positive HER2 negative left breast cancer    Chief Complaint: Newly diagnosed HR positive HER2 negative left breast cancer    History of Presenting Illness:  Kaylee Nazario is a 78 y.o. female who underwent routine mammography which showed an 8 mm mass in the upper outer quadrant of the left breast confirmed by ultrasound.  Ultrasound-guided biopsy on 3/19/2024 showed invasive ductal carcinoma, grade 2, tumor infiltrating lymphocytes 5%, no lymph vascular invasion, ER positive greater than 90%, [place] positive greater than 90%, Ki-67 less than 10%, HER2 IHC 1+.  She saw Dr. Pryor in consultation and underwent a left partial mastectomy without sentinel lymph node biopsy.  Pathology demonstrated invasive ductal carcinoma grade 2, 1.2 cm greatest dimension, perineural invasion was present.  Surgical margins were clear.  Postoperative course was unremarkable.  She underwent partial breast radiation 5 fractions and is completing it today.  She has not had any problems from this.  She is a retired psychiatric nurse and has been in good health for age with type 2 diabetes and hypertension under control.  She has had 1 knee replacement and has arthritis in the other knee.  No family history of breast cancer.      Past Medical History:   Diagnosis Date    Arthritis     Basal cell carcinoma of skin     Qbuyasuco-Ftkvjff-Fkmlks disease 06/08/2016    Kmjzjugpd-Haprmdn-Jfhmfg disease 06/08/2016    Buttock pain 05/24/2019    Cataract 10/2014    Left  and Right Eye IOL    Constipation 11/06/2017    Dyslipidemia 04/12/2016    Essential hypertension 04/12/2016    Hypertension     Hypothyroidism     IBD (inflammatory bowel disease)     Impaired fasting blood sugar     Malignant neoplasm of overlapping sites of left breast in female, estrogen receptor positive (HCC)     Osteopenia      Trigger finger, acquired 12/26/2012     ICD-10 transition    Type 2 diabetes mellitus with hyperglycemia, without long-term current use of insulin (HCC) 04/03/2023    pt reports that dr put her on this profolactiley       Past Surgical History:   Procedure Laterality Date    PB MASTECTOMY, PARTIAL Left 04/19/2024    Procedure: LEFT PARTIAL MASTECTOMY;  Surgeon: Meme Altamirano M.D.;  Location: SURGERY SAME DAY [facility] [place];  Service: General    PB MANIPULATN KNEE JT+ANESTHESIA Left 06/06/2022    Procedure: LEFT KNEE MANIPULATION;  Surgeon: Bean Escobar M.D.;  Location: [facility];  Service: Orthopedics    PB TOTAL KNEE ARTHROPLASTY Left 04/04/2022    Procedure: LEFT TOTAL KNEE ARTHROPLASTY;  Surgeon: Bean Escobar M.D.;  Location: [facility];  Service: Orthopedics    PB INCISE FINGER TENDON SHEATH Right 10/26/2021    Procedure: RIGHT THUMB TRIGGER RELEASE;  Surgeon: Brett Maddox M.D.;  Location: [facility];  Service: Orthopedics    KNEE ARTHROSCOPY Right 06/08/2016    Procedure: KNEE ARTHROSCOPY, MEDIAL MENISECTOMY, DEBRIEDMENT OF LATERAL  FEMORAL CONDYLE;  Surgeon: Real Carias M.D.;  Location: [facility];  Service:     LATERAL RELEASE Right 06/08/2016    Procedure: LATERAL RELEASE;  Surgeon: Real Carias M.D.;  Location: [facility];  Service:     TRIGGER FINGER RELEASE Bilateral 04/19/2016    Procedure: TRIGGER FINGER RELEASE-RING;  Surgeon: Brett Maddox M.D.;  Location: [facility];  Service:     TRIGGER FINGER RELEASE  12/26/2012    Performed by Brett Maddox M.D. at [facility]    COLONOSCOPY  08/01/2011    CATARACT EXTRACTION WITH IOL Left 03/01/2009    left    TUBAL LIGATION  09/01/1980    APPENDECTOMY      BREAST BIOPSY      multiple breast biopsies    KNEE REPLACEMENT, TOTAL Left     LUMPECTOMY      x 2 or 3 in the 1980's    TONSILLECTOMY  as child       Social  History     Tobacco Use    Smoking status: Never    Smokeless tobacco: Never   Vaping Use    Vaping status: Never Used   Substance Use Topics    Alcohol use: No     Alcohol/week: 0.0 oz    Drug use: No        Family History   Problem Relation Age of Onset    Other Mother         MULTI SYSTEM FAILURE    Stroke Father     Hyperlipidemia Brother     Other Brother         aortic aneurysm repair    Cancer Maternal Aunt         Kidney ca    Kidney cancer Maternal Aunt 70 - 79       Allergies as of 06/11/2024 - Reviewed 06/11/2024   Allergen Reaction Noted    Sulfa drugs Anaphylaxis and Shortness of Breath 07/27/2009         Current Outpatient Medications:     amLODIPine (NORVASC) 10 MG Tab, Take 1 Tablet by mouth every day., Disp: 100 Tablet, Rfl: 3    furosemide (LASIX) 20 MG Tab, Take 1 Tablet by mouth every day., Disp: 90 Tablet, Rfl: 3    metFORMIN ER (GLUCOPHAGE XR) 500 MG TABLET SR 24 HR, Take 2 Tablets by mouth 2 times a day., Disp: 400 Tablet, Rfl: 3    celecoxib (CELEBREX) 200 MG Cap, Take 1 Capsule by mouth 1 time a day as needed for Moderate Pain., Disp: 30 Capsule, Rfl: 3    Calcium Carb-Cholecalciferol (CALCIUM 500/D) 500-10 MG-MCG Chew Tab, Chew every evening., Disp: , Rfl:     levothyroxine (SYNTHROID) 100 MCG Tab, Take 1 Tablet by mouth every morning on an empty stomach., Disp: 90 Tablet, Rfl: 3    atorvastatin (LIPITOR) 20 MG Tab, Take 1 Tablet by mouth every evening., Disp: 100 Tablet, Rfl: 3    lidocaine (XYLOCAINE) 2 % Solution, Take 10 mL by mouth as needed for Throat/Mouth Pain., Disp: 100 mL, Rfl: 6    timolol (TIMOPTIC) 0.5 % Solution, Administer 1 Drop into both eyes 2 times a day., Disp: , Rfl:     latanoprost (XALATAN) 0.005 % Solution, Administer 1 Drop into both eyes every evening., Disp: , Rfl:     linaCLOtide (LINZESS) 145 MCG Cap, TAKE 1 CAPSULE BY MOUTH ONCE DAILY AS NEEDED, Disp: 90 Capsule, Rfl: 1    MAGNESIUM PO, Take  by mouth., Disp: , Rfl:     Review of Systems:  ROS       Physical  "Exam:  Vitals:    06/11/24 0905   BP: 108/78   BP Location: Left arm   Patient Position: Sitting   BP Cuff Size: Adult   Pulse: 69   Temp: 36.3 °C (97.4 °F)   TempSrc: Temporal   SpO2: 97%   Weight: 70.8 kg (155 lb 15.6 oz)   Height: 1.651 m (5' 5\")       DESC; KARNOFSKY SCALE WITH ECOG EQUIVALENT: 90, Able to carry on normal activity; minor signs or symptoms of disease (ECOG equivalent 0)    DISTRESS LEVEL: no acute distress    Physical Exam  Constitutional:       Appearance: Normal appearance.   HENT:      Head: Normocephalic.      Mouth/Throat:      Mouth: Mucous membranes are moist.      Pharynx: Oropharynx is clear.   Eyes:      Extraocular Movements: Extraocular movements intact.      Conjunctiva/sclera: Conjunctivae normal.      Pupils: Pupils are equal, round, and reactive to light.   Cardiovascular:      Rate and Rhythm: Normal rate and regular rhythm.      Pulses: Normal pulses.   Pulmonary:      Effort: Pulmonary effort is normal.      Breath sounds: Normal breath sounds.   Chest:      Comments: Good cosmetic result in the left breast after lumpectomy and partial breast radiation.  Both breasts are without masses nipple discharge or tenderness.  Both axilla without adenopathy.  Abdominal:      General: Abdomen is flat. Bowel sounds are normal.      Palpations: Abdomen is soft. There is no mass.   Musculoskeletal:         General: Normal range of motion.      Comments: Status post left knee replacement with arthritis in both legs.   Skin:     General: Skin is warm.   Neurological:      General: No focal deficit present.      Mental Status: She is alert and oriented to person, place, and time.   Psychiatric:         Mood and Affect: Mood normal.         Behavior: Behavior normal.            Labs:  Hospital Outpatient Visit on 06/10/2024   Component Date Value Ref Range Status    Course ID 06/10/2024 [email or web address]   Final    Course Start Date 06/10/2024 [id number]   Final    Course First Treatment Date " 06/10/2024 06/05/2024   Final    Course Last Treatment Date 06/10/2024 06/10/2024   Final    Course Elapsed Days 06/10/2024 5 @ [id number]   Final    Course Intent 06/10/2024 Curative   Final    RP ID 06/10/2024 [id number]   Final    RP Dosage Given to Date (Gy) 06/10/2024 24   Final    RP Session Dosage Given (Gy) 06/10/2024 6   Final    Plan ID 06/10/2024 L Breast APBI   Final    Plan Name 06/10/2024 L Breast APBI   Final    Plan Fractions Treated to Date 06/10/2024 4 of 5   Final    Plan Prescribed Dose Per Fraction * 06/10/2024 6   Final    Plan Total Prescribed Dose (cGy) 06/10/2024 3,000   Final   Hospital Outpatient Visit on 06/07/2024   Component Date Value Ref Range Status    Course ID 06/07/2024 [email or web address]   Final    Course Start Date 06/07/2024 [id number]   Final    Course First Treatment Date 06/07/2024 06/05/2024   Final    Course Last Treatment Date 06/07/2024 06/07/2024   Final    Course Elapsed Days 06/07/2024 2 @ [id number]   Final    Course Intent 06/07/2024 Curative   Final    RP ID 06/07/2024 [email or web address]   Final    RP Dosage Given to Date (Gy) 06/07/2024 15   Final    RP Session Dosage Given (Gy) 06/07/2024 3   Final    Plan ID 06/07/2024 L Breast APBI   Final    Plan Name 06/07/2024 L Breast APBI   Final    Plan Fractions Treated to Date 06/07/2024 3 of 5   Final    Plan Prescribed Dose Per Fraction * 06/07/2024 6   Final    Plan Total Prescribed Dose (cGy) 06/07/2024 3,000   Final    Course ID 06/07/2024 [email or web address]   Final    Course Start Date 06/07/2024 [id number]   Final    Course First Treatment Date 06/07/2024 06/05/2024   Final    Course Last Treatment Date 06/07/2024 06/07/2024   Final    Course Elapsed Days 06/07/2024 2 @ [id number]   Final    Course Intent 06/07/2024 Curative   Final    RP ID 06/07/2024 [email or web address]   Final    RP Dosage Given to Date (Gy) 06/07/2024 18   Final    RP Session Dosage Given (Gy) 06/07/2024 3   Final    Plan ID 06/07/2024 L Breast APBI   Final     Plan Name 06/07/2024 L Breast APBI   Final    Plan Fractions Treated to Date 06/07/2024 3 of 5   Final    Plan Prescribed Dose Per Fraction * 06/07/2024 6   Final    Plan Total Prescribed Dose (cGy) 06/07/2024 3,000   Final   Hospital Outpatient Visit on 06/06/2024   Component Date Value Ref Range Status    Course ID 06/06/2024 [email or web address]   Final    Course Start Date 06/06/2024 [id number]   Final    Course First Treatment Date 06/06/2024 06/05/2024   Final    Course Last Treatment Date 06/06/2024 06/06/2024   Final    Course Elapsed Days 06/06/2024 1 @ [id number]   Final    Course Intent 06/06/2024 Curative   Final    RP ID 06/06/2024 [email or web address]   Final    RP Dosage Given to Date (Gy) 06/06/2024 12   Final    RP Session Dosage Given (Gy) 06/06/2024 6   Final    Plan ID 06/06/2024 L Breast APBI   Final    Plan Name 06/06/2024 L Breast APBI   Final    Plan Fractions Treated to Date 06/06/2024 2 of 5   Final    Plan Prescribed Dose Per Fraction * 06/06/2024 6   Final    Plan Total Prescribed Dose (cGy) 06/06/2024 3,000   Final   Orders Only on 06/05/2024   Component Date Value Ref Range Status    Course ID 06/05/2024 [email or web address]   Final    Course Start Date 06/05/2024 [id number]   Final    Course First Treatment Date 06/05/2024 06/05/2024   Final    Course Last Treatment Date 06/05/2024 06/05/2024   Final    Course Elapsed Days 06/05/2024 0 @ [id number]   Final    Course Intent 06/05/2024 Curative   Final    RP ID 06/05/2024 [email or web address]   Final    RP Dosage Given to Date (Gy) 06/05/2024 6   Final    RP Session Dosage Given (Gy) 06/05/2024 6   Final    Plan ID 06/05/2024 L Breast APBI   Final    Plan Name 06/05/2024 L Breast APBI   Final    Plan Fractions Treated to Date 06/05/2024 1 of 5   Final    Plan Prescribed Dose Per Fraction * 06/05/2024 6   Final    Plan Total Prescribed Dose (cGy) 06/05/2024 3,000   Final   Hospital Outpatient Visit on 05/23/2024   Component Date Value Ref Range Status     Cholesterol,Tot 05/23/2024 157  100 - 199 mg/dL Final    Triglycerides 05/23/2024 110  0 - 149 mg/dL Final    HDL 05/23/2024 55  >=40 mg/dL Final    LDL 05/23/2024 80  <100 mg/dL Final    TSH 05/23/2024 0.326 (L)  0.380 - 5.330 uIU/mL Final    Comment: The 2011 American Thyroid Association (ZENIA) guidelines  recommended that the interpretation of thyroid function in  pregnancy be based on trimester specific reference ranges.    1st Trimester  0.100-2.500 mIU/L  2nd Trimester  0.200-3.000 mIU/L  3rd Trimester  0.300-3.500 mIU/L    These established reference ranges have not been validated  at [employer].      Fasting Status 05/23/2024 Fasting   Final    Free T-4 05/23/2024 1.45  0.93 - 1.70 ng/dL Final       Imaging:   All listed images below have been independently reviewed by me. I agree with the findings as summarized below:    No results found.     Pathology:      Assessment & Plan:  1.  Invasive ductal carcinoma of the left breast, grade 2, stage I (pT1c, PN X) ER positive greater than 90%, [place] positive greater than 90%, Ki-67 less than 10%, HER2 1+ IHC status post partial mastectomy without sentinel lymph node biopsy and status post APBI.  She is a good candidate for endocrine therapy with anastrozole.  Risks and benefits were discussed in full with the patient and she agrees.    Plan: Obtain bone density as she has not had one in 10 years.  We will see her back in 8 weeks to assess her initial tolerance with anastrozole.           Any questions and concerns raised by the patient were answered to the best of my ability. Thank you for allowing me to participate in the care for this patient. Please feel free to contact me for any questions or concerns.     Moiz Hall M.D.

## 2024-06-11 NOTE — ADDENDUM NOTE
Encounter addended by: Teresa Tan, Med Ass't on: 6/11/2024 11:26 AM   Actions taken: Charge Capture section accepted

## 2024-06-12 NOTE — RADIATION COMPLETION NOTES
END OF TREATMENT SUMMARY    Patient name:  Kaylee Nazario    Primary Physician:  Phani Gutierrez M.D. MRN: [id number]  CSN: [id number]   Referring physician:  Brooke Peres MD : 1945, 78 y.o.       TREATMENT SUMMARY:        Course First Treatment Date 2024    Course Last Treatment Date 2024   Course Elapsed Days 6 @ [id number]   Course Intent Curative     Radiation Therapy Episodes       Active Episodes       Radiation Therapy: 3D CRT                   Radiation Treatments         Plan Last Treated On Elapsed Days Fractions Treated Prescribed Fraction Dose (cGy) Prescribed Total Dose (cGy)    L Breast APBI 2024 6 @ [id number] 5 of 5 600 3,000                  Reference Point Last Treated On Elapsed Days Most Recent Session Dose (cGy) Total Dose (cGy)    PTV_APBI 2024 6 @ [id number] -- 3,000                                     STAGE:   Malignant neoplasm of overlapping sites of left breast in female, estrogen receptor positive (HCC)  Staging form: Breast, AJCC 8th Edition  - Clinical stage from 3/21/2024: cT1b, cN0, cM0, G2, ER+, NE+, HER2: Unknown - Signed by Meme Altamirano M.D. on 3/21/2024  Stage prefix: Initial diagnosis  Method of lymph node assessment: Clinical  Histologic grading system: 3 grade system  - Pathologic stage from 2024: pT1c, cN0, cM0, G2, ER+, NE+, HER2- - Signed by Meme Altamirano M.D. on 2024  Stage prefix: Initial diagnosis  Method of lymph node assessment: Clinical  Histologic grading system: 3 grade system       TREATMENT INDICATION:   Breast conservation surgery     CONCURRENT SYSTEMIC TREATMENT:   None     RT COURSE DISCONTINUED EARLY:   No     PATIENT EXPERIENCE:       2024     9:48 AM   Toxicity Assessment   Toxicity Assessment Breast   Fatigue (lethargy, malaise, asthenia) None   Fever (in the absence of neutropenia) None   Radiation Dermatitis None   Lymphatics Normal   RT - Pain due to RT None   Dyspnea  Normal        FOLLOW-UP PLAN:   6 Weeks     COMMENT:          ANATOMIC TARGET SUMMARY    ANATOMIC TARGET MODALITY TECHNIQUE   Left partial breast   External beam, photons 3D Conformal            COMMENT:         DIAGRAMS:      DOSE VOLUME HISTOGRAMS:

## 2024-06-18 ENCOUNTER — APPOINTMENT (OUTPATIENT)
Dept: RADIOLOGY | Facility: MEDICAL CENTER | Age: 79
End: 2024-06-18
Attending: STUDENT IN AN ORGANIZED HEALTH CARE EDUCATION/TRAINING PROGRAM
Payer: MEDICARE

## 2024-06-18 ENCOUNTER — OFFICE VISIT (OUTPATIENT)
Dept: URGENT CARE | Facility: PHYSICIAN GROUP | Age: 79
End: 2024-06-18
Payer: MEDICARE

## 2024-06-18 ENCOUNTER — HOSPITAL ENCOUNTER (EMERGENCY)
Facility: MEDICAL CENTER | Age: 79
End: 2024-06-18
Attending: STUDENT IN AN ORGANIZED HEALTH CARE EDUCATION/TRAINING PROGRAM
Payer: MEDICARE

## 2024-06-18 VITALS
OXYGEN SATURATION: 95 % | BODY MASS INDEX: 25.66 KG/M2 | HEART RATE: 95 BPM | TEMPERATURE: 98.1 F | SYSTOLIC BLOOD PRESSURE: 112 MMHG | HEIGHT: 65 IN | WEIGHT: 154 LBS | DIASTOLIC BLOOD PRESSURE: 54 MMHG | RESPIRATION RATE: 18 BRPM

## 2024-06-18 VITALS
HEIGHT: 65 IN | BODY MASS INDEX: 25.66 KG/M2 | SYSTOLIC BLOOD PRESSURE: 131 MMHG | OXYGEN SATURATION: 95 % | RESPIRATION RATE: 18 BRPM | HEART RATE: 73 BPM | TEMPERATURE: 98.7 F | WEIGHT: 154 LBS | DIASTOLIC BLOOD PRESSURE: 58 MMHG

## 2024-06-18 DIAGNOSIS — M54.50 LUMBAR BACK PAIN: ICD-10-CM

## 2024-06-18 DIAGNOSIS — C50.911 MALIGNANT NEOPLASM OF RIGHT FEMALE BREAST, UNSPECIFIED ESTROGEN RECEPTOR STATUS, UNSPECIFIED SITE OF BREAST (HCC): ICD-10-CM

## 2024-06-18 DIAGNOSIS — M53.3 SACROILIAC PAIN: ICD-10-CM

## 2024-06-18 LAB
ALBUMIN SERPL BCP-MCNC: 3.6 G/DL (ref 3.2–4.9)
ALBUMIN/GLOB SERPL: 1.2 G/DL
ALP SERPL-CCNC: 104 U/L (ref 30–99)
ALT SERPL-CCNC: 11 U/L (ref 2–50)
ANION GAP SERPL CALC-SCNC: 11 MMOL/L (ref 7–16)
AST SERPL-CCNC: 18 U/L (ref 12–45)
BASOPHILS # BLD AUTO: 0.2 % (ref 0–1.8)
BASOPHILS # BLD: 0.02 K/UL (ref 0–0.12)
BILIRUB SERPL-MCNC: 0.6 MG/DL (ref 0.1–1.5)
BUN SERPL-MCNC: 15 MG/DL (ref 8–22)
CALCIUM ALBUM COR SERPL-MCNC: 9.3 MG/DL (ref 8.5–10.5)
CALCIUM SERPL-MCNC: 9 MG/DL (ref 8.4–10.2)
CHLORIDE SERPL-SCNC: 99 MMOL/L (ref 96–112)
CO2 SERPL-SCNC: 23 MMOL/L (ref 20–33)
CREAT SERPL-MCNC: 0.73 MG/DL (ref 0.5–1.4)
EOSINOPHIL # BLD AUTO: 0 K/UL (ref 0–0.51)
EOSINOPHIL NFR BLD: 0 % (ref 0–6.9)
ERYTHROCYTE [DISTWIDTH] IN BLOOD BY AUTOMATED COUNT: 40.9 FL (ref 35.9–50)
GFR SERPLBLD CREATININE-BSD FMLA CKD-EPI: 83 ML/MIN/1.73 M 2
GLOBULIN SER CALC-MCNC: 3.1 G/DL (ref 1.9–3.5)
GLUCOSE SERPL-MCNC: 164 MG/DL (ref 65–99)
HCT VFR BLD AUTO: 36.5 % (ref 37–47)
HGB BLD-MCNC: 12.5 G/DL (ref 12–16)
IMM GRANULOCYTES # BLD AUTO: 0.04 K/UL (ref 0–0.11)
IMM GRANULOCYTES NFR BLD AUTO: 0.3 % (ref 0–0.9)
LYMPHOCYTES # BLD AUTO: 1.25 K/UL (ref 1–4.8)
LYMPHOCYTES NFR BLD: 10 % (ref 22–41)
MCH RBC QN AUTO: 31.1 PG (ref 27–33)
MCHC RBC AUTO-ENTMCNC: 34.2 G/DL (ref 32.2–35.5)
MCV RBC AUTO: 90.8 FL (ref 81.4–97.8)
MONOCYTES # BLD AUTO: 1.63 K/UL (ref 0–0.85)
MONOCYTES NFR BLD AUTO: 13 % (ref 0–13.4)
NEUTROPHILS # BLD AUTO: 9.56 K/UL (ref 1.82–7.42)
NEUTROPHILS NFR BLD: 76.5 % (ref 44–72)
NRBC # BLD AUTO: 0 K/UL
NRBC BLD-RTO: 0 /100 WBC (ref 0–0.2)
PLATELET # BLD AUTO: 222 K/UL (ref 164–446)
PMV BLD AUTO: 10.3 FL (ref 9–12.9)
POTASSIUM SERPL-SCNC: 3.9 MMOL/L (ref 3.6–5.5)
PROT SERPL-MCNC: 6.7 G/DL (ref 6–8.2)
RBC # BLD AUTO: 4.02 M/UL (ref 4.2–5.4)
SODIUM SERPL-SCNC: 133 MMOL/L (ref 135–145)
WBC # BLD AUTO: 12.5 K/UL (ref 4.8–10.8)

## 2024-06-18 PROCEDURE — 36415 COLL VENOUS BLD VENIPUNCTURE: CPT

## 2024-06-18 PROCEDURE — 1170F FXNL STATUS ASSESSED: CPT | Performed by: FAMILY MEDICINE

## 2024-06-18 PROCEDURE — 3074F SYST BP LT 130 MM HG: CPT | Performed by: FAMILY MEDICINE

## 2024-06-18 PROCEDURE — 72131 CT LUMBAR SPINE W/O DYE: CPT

## 2024-06-18 PROCEDURE — 99284 EMERGENCY DEPT VISIT MOD MDM: CPT | Mod: 25

## 2024-06-18 PROCEDURE — 80053 COMPREHEN METABOLIC PANEL: CPT

## 2024-06-18 PROCEDURE — 3078F DIAST BP <80 MM HG: CPT | Performed by: FAMILY MEDICINE

## 2024-06-18 PROCEDURE — 99214 OFFICE O/P EST MOD 30 MIN: CPT | Performed by: FAMILY MEDICINE

## 2024-06-18 PROCEDURE — 85025 COMPLETE CBC W/AUTO DIFF WBC: CPT

## 2024-06-18 RX ORDER — KETOROLAC TROMETHAMINE 30 MG/ML
30 INJECTION, SOLUTION INTRAMUSCULAR; INTRAVENOUS ONCE
Status: COMPLETED | OUTPATIENT
Start: 2024-06-18 | End: 2024-06-18

## 2024-06-18 RX ORDER — CYCLOBENZAPRINE HCL 10 MG
10 TABLET ORAL 3 TIMES DAILY PRN
Qty: 30 TABLET | Refills: 0 | Status: SHIPPED | OUTPATIENT
Start: 2024-06-18

## 2024-06-18 RX ORDER — LIDOCAINE 50 MG/G
1 PATCH TOPICAL EVERY 24 HOURS
Qty: 10 PATCH | Refills: 0 | Status: SHIPPED | OUTPATIENT
Start: 2024-06-18

## 2024-06-18 RX ADMIN — KETOROLAC TROMETHAMINE 30 MG: 30 INJECTION, SOLUTION INTRAMUSCULAR; INTRAVENOUS at 18:34

## 2024-06-18 ASSESSMENT — ENCOUNTER SYMPTOMS
CONSTITUTIONAL NEGATIVE: 1
CARDIOVASCULAR NEGATIVE: 1
GASTROINTESTINAL NEGATIVE: 1
RESPIRATORY NEGATIVE: 1
BACK PAIN: 1

## 2024-06-18 ASSESSMENT — PAIN DESCRIPTION - DESCRIPTORS: DESCRIPTORS: ACHING

## 2024-06-18 ASSESSMENT — FIBROSIS 4 INDEX
FIB4 SCORE: 1.3
FIB4 SCORE: 1.3

## 2024-06-19 NOTE — ED PROVIDER NOTES
ED Provider Note    CHIEF COMPLAINT  Chief Complaint   Patient presents with    Low Back Pain     Pt reports lower back pain was seen at UC was given IM toradol. Denies fall       EXTERNAL RECORDS REVIEWED  Outpatient Notes The patient was seen at the urgent care earlier today.  She was found to have left sacroiliac pain.  She was referred to the ER for imaging.  She was unable to stand without pain.  She has a history of breast cancer and is on chemotherapy.    HPI/ROS  LIMITATION TO HISTORY   None  OUTSIDE HISTORIAN(S):  Grandson at bedside    Kaylee Nazario is a 78 y.o. female who presents with acute onset lower back pain.  She says symptoms started last night.  She had pain all across her lower lumbar spine.  She says that the right side has improved but she still having ongoing left-sided pain.  She says it is worse with movement.  She denies any pain down her leg or radicular symptoms.  She denies any recent falls or injuries but does state that she fell about a month ago.  She  had toradol at urgent care which did not improve her symptoms. She has not been doing any heavy lifting or bending     She denies midline tenderness, fever, neurological changes, such as saddle anesthesia, numbness/tingling, change in strength, urinary retention, loss or bowel or bladder.  No history of DM and is not an immunocompromising medication.      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    SURGICAL HISTORY   has a past surgical history that includes cataract extraction with iol (Left, 03/01/2009); colonoscopy (08/01/2011); tonsillectomy (as child); tubal ligation (09/01/1980); trigger finger release (12/26/2012); breast biopsy; trigger finger release (Bilateral, 04/19/2016); knee arthroscopy (Right, 06/08/2016); lateral release (Right, 06/08/2016); pb incise finger tendon sheath (Right, 10/26/2021); total knee arthroplasty (Left, 04/04/2022); manipulatn knee jt+anesthesia (Left, 06/06/2022); appendectomy; knee replacement, total (Left); lumpectomy; and mastectomy, partial (Left, 04/19/2024).    FAMILY HISTORY  Family History   Problem Relation Age of Onset    Other Mother         MULTI SYSTEM FAILURE    Stroke Father     Hyperlipidemia Brother     Other Brother         aortic aneurysm repair    Cancer Maternal Aunt         Kidney ca    Kidney cancer Maternal Aunt 70 - 79       SOCIAL HISTORY  Social History     Tobacco Use    Smoking status: Never    Smokeless tobacco: Never   Vaping Use    Vaping status: Never Used   Substance and Sexual Activity    Alcohol use: No     Alcohol/week: 0.0 oz    Drug use: No    Sexual activity: Not Currently     Partners: Male     Comment:         CURRENT MEDICATIONS  Home Medications       Reviewed by Denise Wood R.N. (Registered Nurse) on 06/18/24 at 1913  Med List Status: Partial     Medication Last Dose Status   amLODIPine (NORVASC) 10 MG Tab  Active   anastrozole (ARIMIDEX) 1 MG Tab  Active   atorvastatin (LIPITOR) 20 MG Tab  Active   Calcium Carb-Cholecalciferol (CALCIUM 500/D) 500-10 MG-MCG Chew Tab  Active   celecoxib (CELEBREX) 200 MG Cap  Active   furosemide (LASIX) 20 MG Tab  Active   latanoprost (XALATAN) 0.005 % Solution  Active   levothyroxine (SYNTHROID) 100 MCG Tab  Active   lidocaine (XYLOCAINE) 2 % Solution  Active   linaCLOtide  "(LINZESS) 145 MCG Cap  Active   MAGNESIUM PO  Active   metFORMIN ER (GLUCOPHAGE XR) 500 MG TABLET SR 24 HR  Active   timolol (TIMOPTIC) 0.5 % Solution  Active                    ALLERGIES  Allergies   Allergen Reactions    Sulfa Drugs Anaphylaxis and Shortness of Breath       PHYSICAL EXAM  VITAL SIGNS: /58   Pulse 73   Temp 37.1 °C (98.7 °F) (Temporal)   Resp 18   Ht 1.651 m (5' 5\")   Wt 69.9 kg (154 lb)   SpO2 95%   BMI 25.63 kg/m²    Constitutional: Awake and alert. Nontoxic  HENT:  Grossly normal  Eyes: Grossly normal  Neck: Normal range of motion  Cardiovascular: Normal heart rate   Thorax & Lungs: No respiratory distress  Abdomen: Nontender  Back: No midline tenderness.  Tenderness in the left paraspinal region of lower lumbar spine. Full ROM.  Neuro: Sensation to gross touch intact including in saddle region. Strength 5/5 in all extremities.   Skin:  No pathologic rash.   Extremities: Well perfused  Psychiatric: Affect normal      EKG/LABS  Results for orders placed or performed during the hospital encounter of 06/18/24   CBC WITH DIFFERENTIAL   Result Value Ref Range    WBC 12.5 (H) 4.8 - 10.8 K/uL    RBC 4.02 (L) 4.20 - 5.40 M/uL    Hemoglobin 12.5 12.0 - 16.0 g/dL    Hematocrit 36.5 (L) 37.0 - 47.0 %    MCV 90.8 81.4 - 97.8 fL    MCH 31.1 27.0 - 33.0 pg    MCHC 34.2 32.2 - 35.5 g/dL    RDW 40.9 35.9 - 50.0 fL    Platelet Count 222 164 - 446 K/uL    MPV 10.3 9.0 - 12.9 fL    Neutrophils-Polys 76.50 (H) 44.00 - 72.00 %    Lymphocytes 10.00 (L) 22.00 - 41.00 %    Monocytes 13.00 0.00 - 13.40 %    Eosinophils 0.00 0.00 - 6.90 %    Basophils 0.20 0.00 - 1.80 %    Immature Granulocytes 0.30 0.00 - 0.90 %    Nucleated RBC 0.00 0.00 - 0.20 /100 WBC    Neutrophils (Absolute) 9.56 (H) 1.82 - 7.42 K/uL    Lymphs (Absolute) 1.25 1.00 - 4.80 K/uL    Monos (Absolute) 1.63 (H) 0.00 - 0.85 K/uL    Eos (Absolute) 0.00 0.00 - 0.51 K/uL    Baso (Absolute) 0.02 0.00 - 0.12 K/uL    Immature Granulocytes (abs) 0.04 " 0.00 - 0.11 K/uL    NRBC (Absolute) 0.00 K/uL   COMP METABOLIC PANEL   Result Value Ref Range    Sodium 133 (L) 135 - 145 mmol/L    Potassium 3.9 3.6 - 5.5 mmol/L    Chloride 99 96 - 112 mmol/L    Co2 23 20 - 33 mmol/L    Anion Gap 11.0 7.0 - 16.0    Glucose 164 (H) 65 - 99 mg/dL    Bun 15 8 - 22 mg/dL    Creatinine 0.73 0.50 - 1.40 mg/dL    Calcium 9.0 8.4 - 10.2 mg/dL    Correct Calcium 9.3 8.5 - 10.5 mg/dL    AST(SGOT) 18 12 - 45 U/L    ALT(SGPT) 11 2 - 50 U/L    Alkaline Phosphatase 104 (H) 30 - 99 U/L    Total Bilirubin 0.6 0.1 - 1.5 mg/dL    Albumin 3.6 3.2 - 4.9 g/dL    Total Protein 6.7 6.0 - 8.2 g/dL    Globulin 3.1 1.9 - 3.5 g/dL    A-G Ratio 1.2 g/dL   ESTIMATED GFR   Result Value Ref Range    GFR (CKD-EPI) 83 >60 mL/min/1.73 m 2       I have independently interpreted this EKG    RADIOLOGY/PROCEDURES   I have independently interpreted the diagnostic imaging associated with this visit and am waiting the final reading from the radiologist.   My preliminary interpretation is as follows: No obvious spinal fracture    Radiologist interpretation:  CT-LSPINE W/O PLUS RECONS   Final Result         1.  No acute traumatic bony injury of the lumbar spine.   2.  Diverticulosis   3.  Atherosclerosis          COURSE & MEDICAL DECISION MAKING    ASSESSMENT, COURSE AND PLAN  Care Narrative: This is a 78-year-old with breast cancer who presents with lower back pain.  She arrives with normal vital signs and is well-appearing.  Labs notable for leukocytosis, otherwise no significant derangements.  She has no signs or symptoms consistent with infection.  Fortunately she has no red flags such as saddle anesthesia, weakness, bladder or bowel dysfunction to suggest spinal cord compression or cauda equina syndrome.  I did obtain a CT which does not have any evidence of bony metastases or pathologic fracture.  I did consider epidural abscess however unlikely with no midline tenderness or fevers.  No trauma or anticoagulation and  doubt epidural hematoma.  Unclear exactly the etiology of her pain.  She declined pain medication here in the ER and is comfortable when she lays still.  I will give her prescription for Flexeril and lidocaine patches.  I advised close follow-up with her PCP and discussed the importance of strict ER return precautions and specially in the setting of diagnostic uncertainty.  She expresses understanding and was discharged with her grandson in good condition.           DISPOSITION AND DISCUSSIONS  I have discussed management of the patient with the following physicians and CLARENCE's:  None    Discussion of management with other QHP or appropriate source(s): None     Escalation of care considered, and ultimately not performed:None    Barriers to care at this time, including but not limited to:  None .     Decision tools and prescription drugs considered including, but not limited to: Pain Medications Flexeril and lidocaine patches .    FINAL DIAGNOSIS  1. Lumbar back pain Acute          Electronically signed by: Ladan Betancur M.D., 6/18/2024 7:20 PM

## 2024-06-19 NOTE — PROGRESS NOTES
"Subjective:   Kaylee Nazario is a 78 y.o. female who presents for Back Pain (X 1 day Lft side low back pain)      Patient with hx of breast cancer, had radiation tx, and is on chemo.  Awoke with severe left sacroiliac pain.  Unable to stand without coming to tears    Back Pain        Review of Systems   Constitutional: Negative.    HENT: Negative.     Respiratory: Negative.     Cardiovascular: Negative.    Gastrointestinal: Negative.    Genitourinary: Negative.    Musculoskeletal:  Positive for back pain.   Skin: Negative.        Medications, Allergies, and current problem list reviewed today in Epic.     Objective:     /54   Pulse 95   Temp 36.7 °C (98.1 °F) (Temporal)   Resp 18   Ht 1.651 m (5' 5\")   Wt 69.9 kg (154 lb)   SpO2 95%     Physical Exam  Vitals and nursing note reviewed.   Constitutional:       Appearance: Normal appearance.   HENT:      Head: Normocephalic and atraumatic.   Cardiovascular:      Rate and Rhythm: Normal rate and regular rhythm.      Pulses: Normal pulses.      Heart sounds: Normal heart sounds.   Pulmonary:      Effort: Pulmonary effort is normal.      Breath sounds: Normal breath sounds.   Abdominal:      General: Abdomen is flat. Bowel sounds are normal.      Palpations: Abdomen is soft.   Musculoskeletal:      Comments: Left sacroiliac pain, severe when trying to stand, or when shifting in wheel chair.     Neurological:      Mental Status: She is alert.         Assessment/Plan:     Diagnosis and associated orders:     1. Sacroiliac pain  ketorolac (Toradol) injection 30 mg      2. Malignant neoplasm of right female breast, unspecified estrogen receptor status, unspecified site of breast (HCC)           Comments/MDM:     Unable to ambulate  Transfer to ER for more dx studies         Differential diagnosis, natural history, supportive care, and indications for immediate follow-up discussed.    Advised the patient to follow-up with the primary care physician for " recheck, reevaluation, and consideration of further management.    Please note that this dictation was created using voice recognition software. I have made a reasonable attempt to correct obvious errors, but I expect that there are errors of grammar and possibly content that I did not discover before finalizing the note.    This note was electronically signed by Sean Hanna M.D.

## 2024-06-19 NOTE — DISCHARGE INSTRUCTIONS
There are many causes of low back pain. Most of the time, the pain is caused by conditions such as a muscle strain, inflammation or a bulging disc that cannot be identified on an X-ray or CT scan. Diagnostic imaging does not accurately identify the cause of most low back pain and therefore does not help guide therapy or improve the time to recovery.  Today, you are not exhibiting any worrisome symptoms or physical exam findings that suggest a need for an emergent MRI.   This does not mean that you may not require an MRI as an outpatient in the future if your pain persists or if you develop additional neurologic symptoms.  It is extremely important for you to follow up with your outpatient provider for further examination and discussion regarding treatment and imaging, if necessary.    For pain, take Ibuprofen (Motrin, Advil) 600 mg up to every six hours  mg up to every eight hours if your stomach can take it. Instead of Ibuprofen, you can choose Naproxen (Aleve) and take 250-500 mg twice daily. Take Ibuprofen or Naproxen with food to lessen stomach irritation. Also take Acetaminophen (Tylenol) up to 1gm every six hours in addition to Ibuprofen or Naproxen. (Maximum Tylenol 4 gm/day).  I suggest alternating between the ibuprofen and Tylenol every 4 hours for both optimal pain relief and adequate spacing of each medication. In addition, I am prescribing lidocaine patches, twice daily (put one on in the morning and replace with another at night).  Lastly, I am prescribing a muscle relaxer for breakthrough pain only. This means if you are still in lots of discomfort despite taking the above as scheduled, you can take a muscle relaxer. But DO NOT drink of drive or operate machinery when taking the muscle relaxer.      Please return to the Emergency Department if you have any worsening symptoms or any new concerning symptoms including but not limited to: urinary retention, loss of bowel or bladder control, decreased  or change in sensation in saddle/genital region or anywhere, change in strength, fever, chills or feelings of un-wellness. Please also return if you have significant fall or trauma  especially if you are age 65 or have osteoporosis.

## 2024-06-19 NOTE — ED TRIAGE NOTES
"Chief Complaint   Patient presents with    Low Back Pain     Pt reports lower back pain was seen at  was given IM toradol. Denies fall     /68   Pulse 85   Temp 37.2 °C (99 °F) (Temporal)   Resp 18   Ht 1.651 m (5' 5\")   Wt 69.9 kg (154 lb)   SpO2 95%   BMI 25.63 kg/m²     "

## 2024-06-27 ENCOUNTER — HOSPITAL ENCOUNTER (OUTPATIENT)
Dept: RADIOLOGY | Facility: MEDICAL CENTER | Age: 79
End: 2024-06-27
Attending: INTERNAL MEDICINE
Payer: MEDICARE

## 2024-06-27 DIAGNOSIS — Z79.811 AROMATASE INHIBITOR USE: ICD-10-CM

## 2024-06-27 PROCEDURE — 77080 DXA BONE DENSITY AXIAL: CPT

## 2024-07-05 ENCOUNTER — PATIENT OUTREACH (OUTPATIENT)
Dept: ONCOLOGY | Facility: MEDICAL CENTER | Age: 79
End: 2024-07-05
Payer: MEDICARE

## 2024-07-15 SDOH — HEALTH STABILITY: PHYSICAL HEALTH: ON AVERAGE, HOW MANY DAYS PER WEEK DO YOU ENGAGE IN MODERATE TO STRENUOUS EXERCISE (LIKE A BRISK WALK)?: 1 DAY

## 2024-07-15 SDOH — ECONOMIC STABILITY: FOOD INSECURITY: WITHIN THE PAST 12 MONTHS, THE FOOD YOU BOUGHT JUST DIDN'T LAST AND YOU DIDN'T HAVE MONEY TO GET MORE.: NEVER TRUE

## 2024-07-15 SDOH — ECONOMIC STABILITY: FOOD INSECURITY: WITHIN THE PAST 12 MONTHS, YOU WORRIED THAT YOUR FOOD WOULD RUN OUT BEFORE YOU GOT MONEY TO BUY MORE.: NEVER TRUE

## 2024-07-15 SDOH — ECONOMIC STABILITY: HOUSING INSECURITY: IN THE LAST 12 MONTHS, HOW MANY PLACES HAVE YOU LIVED?: 1

## 2024-07-15 SDOH — HEALTH STABILITY: PHYSICAL HEALTH: ON AVERAGE, HOW MANY MINUTES DO YOU ENGAGE IN EXERCISE AT THIS LEVEL?: 10 MIN

## 2024-07-15 SDOH — ECONOMIC STABILITY: INCOME INSECURITY: IN THE LAST 12 MONTHS, WAS THERE A TIME WHEN YOU WERE NOT ABLE TO PAY THE MORTGAGE OR RENT ON TIME?: NO

## 2024-07-15 ASSESSMENT — SOCIAL DETERMINANTS OF HEALTH (SDOH)
HOW OFTEN DO YOU ATTEND CHURCH OR RELIGIOUS SERVICES?: MORE THAN 4 TIMES PER YEAR
HOW OFTEN DO YOU GET TOGETHER WITH FRIENDS OR RELATIVES?: THREE TIMES A WEEK
HOW MANY DRINKS CONTAINING ALCOHOL DO YOU HAVE ON A TYPICAL DAY WHEN YOU ARE DRINKING: PATIENT DOES NOT DRINK
IN A TYPICAL WEEK, HOW MANY TIMES DO YOU TALK ON THE PHONE WITH FAMILY, FRIENDS, OR NEIGHBORS?: THREE TIMES A WEEK
HOW OFTEN DO YOU ATTENT MEETINGS OF THE CLUB OR ORGANIZATION YOU BELONG TO?: MORE THAN 4 TIMES PER YEAR
DO YOU BELONG TO ANY CLUBS OR ORGANIZATIONS SUCH AS CHURCH GROUPS UNIONS, FRATERNAL OR ATHLETIC GROUPS, OR SCHOOL GROUPS?: YES
HOW OFTEN DO YOU ATTEND CHURCH OR RELIGIOUS SERVICES?: MORE THAN 4 TIMES PER YEAR
HOW OFTEN DO YOU GET TOGETHER WITH FRIENDS OR RELATIVES?: THREE TIMES A WEEK
WITHIN THE PAST 12 MONTHS, YOU WORRIED THAT YOUR FOOD WOULD RUN OUT BEFORE YOU GOT THE MONEY TO BUY MORE: NEVER TRUE
HOW OFTEN DO YOU HAVE SIX OR MORE DRINKS ON ONE OCCASION: NEVER
DO YOU BELONG TO ANY CLUBS OR ORGANIZATIONS SUCH AS CHURCH GROUPS UNIONS, FRATERNAL OR ATHLETIC GROUPS, OR SCHOOL GROUPS?: YES
IN A TYPICAL WEEK, HOW MANY TIMES DO YOU TALK ON THE PHONE WITH FAMILY, FRIENDS, OR NEIGHBORS?: THREE TIMES A WEEK
HOW OFTEN DO YOU HAVE A DRINK CONTAINING ALCOHOL: NEVER
HOW OFTEN DO YOU ATTENT MEETINGS OF THE CLUB OR ORGANIZATION YOU BELONG TO?: MORE THAN 4 TIMES PER YEAR
IN THE PAST 12 MONTHS, HAS THE ELECTRIC, GAS, OIL, OR WATER COMPANY THREATENED TO SHUT OFF SERVICE IN YOUR HOME?: NO

## 2024-07-15 ASSESSMENT — LIFESTYLE VARIABLES
AUDIT-C TOTAL SCORE: 0
HOW OFTEN DO YOU HAVE SIX OR MORE DRINKS ON ONE OCCASION: NEVER
HOW OFTEN DO YOU HAVE A DRINK CONTAINING ALCOHOL: NEVER
HOW MANY STANDARD DRINKS CONTAINING ALCOHOL DO YOU HAVE ON A TYPICAL DAY: PATIENT DOES NOT DRINK
SKIP TO QUESTIONS 9-10: 1

## 2024-07-18 ENCOUNTER — PATIENT MESSAGE (OUTPATIENT)
Dept: HEALTH INFORMATION MANAGEMENT | Facility: OTHER | Age: 79
End: 2024-07-18

## 2024-07-18 ENCOUNTER — PATIENT OUTREACH (OUTPATIENT)
Dept: ONCOLOGY | Facility: MEDICAL CENTER | Age: 79
End: 2024-07-18

## 2024-07-18 ENCOUNTER — OFFICE VISIT (OUTPATIENT)
Dept: MEDICAL GROUP | Facility: MEDICAL CENTER | Age: 79
End: 2024-07-18
Payer: MEDICARE

## 2024-07-18 ENCOUNTER — TELEPHONE (OUTPATIENT)
Dept: HEMATOLOGY ONCOLOGY | Facility: MEDICAL CENTER | Age: 79
End: 2024-07-18

## 2024-07-18 ENCOUNTER — PATIENT OUTREACH (OUTPATIENT)
Dept: HEALTH INFORMATION MANAGEMENT | Facility: OTHER | Age: 79
End: 2024-07-18

## 2024-07-18 VITALS
OXYGEN SATURATION: 98 % | HEART RATE: 78 BPM | WEIGHT: 152 LBS | SYSTOLIC BLOOD PRESSURE: 114 MMHG | DIASTOLIC BLOOD PRESSURE: 70 MMHG | HEIGHT: 65 IN | BODY MASS INDEX: 25.33 KG/M2 | TEMPERATURE: 97 F

## 2024-07-18 DIAGNOSIS — K14.6 BURNING MOUTH SYNDROME: ICD-10-CM

## 2024-07-18 DIAGNOSIS — H93.13 TINNITUS OF BOTH EARS: ICD-10-CM

## 2024-07-18 DIAGNOSIS — E11.65 TYPE 2 DIABETES MELLITUS WITH HYPERGLYCEMIA, WITHOUT LONG-TERM CURRENT USE OF INSULIN (HCC): ICD-10-CM

## 2024-07-18 DIAGNOSIS — I10 PRIMARY HYPERTENSION: ICD-10-CM

## 2024-07-18 DIAGNOSIS — M25.561 CHRONIC PAIN OF RIGHT KNEE: ICD-10-CM

## 2024-07-18 DIAGNOSIS — K59.00 CONSTIPATION, UNSPECIFIED CONSTIPATION TYPE: ICD-10-CM

## 2024-07-18 DIAGNOSIS — G89.29 CHRONIC PAIN OF RIGHT KNEE: ICD-10-CM

## 2024-07-18 DIAGNOSIS — Z85.828 HX OF SKIN CANCER, BASAL CELL: ICD-10-CM

## 2024-07-18 DIAGNOSIS — E11.9 TYPE 2 DIABETES MELLITUS WITHOUT COMPLICATION, WITHOUT LONG-TERM CURRENT USE OF INSULIN (HCC): ICD-10-CM

## 2024-07-18 DIAGNOSIS — E03.9 HYPOTHYROIDISM, UNSPECIFIED TYPE: ICD-10-CM

## 2024-07-18 DIAGNOSIS — Z12.83 SCREENING EXAM FOR SKIN CANCER: ICD-10-CM

## 2024-07-18 PROBLEM — M65.311 TRIGGER THUMB OF RIGHT HAND: Status: RESOLVED | Noted: 2021-10-04 | Resolved: 2024-07-18

## 2024-07-18 PROBLEM — Z87.81 HISTORY OF FRACTURE OF LEFT ANKLE: Status: RESOLVED | Noted: 2022-08-10 | Resolved: 2024-07-18

## 2024-07-18 PROBLEM — H26.9 CATARACT: Status: RESOLVED | Noted: 2017-11-06 | Resolved: 2024-07-18

## 2024-07-18 PROBLEM — M79.643 HAND PAIN: Status: RESOLVED | Noted: 2021-10-26 | Resolved: 2024-07-18

## 2024-07-18 PROBLEM — Z96.652 HISTORY OF LEFT KNEE REPLACEMENT: Status: RESOLVED | Noted: 2022-04-04 | Resolved: 2024-07-18

## 2024-07-18 PROBLEM — H40.89 OTHER SPECIFIED GLAUCOMA: Status: ACTIVE | Noted: 2021-12-23

## 2024-07-18 PROBLEM — M25.562 CHRONIC PAIN OF LEFT KNEE: Status: RESOLVED | Noted: 2021-09-20 | Resolved: 2024-07-18

## 2024-07-18 PROBLEM — R41.3 POOR SHORT TERM MEMORY: Status: RESOLVED | Noted: 2022-08-10 | Resolved: 2024-07-18

## 2024-07-18 PROBLEM — M17.12 OSTEOARTHRITIS OF LEFT KNEE: Status: RESOLVED | Noted: 2022-02-02 | Resolved: 2024-07-18

## 2024-07-18 PROBLEM — C44.310 FACIAL BASAL CELL CANCER: Status: RESOLVED | Noted: 2019-05-24 | Resolved: 2024-07-18

## 2024-07-18 PROCEDURE — 3074F SYST BP LT 130 MM HG: CPT | Performed by: STUDENT IN AN ORGANIZED HEALTH CARE EDUCATION/TRAINING PROGRAM

## 2024-07-18 PROCEDURE — 1170F FXNL STATUS ASSESSED: CPT | Performed by: STUDENT IN AN ORGANIZED HEALTH CARE EDUCATION/TRAINING PROGRAM

## 2024-07-18 PROCEDURE — 99214 OFFICE O/P EST MOD 30 MIN: CPT | Performed by: STUDENT IN AN ORGANIZED HEALTH CARE EDUCATION/TRAINING PROGRAM

## 2024-07-18 PROCEDURE — 3078F DIAST BP <80 MM HG: CPT | Performed by: STUDENT IN AN ORGANIZED HEALTH CARE EDUCATION/TRAINING PROGRAM

## 2024-07-18 RX ORDER — METFORMIN HYDROCHLORIDE 500 MG/1
500 TABLET, EXTENDED RELEASE ORAL 2 TIMES DAILY
Qty: 200 TABLET | Refills: 3 | Status: SHIPPED | OUTPATIENT
Start: 2024-07-18

## 2024-07-18 RX ORDER — LINACLOTIDE 145 UG/1
CAPSULE, GELATIN COATED ORAL
Qty: 90 CAPSULE | Refills: 1 | Status: SHIPPED | OUTPATIENT
Start: 2024-07-18

## 2024-07-18 ASSESSMENT — ENCOUNTER SYMPTOMS
CHILLS: 0
DIZZINESS: 0
WHEEZING: 0
SHORTNESS OF BREATH: 0
PALPITATIONS: 0
VOMITING: 0
HEADACHES: 0
NAUSEA: 0
FEVER: 0
WEIGHT LOSS: 0
CONSTIPATION: 1

## 2024-07-18 ASSESSMENT — FIBROSIS 4 INDEX: FIB4 SCORE: 1.93

## 2024-07-18 NOTE — PROGRESS NOTES
"ONCOLOGY NURSE NAVIGATION (ONN) ASSESSMENT:  NN reached out to patient to follow up on voice message that was left after 6:00 p.m. last night.  No answer.  NN Pb left a voice message requesting a call back at her first convenience.        DX: Breast Cancer    POC: Left partial mastectomy with Dr. Johns.    FAMHX: Cancer-related family history includes Cancer in her maternal aunt.             BARRIERS ASSESSMENT:  No barriers or DST assessed, unable to speak with patient.       INTERVENTION:  Letter of introduction, business card and [facility] cancer care services calendar sent via [facility] after previous encounter..  "

## 2024-07-22 ENCOUNTER — TELEPHONE (OUTPATIENT)
Dept: HEMATOLOGY ONCOLOGY | Facility: MEDICAL CENTER | Age: 79
End: 2024-07-22
Payer: MEDICARE

## 2024-07-25 ENCOUNTER — HOSPITAL ENCOUNTER (OUTPATIENT)
Dept: RADIATION ONCOLOGY | Facility: MEDICAL CENTER | Age: 79
End: 2024-07-25
Attending: RADIOLOGY
Payer: MEDICARE

## 2024-07-30 ENCOUNTER — PATIENT OUTREACH (OUTPATIENT)
Dept: HEALTH INFORMATION MANAGEMENT | Facility: OTHER | Age: 79
End: 2024-07-30
Payer: MEDICARE

## 2024-07-30 ENCOUNTER — TELEPHONE (OUTPATIENT)
Dept: HEMATOLOGY ONCOLOGY | Facility: MEDICAL CENTER | Age: 79
End: 2024-07-30
Payer: MEDICARE

## 2024-07-30 DIAGNOSIS — I10 PRIMARY HYPERTENSION: ICD-10-CM

## 2024-07-30 DIAGNOSIS — C50.812 MALIGNANT NEOPLASM OF OVERLAPPING SITES OF LEFT BREAST IN FEMALE, ESTROGEN RECEPTOR POSITIVE (HCC): ICD-10-CM

## 2024-07-30 DIAGNOSIS — M25.561 CHRONIC PAIN OF RIGHT KNEE: ICD-10-CM

## 2024-07-30 DIAGNOSIS — G89.29 CHRONIC PAIN OF RIGHT KNEE: ICD-10-CM

## 2024-07-30 DIAGNOSIS — Z17.0 MALIGNANT NEOPLASM OF OVERLAPPING SITES OF LEFT BREAST IN FEMALE, ESTROGEN RECEPTOR POSITIVE (HCC): ICD-10-CM

## 2024-07-30 DIAGNOSIS — E78.5 DYSLIPIDEMIA: ICD-10-CM

## 2024-07-30 DIAGNOSIS — E11.9 TYPE 2 DIABETES MELLITUS WITHOUT COMPLICATION, WITHOUT LONG-TERM CURRENT USE OF INSULIN (HCC): ICD-10-CM

## 2024-07-30 SDOH — ECONOMIC STABILITY: FOOD INSECURITY: WITHIN THE PAST 12 MONTHS, THE FOOD YOU BOUGHT JUST DIDN'T LAST AND YOU DIDN'T HAVE MONEY TO GET MORE.: NEVER TRUE

## 2024-07-30 SDOH — ECONOMIC STABILITY: INCOME INSECURITY: IN THE LAST 12 MONTHS, WAS THERE A TIME WHEN YOU WERE NOT ABLE TO PAY THE MORTGAGE OR RENT ON TIME?: NO

## 2024-07-30 SDOH — ECONOMIC STABILITY: FOOD INSECURITY: WITHIN THE PAST 12 MONTHS, YOU WORRIED THAT YOUR FOOD WOULD RUN OUT BEFORE YOU GOT MONEY TO BUY MORE.: NEVER TRUE

## 2024-07-30 SDOH — HEALTH STABILITY: PHYSICAL HEALTH: ON AVERAGE, HOW MANY DAYS PER WEEK DO YOU ENGAGE IN MODERATE TO STRENUOUS EXERCISE (LIKE A BRISK WALK)?: 0 DAYS

## 2024-07-30 SDOH — ECONOMIC STABILITY: HOUSING INSECURITY: IN THE LAST 12 MONTHS, HOW MANY PLACES HAVE YOU LIVED?: 1

## 2024-07-30 SDOH — HEALTH STABILITY: PHYSICAL HEALTH: ON AVERAGE, HOW MANY MINUTES DO YOU ENGAGE IN EXERCISE AT THIS LEVEL?: 0 MIN

## 2024-07-30 ASSESSMENT — SOCIAL DETERMINANTS OF HEALTH (SDOH)
HOW OFTEN DO YOU ATTENT MEETINGS OF THE CLUB OR ORGANIZATION YOU BELONG TO?: MORE THAN 4 TIMES PER YEAR
WITHIN THE LAST YEAR, HAVE YOU BEEN AFRAID OF YOUR PARTNER OR EX-PARTNER?: NO
IN THE PAST 12 MONTHS, HAS THE ELECTRIC, GAS, OIL, OR WATER COMPANY THREATENED TO SHUT OFF SERVICE IN YOUR HOME?: NO
WITHIN THE LAST YEAR, HAVE YOU BEEN KICKED, HIT, SLAPPED, OR OTHERWISE PHYSICALLY HURT BY YOUR PARTNER OR EX-PARTNER?: NO
WITHIN THE LAST YEAR, HAVE TO BEEN RAPED OR FORCED TO HAVE ANY KIND OF SEXUAL ACTIVITY BY YOUR PARTNER OR EX-PARTNER?: NO
HOW OFTEN DO YOU ATTEND CHURCH OR RELIGIOUS SERVICES?: MORE THAN 4 TIMES PER YEAR
WITHIN THE LAST YEAR, HAVE YOU BEEN HUMILIATED OR EMOTIONALLY ABUSED IN OTHER WAYS BY YOUR PARTNER OR EX-PARTNER?: NO
DO YOU BELONG TO ANY CLUBS OR ORGANIZATIONS SUCH AS CHURCH GROUPS UNIONS, FRATERNAL OR ATHLETIC GROUPS, OR SCHOOL GROUPS?: YES
HOW OFTEN DO YOU GET TOGETHER WITH FRIENDS OR RELATIVES?: MORE THAN THREE TIMES A WEEK
HOW HARD IS IT FOR YOU TO PAY FOR THE VERY BASICS LIKE FOOD, HOUSING, MEDICAL CARE, AND HEATING?: NOT HARD AT ALL
IN A TYPICAL WEEK, HOW MANY TIMES DO YOU TALK ON THE PHONE WITH FAMILY, FRIENDS, OR NEIGHBORS?: TWICE A WEEK

## 2024-07-30 ASSESSMENT — LIFESTYLE VARIABLES
AUDIT-C TOTAL SCORE: 0
SKIP TO QUESTIONS 9-10: 1
HOW OFTEN DO YOU HAVE A DRINK CONTAINING ALCOHOL: NEVER
HOW MANY STANDARD DRINKS CONTAINING ALCOHOL DO YOU HAVE ON A TYPICAL DAY: PATIENT DOES NOT DRINK
HOW OFTEN DO YOU HAVE SIX OR MORE DRINKS ON ONE OCCASION: NEVER

## 2024-07-30 ASSESSMENT — PATIENT HEALTH QUESTIONNAIRE - PHQ9: CLINICAL INTERPRETATION OF PHQ2 SCORE: 0

## 2024-08-07 ENCOUNTER — HOSPITAL ENCOUNTER (OUTPATIENT)
Dept: HEMATOLOGY ONCOLOGY | Facility: MEDICAL CENTER | Age: 79
End: 2024-08-07
Payer: MEDICARE

## 2024-08-07 VITALS
OXYGEN SATURATION: 97 % | TEMPERATURE: 97.2 F | BODY MASS INDEX: 25.34 KG/M2 | SYSTOLIC BLOOD PRESSURE: 138 MMHG | HEART RATE: 98 BPM | HEIGHT: 65 IN | WEIGHT: 152.12 LBS | DIASTOLIC BLOOD PRESSURE: 68 MMHG

## 2024-08-07 DIAGNOSIS — C50.812 MALIGNANT NEOPLASM OF OVERLAPPING SITES OF LEFT BREAST IN FEMALE, ESTROGEN RECEPTOR POSITIVE (HCC): ICD-10-CM

## 2024-08-07 DIAGNOSIS — Z17.0 MALIGNANT NEOPLASM OF OVERLAPPING SITES OF LEFT BREAST IN FEMALE, ESTROGEN RECEPTOR POSITIVE (HCC): ICD-10-CM

## 2024-08-07 DIAGNOSIS — Z79.811 AROMATASE INHIBITOR USE: ICD-10-CM

## 2024-08-07 PROCEDURE — 99212 OFFICE O/P EST SF 10 MIN: CPT

## 2024-08-07 PROCEDURE — 99214 OFFICE O/P EST MOD 30 MIN: CPT

## 2024-08-07 ASSESSMENT — ENCOUNTER SYMPTOMS
NECK PAIN: 1
VOMITING: 0
COUGH: 0
NAUSEA: 0
WEIGHT LOSS: 1
MYALGIAS: 1
DIARRHEA: 0
ABDOMINAL PAIN: 0
BACK PAIN: 1
CONSTIPATION: 1
SHORTNESS OF BREATH: 0

## 2024-08-07 ASSESSMENT — PAIN SCALES - GENERAL: PAINLEVEL: NO PAIN

## 2024-08-07 ASSESSMENT — FIBROSIS 4 INDEX: FIB4 SCORE: 1.93

## 2024-08-07 NOTE — ADDENDUM NOTE
Encounter addended by: Yonatan Kumar, Med Ass't on: 8/7/2024 11:19 AM   Actions taken: Charge Capture section accepted

## 2024-08-07 NOTE — PROGRESS NOTES
Subjective     Kaylee Nazario is a 78 y.o. female who presents for continued management of breast cancer, an 8-week follow-up after starting anastrozole.        Referring Physician: Meme Cox MD  Primary Care:  Phani Gutierrez M.D.     Diagnosis: Newly diagnosed HR positive HER2 negative left breast cancer     Chief Complaint: Newly diagnosed HR positive HER2 negative left breast cancer     History of Presenting Illness:  Kaylee Nazario is a 78 y.o. female who underwent routine mammography which showed an 8 mm mass in the upper outer quadrant of the left breast confirmed by ultrasound.  Ultrasound-guided biopsy on 3/19/2024 showed invasive ductal carcinoma, grade 2, tumor infiltrating lymphocytes 5%, no lymph vascular invasion, ER positive greater than 90%, OR positive greater than 90%, Ki-67 less than 10%, HER2 IHC 1+.  She saw Dr. Pryor in consultation and underwent a left partial mastectomy without sentinel lymph node biopsy.  Pathology demonstrated invasive ductal carcinoma grade 2, 1.2 cm greatest dimension, perineural invasion was present.  Surgical margins were clear.  Postoperative course was unremarkable.  She underwent partial breast radiation 5 fractions and is completing it today.  She has not had any problems from this.  She is a retired psychiatric nurse and has been in good health for age with type 2 diabetes and hypertension under control.  She has had 1 knee replacement and has arthritis in the other knee.  No family history of breast cancer.     8/7/2024 interval history (Amy Villasenor, AOASH):  Patient returns to clinic today for follow-up appointment.  Patient states she did start taking the anastrozole on 6/12/2020 for, but she did not tolerate it well.  On 6/18/2024 she woke up with unbearable pain in her lower back/upper buttocks, which required transportation by ambulance to the ER.  While in the ER, she was given a CT scan, and told the results were negative.  She  received a tramadol injection and a prescription for Flexeril 10 mg 3 times daily.  She discontinued the anastrozole at this time.  She states the pain lessened on 6/30/2024.  On 7/5/2024 the pain returned in the same area and lasted approximately 4 days.  On 7/10/2024 the pain moved to the right middle back and upper scapula/shoulder blade.  On 7/12/2024 she was awakened with severe pain in her neck and unable to turn her head to the right or left due to pain.  She stated this lasted about 3 days.  She has had no pain since her last incident on 7/12/2024.  She also developed constipation while on the anastrozole, which has not improved since stopping the medication.  She has been given Linzess by her PCP, which she states does help significantly.  She continues to have tenderness/pain in her left breast at the site of the radiation.  She states the skin is healing, but is still very tender.  She also has ongoing arthritic pain in her right knee.  She has been told that she needs a replacement, but she does not wish to do it at this time, as it took her more than 1 year to recover from her left knee replacement.  She reports that overall her energy levels are good, however her mobility has been limited due to her discomfort.  She also has experienced weight loss since she had COVID earlier this year, as she notes the taste changes related to the illness.  No other complaints or concerns provided.    Treatment History:  4/19/2024 Left partial mastectomy  6/6/2024 - 6/11/2024 Radiation to left breast x 5 treatments  6/12/2024 Started anastrozole   6/18/2024 -  discontinued due to severe muscle/back pain    Past Medical History:   Diagnosis Date    Arthritis      Basal cell carcinoma of skin      Fbkkjmkpf-Yjvrquk-Hgqone disease 06/08/2016    Cwyfinzwj-Zmglubf-Gmiuet disease 06/08/2016    Buttock pain 05/24/2019    Cataract 10/2014     Left  and Right Eye IOL    Constipation 11/06/2017    Dyslipidemia 04/12/2016     Essential hypertension 04/12/2016    Hypertension      Hypothyroidism      IBD (inflammatory bowel disease)      Impaired fasting blood sugar      Malignant neoplasm of overlapping sites of left breast in female, estrogen receptor positive (HCC)      Osteopenia      Trigger finger, acquired 12/26/2012      ICD-10 transition    Type 2 diabetes mellitus with hyperglycemia, without long-term current use of insulin (HCC) 04/03/2023     pt reports that dr put her on this profolactiley            Past Surgical History         Past Surgical History:   Procedure Laterality Date    PB MASTECTOMY, PARTIAL Left 04/19/2024     Procedure: LEFT PARTIAL MASTECTOMY;  Surgeon: Meme Altamirano M.D.;  Location: SURGERY SAME DAY [facility];  Service: General    PB MANIPULATN KNEE JT+ANESTHESIA Left 06/06/2022     Procedure: LEFT KNEE MANIPULATION;  Surgeon: Bean Escobar M.D.;  Location: [facility];  Service: Orthopedics    PB TOTAL KNEE ARTHROPLASTY Left 04/04/2022     Procedure: LEFT TOTAL KNEE ARTHROPLASTY;  Surgeon: Bean Escobar M.D.;  Location: [facility];  Service: Orthopedics    PB INCISE FINGER TENDON SHEATH Right 10/26/2021     Procedure: RIGHT THUMB TRIGGER RELEASE;  Surgeon: Brett Maddox M.D.;  Location: [facility];  Service: Orthopedics    KNEE ARTHROSCOPY Right 06/08/2016     Procedure: KNEE ARTHROSCOPY, MEDIAL MENISECTOMY, DEBRIEDMENT OF LATERAL  FEMORAL CONDYLE;  Surgeon: Real Carias M.D.;  Location: [facility];  Service:     LATERAL RELEASE Right 06/08/2016     Procedure: LATERAL RELEASE;  Surgeon: Real Carias M.D.;  Location: [facility];  Service:     TRIGGER FINGER RELEASE Bilateral 04/19/2016     Procedure: TRIGGER FINGER RELEASE-RING;  Surgeon: Brett Maddox M.D.;  Location: [facility];  Service:     TRIGGER FINGER RELEASE   12/26/2012     Performed by Brett Maddox M.D. at SURGERY  [facility]    COLONOSCOPY   08/01/2011    CATARACT EXTRACTION WITH IOL Left 03/01/2009     left    TUBAL LIGATION   09/01/1980    APPENDECTOMY        BREAST BIOPSY         multiple breast biopsies    KNEE REPLACEMENT, TOTAL Left      LUMPECTOMY         x 2 or 3 in the 1980's    TONSILLECTOMY   as child            Social History            Tobacco Use    Smoking status: Never    Smokeless tobacco: Never   Vaping Use    Vaping status: Never Used   Substance Use Topics    Alcohol use: No       Alcohol/week: 0.0 oz    Drug use: No         Family History         Family History   Problem Relation Age of Onset    Other Mother           MULTI SYSTEM FAILURE    Stroke Father      Hyperlipidemia Brother      Other Brother           aortic aneurysm repair    Cancer Maternal Aunt           Kidney ca    Kidney cancer Maternal Aunt 70 - 79        Allergies   Allergen Reactions    Sulfa Drugs Anaphylaxis and Shortness of Breath    Anastrozole Unspecified     Extreme pain      Current Outpatient Medications on File Prior to Encounter   Medication Sig Dispense Refill    linaCLOtide (LINZESS) 145 MCG Cap TAKE 1 CAPSULE BY MOUTH ONCE DAILY AS NEEDED 90 Capsule 1    metFORMIN ER (GLUCOPHAGE XR) 500 MG TABLET SR 24 HR Take 1 Tablet by mouth 2 times a day. 200 Tablet 3    amLODIPine (NORVASC) 10 MG Tab Take 1 Tablet by mouth every day. 100 Tablet 3    furosemide (LASIX) 20 MG Tab Take 1 Tablet by mouth every day. (Patient taking differently: Take 20 mg by mouth every day. Taking as needed) 90 Tablet 3    levothyroxine (SYNTHROID) 100 MCG Tab Take 1 Tablet by mouth every morning on an empty stomach. 90 Tablet 3    atorvastatin (LIPITOR) 20 MG Tab Take 1 Tablet by mouth every evening. 100 Tablet 3    lidocaine (XYLOCAINE) 2 % Solution Take 10 mL by mouth as needed for Throat/Mouth Pain. 100 mL 6    timolol (TIMOPTIC) 0.5 % Solution Administer 1 Drop into both eyes 2 times a day.      latanoprost (XALATAN) 0.005 % Solution Administer  1 Drop into both eyes every evening.      MAGNESIUM PO Take  by mouth.      anastrozole (ARIMIDEX) 1 MG Tab Take 1 Tablet by mouth every day. (Patient not taking: Reported on 7/30/2024) 90 Tablet 1    Calcium Carb-Cholecalciferol (CALCIUM 500/D) 500-10 MG-MCG Chew Tab Chew every evening.       No current facility-administered medications on file prior to encounter.        6/18/2024 9:21 PM     HISTORY/REASON FOR EXAM: Low Back Pain; lower back pain , hx of malignancy     TECHNIQUE/EXAM DESCRIPTION:  CT of the lumbar spine without contrast, with reconstructions.     Transaxial scans of the lumbar spine without IV contrast. Sagittal reconstruction was performed.     Low dose optimization technique was utilized for this CT exam including automated exposure control and adjustment of the mA and/or kV according to patient size.     COMPARISON: None     FINDINGS:     Dextroscoliosis is seen. There is 12 mm left lateral subluxation of L3 on L4. Multilevel severe degenerative changes of the lumbar spine are observed. No fracture is identified. Vertebral body height is well maintained.     The prevertebral soft tissues and paraspinal soft tissues appear within normal limits.   The visualized thoracic and abdominal soft tissues appear within physiologic limits. Scattered colonic diverticula are seen.     Atherosclerotic changes are seen.     IMPRESSION:        1.  No acute traumatic bony injury of the lumbar spine.  2.  Diverticulosis  3.  Atherosclerosis      6/27/2024 5:31 PM     HISTORY/REASON FOR EXAM:  Postmenopausal, osteopenia of the left femur, breast cancer, long term aromatase, history of fracture     TECHNIQUE/EXAM DESCRIPTION AND NUMBER OF VIEWS:  Dual x-ray bone densitometry was performed from the L1 and L4 levels and from the proximal left femur utilizing the GE Prodigy unit.     COMPARISON: Bone densitometry scan 11/13/2015     FINDINGS:  The lumbar spine has a mean bone mineral density of 1.443 g/cm2, with a T  "score of 2.3 and a Z score of 4.0.     The proximal left femur has a mean bone mineral density of 0.731 g/cm2, with a T score of -2.2 and a Z score of -0.3.     When compared with the most recent study dated 11/13/2015, there has been a 10.7% increase in the bone mineral density of the lumbar spine and a 12.7% decrease in the bone mineral density of the left femur.     IMPRESSION:     According to the World Health Organization classification, bone mineral density of this patient is normal in the lumbar spine and osteopenic in the proximal left femur.     10-year Probability of Fracture:  Major Osteoporotic     32.3%  Hip     12.1%  Population      [place] ()     Based on left femur neck BMD       Review of Systems   Constitutional:  Positive for weight loss. Negative for malaise/fatigue.   Respiratory:  Negative for cough and shortness of breath.    Gastrointestinal:  Positive for constipation. Negative for abdominal pain, diarrhea, nausea and vomiting.   Musculoskeletal:  Positive for back pain, joint pain, myalgias and neck pain.     Skin: Positive for dry, flaking, healing area from radiation to Lbreast.         Objective     /68 (BP Location: Right arm, Patient Position: Sitting, BP Cuff Size: Adult)   Pulse 98   Temp 36.2 °C (97.2 °F) (Temporal)   Ht 1.651 m (5' 5\")   Wt 69 kg (152 lb 1.9 oz)   SpO2 97%   BMI 25.31 kg/m²                   Physical Exam  Constitutional:       Appearance: Normal appearance.   HENT:      Head: Normocephalic.      Eyes:      Extraocular Movements: Extraocular movements intact.      Conjunctiva/sclera: Conjunctivae normal.      Pupils: Pupils are equal, and round.   Cardiovascular:      Rate and Rhythm: Normal rate and regular rhythm.      Pulses: Normal pulses.   Pulmonary:      Effort: Pulmonary effort is normal.      Breath sounds: Normal breath sounds.   Chest:      Comments: Good cosmetic result in the left breast after lumpectomy and partial breast radiation.  " Left breast has approximately 3 x 3 cm area of skin dryness, flakiness and tenderness in the area from previous radiation treatment.  Patient states that skin is slowly healing and the tenderness is slowly decreasing.  Both axilla without adenopathy.  Abdominal:      General: Abdomen is flat. Bowel sounds are normal.      Palpations: Abdomen is soft. There is no mass.   Musculoskeletal:         General: Normal range of motion.  Using cane for ambulation     Comments: Status post left knee replacement with arthritis in both legs.   Skin:     General: Skin is warm.   Neurological:      General: No focal deficit present.      Mental Status: She is alert and oriented to person, place, and time.   Psychiatric:         Mood and Affect: Mood normal.         Behavior: Behavior normal.                    Assessment & Plan          1.  Invasive ductal carcinoma of the left breast, grade 2, stage I (pT1c, PN X) ER positive greater than 90%, NV positive greater than 90%, Ki-67 less than 10%, HER2 1+ IHC status post partial mastectomy without sentinel lymph node biopsy and status post APBI.  She is a good candidate for endocrine therapy with anastrozole.  Risks and benefits were discussed in full with the patient and she agrees.  2.  6/12/2024 started endocrine therapy with anastrozole.  Discontinued 6/18/2024 due to severe back and muscle pains.  3.  Osteopenia noted on 6/28/2024 DEXA scan     Plan:   -Continue to remain off endocrine therapy at this time, as patient is afraid that the muscle pain will return and she continues to have issues with constipation.  Discussed possibility of switching to an alternative treatment such as tamoxifen.  Patient states at this time she does not feel she would like to receive any additional endocrine therapy, however we will plan to meet again in 3 months to discuss the possibility of an alternative medication.  -Encourage patient to continue with calcium and vitamin D supplements daily.   Also encourage patient to engage in approximately 30 minutes of weightbearing exercise, but only as tolerated.  Also discussed degenerative exercise options, due to her chronic knee pain.  Next DEXA scan due 6/28/2026.  -Next diagnostic mammogram due 3/20/2025  -Patient to return to clinic in 3 months to assess joint pain off endocrine therapy, and discussed possibility of alternative medications.    Plan of care reviewed with patient all questions answered.  Patient verbalizes understanding, denies questions, and agrees with plan of care.  Patient encouraged to call or return to clinic with any questions or concerns.

## 2024-08-30 ENCOUNTER — PATIENT OUTREACH (OUTPATIENT)
Dept: HEALTH INFORMATION MANAGEMENT | Facility: OTHER | Age: 79
End: 2024-08-30
Payer: MEDICARE

## 2024-08-30 DIAGNOSIS — I10 PRIMARY HYPERTENSION: ICD-10-CM

## 2024-08-30 DIAGNOSIS — G89.29 CHRONIC PAIN OF RIGHT KNEE: ICD-10-CM

## 2024-08-30 DIAGNOSIS — C50.812 MALIGNANT NEOPLASM OF OVERLAPPING SITES OF LEFT BREAST IN FEMALE, ESTROGEN RECEPTOR POSITIVE (HCC): ICD-10-CM

## 2024-08-30 DIAGNOSIS — E78.5 DYSLIPIDEMIA: ICD-10-CM

## 2024-08-30 DIAGNOSIS — E11.9 TYPE 2 DIABETES MELLITUS WITHOUT COMPLICATION, WITHOUT LONG-TERM CURRENT USE OF INSULIN (HCC): ICD-10-CM

## 2024-08-30 DIAGNOSIS — Z17.0 MALIGNANT NEOPLASM OF OVERLAPPING SITES OF LEFT BREAST IN FEMALE, ESTROGEN RECEPTOR POSITIVE (HCC): ICD-10-CM

## 2024-08-30 DIAGNOSIS — M25.561 CHRONIC PAIN OF RIGHT KNEE: ICD-10-CM

## 2024-09-03 ENCOUNTER — DOCUMENTATION (OUTPATIENT)
Dept: HEALTH INFORMATION MANAGEMENT | Facility: OTHER | Age: 79
End: 2024-09-03
Payer: MEDICARE

## 2024-09-05 ENCOUNTER — TELEPHONE (OUTPATIENT)
Dept: HEMATOLOGY ONCOLOGY | Facility: MEDICAL CENTER | Age: 79
End: 2024-09-05

## 2024-09-05 NOTE — TELEPHONE ENCOUNTER
Patient called to cancel appointment that was scheduled for 11/13/24.    Patient made it clear to Amy Villasenor at the last visit, that she is not planning on returning to this clinic, ever.

## 2024-09-05 NOTE — PROGRESS NOTES
Three attempts at monthly outreach follow up. No answer, left messages. Will follow up at next monthly outreach.

## 2024-09-18 ENCOUNTER — PATIENT OUTREACH (OUTPATIENT)
Dept: HEALTH INFORMATION MANAGEMENT | Facility: OTHER | Age: 79
End: 2024-09-18
Payer: MEDICARE

## 2024-09-18 DIAGNOSIS — G89.29 CHRONIC PAIN OF RIGHT KNEE: ICD-10-CM

## 2024-09-18 DIAGNOSIS — E78.5 DYSLIPIDEMIA: ICD-10-CM

## 2024-09-18 DIAGNOSIS — M25.561 CHRONIC PAIN OF RIGHT KNEE: ICD-10-CM

## 2024-09-18 DIAGNOSIS — I10 PRIMARY HYPERTENSION: ICD-10-CM

## 2024-09-18 DIAGNOSIS — E11.9 TYPE 2 DIABETES MELLITUS WITHOUT COMPLICATION, WITHOUT LONG-TERM CURRENT USE OF INSULIN (HCC): ICD-10-CM

## 2024-09-19 NOTE — PROGRESS NOTES
"Assessment  Spoke with Kaylee for monthly outreach follow up. Kaylee states she's \"not too bad.\" Reviewed follow up schedule with Oncology. She does not want to continue to be seen at by her currently oncologist. Encouraged her to reach out to her PCP to discuss follow up options given history of breast cancer. Reviewed pending lab orders and next PCP appointment. Kaylee denied any needs and asked to end the phone call. Encouraged to call with any questions or concerns.     Education and Self Management of Care  Dicussed pending lab orders  Discussed upcoming PCP appointment  Discussed follow up with oncology and surgery     Plan of Care and Goals  Continue hypertension education  Continue to maintain current health status/maintain weight loss     Barriers:  Age  progression of disease process   chronic knee pain  knowledge of healthcare  habits     Progress:  Unable to assess, unable to reach at last monthly outreach/short call at this month.    Next outreach:  1 month     Chart reviewed for Quarterly Assessment, Difficult to assess Kaylee's progress at this time. However, she does qualify and could benefit from PCM program. Continue to offer education and support.                   "

## 2024-09-24 ENCOUNTER — HOSPITAL ENCOUNTER (OUTPATIENT)
Dept: LAB | Facility: MEDICAL CENTER | Age: 79
End: 2024-09-24
Attending: STUDENT IN AN ORGANIZED HEALTH CARE EDUCATION/TRAINING PROGRAM
Payer: MEDICARE

## 2024-09-24 ENCOUNTER — OFFICE VISIT (OUTPATIENT)
Dept: SURGERY | Facility: MEDICAL CENTER | Age: 79
End: 2024-09-24
Payer: MEDICARE

## 2024-09-24 VITALS
BODY MASS INDEX: 25.49 KG/M2 | TEMPERATURE: 97.4 F | OXYGEN SATURATION: 96 % | HEIGHT: 65 IN | DIASTOLIC BLOOD PRESSURE: 79 MMHG | SYSTOLIC BLOOD PRESSURE: 127 MMHG | HEART RATE: 78 BPM | WEIGHT: 153 LBS

## 2024-09-24 DIAGNOSIS — E03.9 HYPOTHYROIDISM, UNSPECIFIED TYPE: ICD-10-CM

## 2024-09-24 DIAGNOSIS — C50.812 MALIGNANT NEOPLASM OF OVERLAPPING SITES OF LEFT BREAST IN FEMALE, ESTROGEN RECEPTOR POSITIVE (HCC): ICD-10-CM

## 2024-09-24 DIAGNOSIS — Z17.0 MALIGNANT NEOPLASM OF OVERLAPPING SITES OF LEFT BREAST IN FEMALE, ESTROGEN RECEPTOR POSITIVE (HCC): ICD-10-CM

## 2024-09-24 PROCEDURE — 99213 OFFICE O/P EST LOW 20 MIN: CPT | Performed by: SURGERY

## 2024-09-24 PROCEDURE — 84443 ASSAY THYROID STIM HORMONE: CPT

## 2024-09-24 PROCEDURE — 36415 COLL VENOUS BLD VENIPUNCTURE: CPT

## 2024-09-24 PROCEDURE — 84439 ASSAY OF FREE THYROXINE: CPT

## 2024-09-24 PROCEDURE — 3074F SYST BP LT 130 MM HG: CPT | Performed by: SURGERY

## 2024-09-24 PROCEDURE — 1170F FXNL STATUS ASSESSED: CPT | Performed by: SURGERY

## 2024-09-24 PROCEDURE — 3078F DIAST BP <80 MM HG: CPT | Performed by: SURGERY

## 2024-09-24 RX ORDER — ASPIRIN 81 MG/1
81 TABLET, CHEWABLE ORAL DAILY
COMMUNITY

## 2024-09-24 ASSESSMENT — FIBROSIS 4 INDEX: FIB4 SCORE: 1.93

## 2024-09-24 NOTE — PROGRESS NOTES
"    SUBJECTIVE:   Kaylee Nazario is a delightful 78 y.o. female who presents in follow up for left breast cancer.  She is status post partial mastectomy (SLNBx omitted) for IDC.  Currently, she reports mild discomfort in the left lateral breast; she is not able to lie on her left side due to this.  There are no changes in her functional status and she is overall doing well.  She completed APBI and had severe myalgias with anastrozole, which she stopped.    Interval Imaging:  No new breast imaging.    OBJECTIVE:  /79 (BP Location: Right arm, Patient Position: Sitting, BP Cuff Size: Large adult)   Pulse 78   Temp 36.3 °C (97.4 °F) (Temporal)   Ht 1.651 m (5' 5\")   Wt 69.4 kg (153 lb)   SpO2 96%   BMI 25.46 kg/m²    General: Alert, oriented, pleasant, no acute distress.  HEENT: Extraocular movements intact, no scleral icterus or conjunctival injection.  Chest: Respirations unlabored on room air.  Abdomen: Soft, nondistended.  Extremities: Warm, well-perfused.  Breasts: Bilateral breasts examined in the upright and supine positions.  No suspicious skin findings on either side (erythema, peau d'orange).  Well-healed left lateral radial incision with good contour.  Bilateral breasts are heterogeneously dense with dominant nodularity in the left lateral breast just superomedial to the incision.  Bedside ultrasound performed with the GE 12mHz linear array probe demonstrating a small simple fluid collection consistent with seroma; no shadowing or unexpected distortion.  Bilateral nipples everted without expressible discharge.  No unexpected contour abnormalities.  No palpable cervical, supraclavicular, or axillary adenopathy.     [facility] ECOG Performance Status categories: 0= Fully active, able to carry on all pre-disease performance without restriction.    ASSESSMENT AND PLAN:  Delightful 78 y.o. female, here for follow-up of left breast cancer.  Currently she is doing well, without evidence of malignancy.  " She has expressed that she never wants to return to the [facility] oncology office, so I offered referral to [facility] to review the risks/benefits of endocrine therapy.  She is not interested in referral at this time.  We will continue to monitor.  - Next clinical breast exam: 2025, 2025, 2026  - Next breast imagin2025 B dMMG, 2026 B sMMG    Problem   Malignant Neoplasm of Overlapping Sites of Left Breast in Female, Estrogen Receptor Positive (Hcc)    Left breast 03:00 9cmFN IDC, G2, ER+AL+HER2-, Ki67 <10%, pT1c cN0M0 (justa/prog IA)  - US-guided CNBx 2024  - Left partial mastectomy, omission of axillary sampling due to age/favorable tumor 2024 ([place])  - Accelerated partial breast irradiation ([place])  - Stopped anastrozole due to muscle pain ([place])          Cancer Staging   Malignant neoplasm of overlapping sites of left breast in female, estrogen receptor positive (HCC)  Staging form: Breast, AJCC 8th Edition  - Clinical stage from 3/21/2024: cT1b, cN0, cM0, G2, ER+, AL+, HER2: Unknown - Signed by Meme Altamirano M.D. on 3/21/2024  - Pathologic stage from 2024: pT1c, cN0, cM0, G2, ER+, AL+, HER2- - Signed by Meme Altamirano M.D. on 2024

## 2024-09-25 LAB
T4 FREE SERPL-MCNC: 1.71 NG/DL (ref 0.93–1.7)
TSH SERPL DL<=0.005 MIU/L-ACNC: 0.27 UIU/ML (ref 0.38–5.33)

## 2024-10-17 ENCOUNTER — APPOINTMENT (OUTPATIENT)
Dept: MEDICAL GROUP | Facility: MEDICAL CENTER | Age: 79
End: 2024-10-17
Payer: MEDICARE

## 2024-10-17 VITALS
SYSTOLIC BLOOD PRESSURE: 118 MMHG | DIASTOLIC BLOOD PRESSURE: 64 MMHG | HEART RATE: 77 BPM | WEIGHT: 150 LBS | TEMPERATURE: 97.2 F | OXYGEN SATURATION: 97 % | HEIGHT: 65 IN | BODY MASS INDEX: 24.99 KG/M2

## 2024-10-17 DIAGNOSIS — E11.9 TYPE 2 DIABETES MELLITUS WITHOUT COMPLICATION, WITHOUT LONG-TERM CURRENT USE OF INSULIN (HCC): ICD-10-CM

## 2024-10-17 DIAGNOSIS — E03.9 HYPOTHYROIDISM, UNSPECIFIED TYPE: ICD-10-CM

## 2024-10-17 LAB
HBA1C MFR BLD: 5.9 % (ref ?–5.8)
POCT INT CON NEG: NEGATIVE
POCT INT CON POS: POSITIVE

## 2024-10-17 PROCEDURE — 83036 HEMOGLOBIN GLYCOSYLATED A1C: CPT | Performed by: STUDENT IN AN ORGANIZED HEALTH CARE EDUCATION/TRAINING PROGRAM

## 2024-10-17 PROCEDURE — 3078F DIAST BP <80 MM HG: CPT | Performed by: STUDENT IN AN ORGANIZED HEALTH CARE EDUCATION/TRAINING PROGRAM

## 2024-10-17 PROCEDURE — 3074F SYST BP LT 130 MM HG: CPT | Performed by: STUDENT IN AN ORGANIZED HEALTH CARE EDUCATION/TRAINING PROGRAM

## 2024-10-17 PROCEDURE — 99214 OFFICE O/P EST MOD 30 MIN: CPT | Performed by: STUDENT IN AN ORGANIZED HEALTH CARE EDUCATION/TRAINING PROGRAM

## 2024-10-17 PROCEDURE — 1170F FXNL STATUS ASSESSED: CPT | Performed by: STUDENT IN AN ORGANIZED HEALTH CARE EDUCATION/TRAINING PROGRAM

## 2024-10-17 ASSESSMENT — ENCOUNTER SYMPTOMS
WEIGHT LOSS: 0
FEVER: 0
PALPITATIONS: 0
SHORTNESS OF BREATH: 0
CHILLS: 0
VOMITING: 0
DIZZINESS: 0
HEADACHES: 0
NAUSEA: 0
WHEEZING: 0

## 2024-10-17 ASSESSMENT — FIBROSIS 4 INDEX: FIB4 SCORE: 1.93

## 2024-10-18 ENCOUNTER — PATIENT OUTREACH (OUTPATIENT)
Dept: HEALTH INFORMATION MANAGEMENT | Facility: OTHER | Age: 79
End: 2024-10-18
Payer: MEDICARE

## 2024-10-18 DIAGNOSIS — Z17.0 MALIGNANT NEOPLASM OF OVERLAPPING SITES OF LEFT BREAST IN FEMALE, ESTROGEN RECEPTOR POSITIVE (HCC): ICD-10-CM

## 2024-10-18 DIAGNOSIS — G89.29 CHRONIC PAIN OF RIGHT KNEE: ICD-10-CM

## 2024-10-18 DIAGNOSIS — C50.812 MALIGNANT NEOPLASM OF OVERLAPPING SITES OF LEFT BREAST IN FEMALE, ESTROGEN RECEPTOR POSITIVE (HCC): ICD-10-CM

## 2024-10-18 DIAGNOSIS — E78.5 DYSLIPIDEMIA: ICD-10-CM

## 2024-10-18 DIAGNOSIS — E11.9 TYPE 2 DIABETES MELLITUS WITHOUT COMPLICATION, WITHOUT LONG-TERM CURRENT USE OF INSULIN (HCC): ICD-10-CM

## 2024-10-18 DIAGNOSIS — I10 PRIMARY HYPERTENSION: ICD-10-CM

## 2024-10-18 DIAGNOSIS — M25.561 CHRONIC PAIN OF RIGHT KNEE: ICD-10-CM

## 2024-10-21 ENCOUNTER — PATIENT MESSAGE (OUTPATIENT)
Dept: HEALTH INFORMATION MANAGEMENT | Facility: OTHER | Age: 79
End: 2024-10-21

## 2024-10-21 RX ORDER — CYCLOBENZAPRINE HCL 10 MG
10 TABLET ORAL 3 TIMES DAILY PRN
Qty: 90 TABLET | Refills: 1 | Status: SHIPPED | OUTPATIENT
Start: 2024-10-21

## 2024-10-21 RX ORDER — CYCLOBENZAPRINE HCL 10 MG
10 TABLET ORAL 3 TIMES DAILY PRN
COMMUNITY
Start: 2024-08-05 | End: 2024-10-21 | Stop reason: SDUPTHER

## 2024-11-21 ENCOUNTER — PATIENT OUTREACH (OUTPATIENT)
Dept: HEALTH INFORMATION MANAGEMENT | Facility: OTHER | Age: 79
End: 2024-11-21
Payer: MEDICARE

## 2024-11-21 DIAGNOSIS — Z17.0 MALIGNANT NEOPLASM OF OVERLAPPING SITES OF LEFT BREAST IN FEMALE, ESTROGEN RECEPTOR POSITIVE (HCC): ICD-10-CM

## 2024-11-21 DIAGNOSIS — E11.9 TYPE 2 DIABETES MELLITUS WITHOUT COMPLICATION, WITHOUT LONG-TERM CURRENT USE OF INSULIN (HCC): ICD-10-CM

## 2024-11-21 DIAGNOSIS — G89.29 CHRONIC PAIN OF RIGHT KNEE: ICD-10-CM

## 2024-11-21 DIAGNOSIS — E78.5 DYSLIPIDEMIA: ICD-10-CM

## 2024-11-21 DIAGNOSIS — C50.812 MALIGNANT NEOPLASM OF OVERLAPPING SITES OF LEFT BREAST IN FEMALE, ESTROGEN RECEPTOR POSITIVE (HCC): ICD-10-CM

## 2024-11-21 DIAGNOSIS — I10 PRIMARY HYPERTENSION: ICD-10-CM

## 2024-11-21 DIAGNOSIS — M25.561 CHRONIC PAIN OF RIGHT KNEE: ICD-10-CM

## 2024-11-25 NOTE — PROGRESS NOTES
Kaylee left a message reporting a painful lump in her breast. Attempted to return call. No answer left message.

## 2024-11-26 DIAGNOSIS — E78.5 DYSLIPIDEMIA: ICD-10-CM

## 2024-11-26 RX ORDER — ATORVASTATIN CALCIUM 20 MG/1
20 TABLET, FILM COATED ORAL NIGHTLY
Qty: 100 TABLET | Refills: 3 | Status: SHIPPED | OUTPATIENT
Start: 2024-11-26

## 2024-11-27 NOTE — CARE PLAN
Problem: Knowledge deficit of diabetes  Goal: Patient educated about diabetes/long term   Outcome: Progressing  Intervention: Patient will, In conjunction with their medical provider, identify goal A1c     Problem: Knowledge deficit surrounding fall safety  Goal: Demonstrate ability to verbalize fall safety concerns/long term   Outcome: Progressing  Intervention: Educate on the proper use of assistive devices (e.g., canes, walkers)

## 2024-11-27 NOTE — PROGRESS NOTES
"Assessment  Spoke with Kaylee for monthly outreach follow-up.  Multiple calls placed between RN care coordinator and Kaylee, she reports difficulty with her phone and states she does not receive calls.  Reviewed her previous voicemail reporting painful lump at her breast.  She reports she has followed up with oncology in September which included an in office ultrasound and this lump is a fluid collection that should resolve over time.  Instructed Kaylee not to hesitate to follow-up with oncology for any changes or concerns given her history of breast cancer.  She discussed chronic pain to her knee for which she declines surgery at this time.  She continues to use a cane.  Discussed fall prevention. Reviewed her last A1c of 5.9 on October 17.  Discussed her blood pressure 118/64 at her last office visit.  She feels that her \"numbers\" are good.  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  Her grandson is currently living with her and her  and he helps out around the house with housework.  She denies any needs at this time.  Encouraged to call with any questions or concerns    Education and Self-Management of Care  See care plan note     Plan of Care and Goals  Updated plan of care format  Added diabetes education  Added hypertension education   Added fall prevention     Barriers:  Age  progression of disease process   chronic knee pain  knowledge of healthcare  habits   Difficulty with phone and connecting for monthly outreach follow    Progress:  Progressing     Next outreach:  December 20204  "

## 2024-12-02 DIAGNOSIS — E03.9 HYPOTHYROIDISM, UNSPECIFIED TYPE: ICD-10-CM

## 2024-12-02 RX ORDER — LEVOTHYROXINE SODIUM 100 UG/1
100 TABLET ORAL
Qty: 90 TABLET | Refills: 3 | Status: SHIPPED | OUTPATIENT
Start: 2024-12-02

## 2024-12-02 NOTE — TELEPHONE ENCOUNTER
Received request via: Pharmacy    Was the patient seen in the last year in this department? Yes    Does the patient have an active prescription (recently filled or refills available) for medication(s) requested? No    Pharmacy Name:   [facility]/pharmacy #[id number] - [place], [place] - [address]  [address] [address]  Phone: [phone] Fax: [phone]       Does the patient have [facility] and need 100-day supply? (This applies to ALL medications) Yes, quantity updated to 100 days

## 2024-12-27 ENCOUNTER — PATIENT MESSAGE (OUTPATIENT)
Dept: HEALTH INFORMATION MANAGEMENT | Facility: OTHER | Age: 79
End: 2024-12-27

## 2024-12-27 ENCOUNTER — PATIENT OUTREACH (OUTPATIENT)
Dept: HEALTH INFORMATION MANAGEMENT | Facility: OTHER | Age: 79
End: 2024-12-27
Payer: MEDICARE

## 2024-12-27 DIAGNOSIS — E11.9 TYPE 2 DIABETES MELLITUS WITHOUT COMPLICATION, WITHOUT LONG-TERM CURRENT USE OF INSULIN (HCC): ICD-10-CM

## 2024-12-27 DIAGNOSIS — M25.561 CHRONIC PAIN OF RIGHT KNEE: ICD-10-CM

## 2024-12-27 DIAGNOSIS — Z17.0 MALIGNANT NEOPLASM OF OVERLAPPING SITES OF LEFT BREAST IN FEMALE, ESTROGEN RECEPTOR POSITIVE (HCC): ICD-10-CM

## 2024-12-27 DIAGNOSIS — C50.812 MALIGNANT NEOPLASM OF OVERLAPPING SITES OF LEFT BREAST IN FEMALE, ESTROGEN RECEPTOR POSITIVE (HCC): ICD-10-CM

## 2024-12-27 DIAGNOSIS — E78.5 DYSLIPIDEMIA: ICD-10-CM

## 2024-12-27 DIAGNOSIS — I10 PRIMARY HYPERTENSION: ICD-10-CM

## 2024-12-27 DIAGNOSIS — G89.29 CHRONIC PAIN OF RIGHT KNEE: ICD-10-CM

## 2024-12-27 PROCEDURE — 99999 PR NO CHARGE: CPT | Performed by: STUDENT IN AN ORGANIZED HEALTH CARE EDUCATION/TRAINING PROGRAM

## 2024-12-30 ENCOUNTER — PATIENT MESSAGE (OUTPATIENT)
Dept: HEALTH INFORMATION MANAGEMENT | Facility: OTHER | Age: 79
End: 2024-12-30

## 2025-01-03 NOTE — CARE PLAN
"  Problem: Knowledge deficit of diabetes  Goal: Patient educated about diabetes/long term -11/27/24-5/31/25  Outcome: Progressing  Intervention: Using the \"What is Diabetes?\" handout, define diabetes in lay terms  Intervention: Using the \"What is Diabetes?\" handout, educate patient on health effects of diabetes  Intervention: Using the \"What is Diabetes?\" handout, educate patient on what the A1c means     Problem: Risk of Falls  Goal: Reduce the Risk of Falls and Fall Related Injuries/long term 11/27/24-5/31/25  Outcome: Progressing  Intervention: Recommend exercises to improve strength and balance (physical therapy)  Intervention: Encourage regular physical activity tailored to the patient's abilities     "

## 2025-01-03 NOTE — PROGRESS NOTES
Assessment  Spoke with Kaylee for monthly outreach follow up. Kaylee reports her arthritis pain in her hands and knees are bothering her. Offered to assist in making a PCP appointment. She declined and stated she would keep her appointment on 4/21. 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 She is worried she will have to stop playing the organ. Encouraged Kaylee to try and rest her hands and possibly play less frequently. She states orthopedics has recommended a knee replacement, she declines at this time. Discussed fall prevention. Encouraged her follow up with PCP if she does not improve. She report the lump to her breast has resolved and is no longer present. Discussed her previous treatment course for breast cancer. Discussed diet and nutritions. Kaylee reports she has lost 45 pounds over the last year and a half. She feels her weight has stabilized now. She states since COVID she has lost her taste and has no appetite. Will mail out [employer] Diabetes packet which offers great nutritional information. She agreed will review at next monthly outreach follow up. Also mailed out fall prevention information to benefit both her and her . Kaylee denied any other needs at this time. Encouraged to call with any questions or concerns.     Education and Self-Management of Care  Discussed diet and nutrition-mail out [facility] packet  Mail out fall prevention resources     Plan of Care and Goals  Continue diabetes education  Continue hypertension education   Continue fall prevention      Barriers:  Age  progression of disease process   chronic knee pain  knowledge of healthcare  habits   Difficulty with phone and connecting for monthly outreach follow    Progress:  Progressing     Next outreach:   January 2025    Chart reviewed for Quarterly Assessment, patient continues to qualify and would benefit from PCM program.

## 2025-01-15 ENCOUNTER — TELEPHONE (OUTPATIENT)
Dept: MEDICAL GROUP | Facility: MEDICAL CENTER | Age: 80
End: 2025-01-15
Payer: MEDICARE

## 2025-01-15 DIAGNOSIS — E11.9 TYPE 2 DIABETES MELLITUS WITHOUT COMPLICATION, WITHOUT LONG-TERM CURRENT USE OF INSULIN (HCC): ICD-10-CM

## 2025-01-16 NOTE — TELEPHONE ENCOUNTER
[facility]    Patient with unmet Star goals:  uACR due - orders placed today    Called patient to discuss and address goals. No answer. LVM.    1st attempt    Jose Louis, BrendonD

## 2025-02-04 ENCOUNTER — PATIENT MESSAGE (OUTPATIENT)
Dept: HEALTH INFORMATION MANAGEMENT | Facility: OTHER | Age: 80
End: 2025-02-04

## 2025-02-04 ENCOUNTER — PATIENT OUTREACH (OUTPATIENT)
Dept: HEALTH INFORMATION MANAGEMENT | Facility: OTHER | Age: 80
End: 2025-02-04
Payer: MEDICARE

## 2025-02-04 DIAGNOSIS — Z17.0 MALIGNANT NEOPLASM OF OVERLAPPING SITES OF LEFT BREAST IN FEMALE, ESTROGEN RECEPTOR POSITIVE (HCC): ICD-10-CM

## 2025-02-04 DIAGNOSIS — I10 PRIMARY HYPERTENSION: ICD-10-CM

## 2025-02-04 DIAGNOSIS — E78.5 DYSLIPIDEMIA: ICD-10-CM

## 2025-02-04 DIAGNOSIS — C50.812 MALIGNANT NEOPLASM OF OVERLAPPING SITES OF LEFT BREAST IN FEMALE, ESTROGEN RECEPTOR POSITIVE (HCC): ICD-10-CM

## 2025-02-04 DIAGNOSIS — M25.561 CHRONIC PAIN OF RIGHT KNEE: ICD-10-CM

## 2025-02-04 DIAGNOSIS — E11.9 TYPE 2 DIABETES MELLITUS WITHOUT COMPLICATION, WITHOUT LONG-TERM CURRENT USE OF INSULIN (HCC): ICD-10-CM

## 2025-02-04 DIAGNOSIS — G89.29 CHRONIC PAIN OF RIGHT KNEE: ICD-10-CM

## 2025-02-06 NOTE — PROGRESS NOTES
Reason for Follow-Up:  Monthly outreach follow up.     Current Health Status:  DM2, HTN, dyslipidemia, chronic right knee pain     Medical Updates:    No recent ER visits or hospitalizations    Medication Updates:    No medication changes     Symptoms:    Reports increased right knee pain    Plan of Care Goals and Progress:    Goal 1. Diabetes education     Progress:  Not progressing       Barriers:       Interventions:  Reviewed next PCP follow up     Education:  Will continue to offer Diabetes education at next monthly outreach     Goal 2. Hypertension education      Progress:  not progressing       Barriers:  Age, progression of disease process, chronic knee pain, knowledge of healthcare, habits, difficulty with phone and connecting for monthly outreach follow     Interventions:  Reviewed next PCP follow up     Education:  Will continue to offer hypertension education at next monthly outreach       Patient's Concerns and Feedback (Self Management of Care):   Kaylee for monthly outreach follow-up.  She reports that her right knee pain has become difficult to tolerate.  She has been avoiding surgery for her knee but has decided to follow-up with orthopedics as she may no longer be able to tolerate her pain.  Encouraged Kaylee to bring all of her questions to her orthopedics appointment.  She states otherwise she is doing good.  Her last A1c of 5.9 on 10/17.  Encouraged her progress and attempted to discuss diabetic diet.  Kaylee reviewed her next PCP appointment on 4/21. She reported the fluid pocket/lump in her breast has resolved. Her focus turned to discussing her previous breast cancer treatment and her frustration with her Oncologist. Offered empathy for her difficult path through cancer treatment and the offered number to customer service. She declined. Reviewed her next PCP follow up appointment and encouraged to openly discussed any health concerns with her provider. Kaylee declined further discussion.  She denied any needs at this time. Encouraged to call with any questions or concerns.     Next Steps:     Follow-Up Plan:  March 2025      Appointments:  4/21 PCP     Contact Information:  Call [phone] with any questions or concerns.

## 2025-02-11 ENCOUNTER — TELEPHONE (OUTPATIENT)
Dept: HEALTH INFORMATION MANAGEMENT | Facility: OTHER | Age: 80
End: 2025-02-11
Payer: MEDICARE

## 2025-02-12 NOTE — TELEPHONE ENCOUNTER
Pt called for clarification on some her pending labs and pending imaging. Discussed with pt she has two pending lab orders and one pending dx mammogram. Pt has not yet scheduled f/u with surgical oncologist Adarsh, also alerted pt that she will need a f/u with her from her last visit after her dx mammogram in March. Pt verbalized understanding.   Pt asked if this RN will update [facility] RN Rossana Chang of our conversation, which I have done.   Answered all questions, pt verbalized understanding. No further needs at this time.

## 2025-02-28 ENCOUNTER — HOSPITAL ENCOUNTER (OUTPATIENT)
Dept: RADIOLOGY | Facility: MEDICAL CENTER | Age: 80
End: 2025-02-28
Attending: SURGERY
Payer: MEDICARE

## 2025-02-28 ENCOUNTER — RESULTS FOLLOW-UP (OUTPATIENT)
Dept: SURGERY | Facility: MEDICAL CENTER | Age: 80
End: 2025-02-28

## 2025-02-28 DIAGNOSIS — C50.812 MALIGNANT NEOPLASM OF OVERLAPPING SITES OF LEFT BREAST IN FEMALE, ESTROGEN RECEPTOR POSITIVE (HCC): ICD-10-CM

## 2025-02-28 DIAGNOSIS — Z17.0 MALIGNANT NEOPLASM OF OVERLAPPING SITES OF LEFT BREAST IN FEMALE, ESTROGEN RECEPTOR POSITIVE (HCC): ICD-10-CM

## 2025-02-28 PROCEDURE — G0279 TOMOSYNTHESIS, MAMMO: HCPCS

## 2025-03-03 ENCOUNTER — APPOINTMENT (OUTPATIENT)
Dept: SURGERY | Facility: MEDICAL CENTER | Age: 80
End: 2025-03-03
Payer: MEDICARE

## 2025-03-03 VITALS
WEIGHT: 154 LBS | BODY MASS INDEX: 25.66 KG/M2 | DIASTOLIC BLOOD PRESSURE: 79 MMHG | OXYGEN SATURATION: 96 % | HEART RATE: 81 BPM | HEIGHT: 65 IN | TEMPERATURE: 96.2 F | SYSTOLIC BLOOD PRESSURE: 144 MMHG

## 2025-03-03 DIAGNOSIS — C50.812 MALIGNANT NEOPLASM OF OVERLAPPING SITES OF LEFT BREAST IN FEMALE, ESTROGEN RECEPTOR POSITIVE (HCC): ICD-10-CM

## 2025-03-03 DIAGNOSIS — Z17.0 MALIGNANT NEOPLASM OF OVERLAPPING SITES OF LEFT BREAST IN FEMALE, ESTROGEN RECEPTOR POSITIVE (HCC): ICD-10-CM

## 2025-03-03 PROCEDURE — 1170F FXNL STATUS ASSESSED: CPT

## 2025-03-03 PROCEDURE — 3078F DIAST BP <80 MM HG: CPT

## 2025-03-03 PROCEDURE — 99213 OFFICE O/P EST LOW 20 MIN: CPT

## 2025-03-03 PROCEDURE — 3077F SYST BP >= 140 MM HG: CPT

## 2025-03-03 ASSESSMENT — ENCOUNTER SYMPTOMS
NEUROLOGICAL NEGATIVE: 1
CONSTITUTIONAL NEGATIVE: 1
EYES NEGATIVE: 1
CARDIOVASCULAR NEGATIVE: 1
GASTROINTESTINAL NEGATIVE: 1
RESPIRATORY NEGATIVE: 1
PSYCHIATRIC NEGATIVE: 1

## 2025-03-03 ASSESSMENT — FIBROSIS 4 INDEX: FIB4 SCORE: 1.955749262126034309

## 2025-03-03 NOTE — PROGRESS NOTES
"         SUBJECTIVE:   Kaylee Nazario is a delightful 79 y.o. female who presents for routine follow up visit. She is status post Left partial mastectomy on 04/19/2024 for IDC. Currently, she reports no acute breast concerns, other than some occasional tenderness when she lies on her side. There are no changes in her functional status and she is overall doing well.      Interval Imaging:  - Bilateral Diagnostic Mammogram ([facility]) 02/28/2025:   Left breast UOQ post surgical and radiation changes, Bilateral benign appearing calcifications, No suspicious findings. BIRADS 2. Density C.  Review of Systems   Constitutional: Negative.    HENT:  Positive for tinnitus.    Eyes: Negative.    Respiratory: Negative.     Cardiovascular: Negative.    Gastrointestinal: Negative.    Genitourinary:  Positive for frequency.   Musculoskeletal:  Positive for joint pain.   Skin: Negative.    Neurological: Negative.    Endo/Heme/Allergies: Negative.    Psychiatric/Behavioral: Negative.          OBJECTIVE:  BP (!) 144/79 (BP Location: Right arm, Patient Position: Sitting, BP Cuff Size: Large adult)   Pulse 81   Temp (!) 35.7 °C (96.2 °F) (Temporal)   Ht 1.651 m (5' 5\")   Wt 69.9 kg (154 lb)   SpO2 96%   BMI 25.63 kg/m²    General: Alert, oriented, pleasant, no acute distress.  HEENT: Extraocular movements intact, no scleral icterus or conjunctival injection.  Lung: Respirations unlabored on room air. Lung sounds clear in all fields.  Cardio: Normal rate and rhythm. Heart sounds normal.  Abdomen: Soft, nondistended.  Extremities: Warm, well-perfused.  Breasts: Bilateral breasts examined in the upright and supine positions. No suspicious skin findings on either side (erythema, peau d'orange).  Well-healed left lateral radial incision. Bilateral breasts are heterogeneously dense without dominant masses or nodules. Bilateral nipples everted without expressible discharge. No unexpected contour abnormalities.  No palpable cervical, " supraclavicular, or axillary adenopathy.     FUNCTIONAL ASSESSMENT    Patient responses to questions:    Have you ever had shoulder surgery or been treated for a shoulder injury that resulted in a loss of range of motion?  No     Are you unable to reach into an upper cabinet and retrieve a cup or plate?  No     Do you have any limitations in daily activities because of your shoulder?  No     Overhead raise test for shoulder flexion:  Functional minimum:  125-149 degrees    [facility] [place] ECOG Performance Status categories: 0= Fully active, able to carry on all pre-disease performance without restriction.    ASSESSMENT AND PLAN:  Delightful 79 y.o. female, here for follow-up.   Currently she is doing well, without evidence of malignancy on my exam. We reviewed her imaging in detail. We have discussed the importance of self breast examination and to monitor for changes, such as breast pain, bloody nipple discharge, skin changes, masses and countour/nipple changes, as things can change between imaging intervals. If these are to occur, I advised her to notify our office immediately. All questions answered in detail.    Referrals:None  Return to office: 09/2025  Next Clinical Breast Exam: 09/2025, 03/2026, 09/2026  Next Imaging: Bilateral Screening Mammogram 03/2026    Problem   Malignant Neoplasm of Overlapping Sites of Left Breast in Female, Estrogen Receptor Positive (Hcc)    Left breast 03:00 9cmFN IDC, G2, ER+NV+HER2-, Ki67 <10%, pT1c cN0M0 (justa/prog IA)  - US-guided CNBx 03/19/2024  - Left partial mastectomy, omission of axillary sampling due to age/favorable tumor 04/19/2024 ([facility])  - Accelerated partial breast irradiation ([facility])  - Stopped anastrozole due to muscle pain ([facility]).           Cancer Staging   Malignant neoplasm of overlapping sites of left breast in female, estrogen receptor positive (HCC)  Staging form: Breast, AJCC 8th Edition  - Clinical stage from 3/21/2024: cT1b, cN0, cM0, G2, ER+, NV+,  HER2: Unknown - Signed by Meme Altamirano M.D. on 3/21/2024  - Pathologic stage from 4/30/2024: pT1c, cN0, cM0, G2, ER+, GA+, HER2- - Signed by Meme Altamirano M.D. on 4/30/2024       External imaging and reports were independently reviewed.  I spent 20 minutes today preparing to see the patient (including chart preparation and review), obtaining and reviewing additional medical history, performing a physical exam and evaluation, documenting clinical information in the electronic health record, independently interpreting results, communicating results to the patient, and coordinating care.     Rebecca Fallon PA-C

## 2025-03-05 ENCOUNTER — PATIENT MESSAGE (OUTPATIENT)
Dept: HEALTH INFORMATION MANAGEMENT | Facility: OTHER | Age: 80
End: 2025-03-05

## 2025-03-05 ENCOUNTER — PATIENT OUTREACH (OUTPATIENT)
Dept: HEALTH INFORMATION MANAGEMENT | Facility: OTHER | Age: 80
End: 2025-03-05
Payer: MEDICARE

## 2025-03-05 DIAGNOSIS — C50.812 MALIGNANT NEOPLASM OF OVERLAPPING SITES OF LEFT BREAST IN FEMALE, ESTROGEN RECEPTOR POSITIVE (HCC): ICD-10-CM

## 2025-03-05 DIAGNOSIS — M25.561 CHRONIC PAIN OF RIGHT KNEE: ICD-10-CM

## 2025-03-05 DIAGNOSIS — G89.29 CHRONIC PAIN OF RIGHT KNEE: ICD-10-CM

## 2025-03-05 DIAGNOSIS — E78.5 DYSLIPIDEMIA: ICD-10-CM

## 2025-03-05 DIAGNOSIS — E11.9 TYPE 2 DIABETES MELLITUS WITHOUT COMPLICATION, WITHOUT LONG-TERM CURRENT USE OF INSULIN (HCC): ICD-10-CM

## 2025-03-05 DIAGNOSIS — Z17.0 MALIGNANT NEOPLASM OF OVERLAPPING SITES OF LEFT BREAST IN FEMALE, ESTROGEN RECEPTOR POSITIVE (HCC): ICD-10-CM

## 2025-03-05 DIAGNOSIS — I10 PRIMARY HYPERTENSION: ICD-10-CM

## 2025-03-08 NOTE — PROGRESS NOTES
Reason for Follow-Up:  Monthly outreach follow up.     Current Health Status:  DM2, HTN, dyslipidemia, chronic right knee pain     Medical Updates:    No recent ER visits or hospitalizations    Medication Updates:    No medication changes     Symptoms:    Reports increased right knee pain    Plan of Care Goals and Progress:    Goal 1.      Progress:  Kaylee's main focus is on her knee pain and follow up with [facility] orthopedics.       Barriers:  Age, progression of disease process, chronic knee pain, knowledge of healthcare, habits, difficulty with phone and connecting for monthly outreach follow     Interventions:  Reviewed pending lab orders and next PCP follow up on 4/21     Education:  Will continue to offer  encouraged to call with any questions or concerns education at next monthly outreach     Goal 2. Hypertension education      Progress:  Kaylee's main focus is on her knee pain and follow up with [facility] orthopedics.       Barriers:  Age, progression of disease process, chronic knee pain, knowledge of healthcare, habits, difficulty with phone and connecting for monthly outreach follow     Interventions:  Reviewed pending lab orders and next PCP follow up on 4/21     Education:  Will continue to offer hypertension education at next monthly outreach       Patient's Concerns and Feedback (Self Management of Care):   Spoke with Kaylee for monthly outreach follow-up.  Kaylee reports that the pain to her right knee has become unbearable.  She presented to [facility] orthopedics for x-rays and has an evaluation scheduled for next Wednesday.  Kaylee believes she is likely in need of surgery.  She had difficulties with her left knee replacement and has many stressors and reservations about surgery to her right knee.  Allowed Kaylee to verbalize her feelings.  Discussed total knee replacement surgery at length.  Reviewed the importance of physical therapy after surgery and what to expect with recovery.   Reviewed pending lab orders and next PCP appointment scheduled.  Her main focus has been her increased knee pain.  No discussion provided on diabetes or hypertension education will continue to offer at next months outreach.  Kaylee denies any needs at this time, encouraged to call with any questions or concerns.     Next Steps:     Follow-Up Plan:  April 2025      Appointments:  4/14 Lab, 4/21 PCP      Contact Information:  Call [phone] with any questions or concerns.

## 2025-03-11 DIAGNOSIS — K59.00 CONSTIPATION, UNSPECIFIED CONSTIPATION TYPE: ICD-10-CM

## 2025-03-11 RX ORDER — LINACLOTIDE 145 UG/1
CAPSULE, GELATIN COATED ORAL
Qty: 100 CAPSULE | Refills: 3 | Status: SHIPPED | OUTPATIENT
Start: 2025-03-11

## 2025-03-11 NOTE — TELEPHONE ENCOUNTER
Received request via: Pharmacy    Was the patient seen in the last year in this department? Yes    Does the patient have an active prescription (recently filled or refills available) for medication(s) requested? No    Pharmacy Name:    [facility] - [address]          Does the patient have detention Plus and need 100-day supply? (This applies to ALL medications) Yes, quantity updated to 100 days

## 2025-03-20 ENCOUNTER — PATIENT MESSAGE (OUTPATIENT)
Dept: HEALTH INFORMATION MANAGEMENT | Facility: OTHER | Age: 80
End: 2025-03-20

## 2025-03-24 NOTE — PROGRESS NOTES
Spoke with patient on the phone regards to her paperwork from us (2 regarding her implant total knee replacement    RCRI 6.79  Denies chest pain shortness of breath, dyspnea on exertion, patient is able to take care of self and do housework.  She reportedly is able to walk out to her mailbox without any issues or climb a flight of stairs without issue if she has routines.  RCRI 0, 3.9% risk of major cardiac event    Recommend patient holding off on aspirin 7 days prior to surgery or speak with the surgeon  She is not currently taking any herbal supplements    See patient on 4/21/25 if there is any reason to cancel surgery and will let both the patient and surgeon know

## 2025-04-04 ENCOUNTER — APPOINTMENT (OUTPATIENT)
Dept: ADMISSIONS | Facility: MEDICAL CENTER | Age: 80
End: 2025-04-04
Attending: ORTHOPAEDIC SURGERY
Payer: MEDICARE

## 2025-04-09 ENCOUNTER — PATIENT OUTREACH (OUTPATIENT)
Dept: HEALTH INFORMATION MANAGEMENT | Facility: OTHER | Age: 80
End: 2025-04-09
Payer: MEDICARE

## 2025-04-09 DIAGNOSIS — M25.561 CHRONIC PAIN OF RIGHT KNEE: ICD-10-CM

## 2025-04-09 DIAGNOSIS — I10 PRIMARY HYPERTENSION: ICD-10-CM

## 2025-04-09 DIAGNOSIS — E11.9 TYPE 2 DIABETES MELLITUS WITHOUT COMPLICATION, WITHOUT LONG-TERM CURRENT USE OF INSULIN (HCC): ICD-10-CM

## 2025-04-09 DIAGNOSIS — E78.5 DYSLIPIDEMIA: ICD-10-CM

## 2025-04-09 DIAGNOSIS — G89.29 CHRONIC PAIN OF RIGHT KNEE: ICD-10-CM

## 2025-04-10 ENCOUNTER — PRE-ADMISSION TESTING (OUTPATIENT)
Dept: ADMISSIONS | Facility: MEDICAL CENTER | Age: 80
End: 2025-04-10
Attending: ORTHOPAEDIC SURGERY
Payer: MEDICARE

## 2025-04-10 VITALS — HEIGHT: 65 IN | BODY MASS INDEX: 25.63 KG/M2

## 2025-04-10 DIAGNOSIS — Z01.810 PRE-OPERATIVE CARDIOVASCULAR EXAMINATION: ICD-10-CM

## 2025-04-10 DIAGNOSIS — Z01.812 PRE-OPERATIVE LABORATORY EXAMINATION: ICD-10-CM

## 2025-04-10 RX ORDER — DOCUSATE SODIUM 100 MG/1
100 CAPSULE, LIQUID FILLED ORAL 2 TIMES DAILY
COMMUNITY

## 2025-04-10 NOTE — PREADMIT AVS NOTE
Current Medications   Medication Instructions    Cholecalciferol (VITAMIN D-3) 125 MCG (5000 UT) Tab Stop 7 days before surgery    docusate sodium (COLACE) 100 MG Cap Continue as usual    linaCLOtide (LINZESS) 145 MCG Cap Follow instructions from surgeon or specialist.    levothyroxine (SYNTHROID) 100 MCG Tab Continue taking medication as prescribed, including morning of procedure     atorvastatin (LIPITOR) 20 MG Tab Continue as usual    aspirin (ASA) 81 MG Chew Tab chewable tablet Follow instructions from surgeon or specialist.    metFORMIN ER (GLUCOPHAGE XR) 500 MG TABLET SR 24 HR Hold medication day of procedure    amLODIPine (NORVASC) 10 MG Tab Continue taking medication as prescribed, including morning of procedure     furosemide (LASIX) 20 MG Tab Hold medication day of procedure    lidocaine (XYLOCAINE) 2 % Solution Follow instructions from surgeon or specialist.    timolol (TIMOPTIC) 0.5 % Solution Follow instructions from surgeon or specialist.    latanoprost (XALATAN) 0.005 % Solution Follow instructions from surgeon or specialist.    MAGNESIUM PO Stop 7 days before surgery

## 2025-04-10 NOTE — PROGRESS NOTES
Reason for Follow-Up:  Monthly outreach follow up.     Current Health Status:  DM2, HTN, dyslipidemia, chronic right knee pain     Medical Updates:    No recent ER visits or hospitalizations    Medication Updates:    No medication changes     Symptoms:    Reports increased right knee pain    Plan of Care Goals and Progress:    Goal 1. Diabetes Education      Progress:  Last A1c was 5.9 on 10/17. Kaylee's main focus today is her upcoming knee surgery.       Barriers:  Age, progression of disease process, chronic knee pain, knowledge of healthcare, habits     Interventions:  Reviewed pending labs with a lab appointment on 4/14 and next PCP appointment on 4/21.     Education: Will continue diabetes education at next monthly outreach follow-up.  Kaylee's main focus today is her upcoming knee surgery.  She reports she is scheduled for May 2    Goal 2. Hypertension education      Progress:  Kaylee's main focus today is her upcoming knee surgery.       Barriers:  Age, progression of disease process, chronic knee pain, knowledge of healthcare, habits     Interventions:  Reviewed pending labs with a lab appointment on 4/14 and next PCP appointment on 4/21.     Education:  Will continue hypertension education at next monthly outreach follow-up.  Kaylee's main focus today is her upcoming knee surgery. She reports she is scheduled for May 2.     Patient's Concerns and Feedback (Self Management of Care):   Spoke with Kaylee for monthly outreach follow-up.  Kaylee reports she is scheduled for an right knee replacement on May 2.  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  This is very important to her and she is hoping that she will be able to continue to play the organ through the rest of the month.  Reviewed preadmit schedule and discussed physical therapy and recovery of knee surgery as well.  Kaylee's main focus today is her upcoming knee surgery.  Will continue  diabetes and hypertension education at next monthly outreach follow-up.  Reviewed her pending lab orders and upcoming PCP appointment.  She denies any needs at this time encouraged to call with any questions or concerns. Chart reviewed for Quarterly Assessment, patient continues to qualify and would benefit from PCM program.     Next Steps:     Follow-Up Plan:  May 2025      Appointments:  4/14 lab, 4/21 PCP     Contact Information:  Call [phone] with any questions or concerns.

## 2025-04-14 ENCOUNTER — HOSPITAL ENCOUNTER (OUTPATIENT)
Dept: LAB | Facility: MEDICAL CENTER | Age: 80
End: 2025-04-14
Attending: ORTHOPAEDIC SURGERY
Payer: MEDICARE

## 2025-04-14 ENCOUNTER — HOSPITAL ENCOUNTER (OUTPATIENT)
Dept: LAB | Facility: MEDICAL CENTER | Age: 80
End: 2025-04-14
Attending: STUDENT IN AN ORGANIZED HEALTH CARE EDUCATION/TRAINING PROGRAM
Payer: MEDICARE

## 2025-04-14 DIAGNOSIS — E03.9 HYPOTHYROIDISM, UNSPECIFIED TYPE: ICD-10-CM

## 2025-04-14 DIAGNOSIS — E11.9 TYPE 2 DIABETES MELLITUS WITHOUT COMPLICATION, WITHOUT LONG-TERM CURRENT USE OF INSULIN (HCC): ICD-10-CM

## 2025-04-14 LAB
ALBUMIN SERPL BCP-MCNC: 4.1 G/DL (ref 3.2–4.9)
ALBUMIN/GLOB SERPL: 1.2 G/DL
ALP SERPL-CCNC: 115 U/L (ref 30–99)
ALT SERPL-CCNC: 13 U/L (ref 2–50)
ANION GAP SERPL CALC-SCNC: 13 MMOL/L (ref 7–16)
AST SERPL-CCNC: 20 U/L (ref 12–45)
BILIRUB SERPL-MCNC: 0.4 MG/DL (ref 0.1–1.5)
BUN SERPL-MCNC: 9 MG/DL (ref 8–22)
CALCIUM ALBUM COR SERPL-MCNC: 9.6 MG/DL (ref 8.5–10.5)
CALCIUM SERPL-MCNC: 9.7 MG/DL (ref 8.5–10.5)
CHLORIDE SERPL-SCNC: 108 MMOL/L (ref 96–112)
CO2 SERPL-SCNC: 24 MMOL/L (ref 20–33)
CREAT SERPL-MCNC: 0.66 MG/DL (ref 0.5–1.4)
CREAT UR-MCNC: 132 MG/DL
GFR SERPLBLD CREATININE-BSD FMLA CKD-EPI: 88 ML/MIN/1.73 M 2
GLOBULIN SER CALC-MCNC: 3.4 G/DL (ref 1.9–3.5)
GLUCOSE SERPL-MCNC: 107 MG/DL (ref 65–99)
MICROALBUMIN UR-MCNC: 2.5 MG/DL
MICROALBUMIN/CREAT UR: 19 MG/G (ref 0–30)
POTASSIUM SERPL-SCNC: 4.3 MMOL/L (ref 3.6–5.5)
PROT SERPL-MCNC: 7.5 G/DL (ref 6–8.2)
SODIUM SERPL-SCNC: 145 MMOL/L (ref 135–145)
TSH SERPL DL<=0.005 MIU/L-ACNC: 0.85 UIU/ML (ref 0.38–5.33)

## 2025-04-14 PROCEDURE — 80053 COMPREHEN METABOLIC PANEL: CPT

## 2025-04-14 PROCEDURE — 82043 UR ALBUMIN QUANTITATIVE: CPT

## 2025-04-14 PROCEDURE — 82570 ASSAY OF URINE CREATININE: CPT

## 2025-04-14 PROCEDURE — 36415 COLL VENOUS BLD VENIPUNCTURE: CPT

## 2025-04-14 PROCEDURE — 84443 ASSAY THYROID STIM HORMONE: CPT

## 2025-04-15 ENCOUNTER — RESULTS FOLLOW-UP (OUTPATIENT)
Dept: MEDICAL GROUP | Facility: MEDICAL CENTER | Age: 80
End: 2025-04-15

## 2025-04-16 ENCOUNTER — APPOINTMENT (OUTPATIENT)
Dept: ADMISSIONS | Facility: MEDICAL CENTER | Age: 80
End: 2025-04-16
Attending: ORTHOPAEDIC SURGERY
Payer: MEDICARE

## 2025-04-16 VITALS — HEIGHT: 65 IN | BODY MASS INDEX: 25.71 KG/M2 | WEIGHT: 154.32 LBS

## 2025-04-16 DIAGNOSIS — Z01.812 PRE-OPERATIVE LABORATORY EXAMINATION: ICD-10-CM

## 2025-04-16 DIAGNOSIS — Z01.810 PRE-OPERATIVE CARDIOVASCULAR EXAMINATION: ICD-10-CM

## 2025-04-16 LAB
EKG IMPRESSION: NORMAL
ERYTHROCYTE [DISTWIDTH] IN BLOOD BY AUTOMATED COUNT: 44.7 FL (ref 35.9–50)
HCT VFR BLD AUTO: 40.3 % (ref 37–47)
HGB BLD-MCNC: 13.1 G/DL (ref 12–16)
MCH RBC QN AUTO: 30.3 PG (ref 27–33)
MCHC RBC AUTO-ENTMCNC: 32.5 G/DL (ref 32.2–35.5)
MCV RBC AUTO: 93.3 FL (ref 81.4–97.8)
PLATELET # BLD AUTO: 332 K/UL (ref 164–446)
PMV BLD AUTO: 11.2 FL (ref 9–12.9)
RBC # BLD AUTO: 4.32 M/UL (ref 4.2–5.4)
SCCMEC + MECA PNL NOSE NAA+PROBE: NEGATIVE
SCCMEC + MECA PNL NOSE NAA+PROBE: NEGATIVE
WBC # BLD AUTO: 8.2 K/UL (ref 4.8–10.8)

## 2025-04-16 PROCEDURE — 87641 MR-STAPH DNA AMP PROBE: CPT

## 2025-04-16 PROCEDURE — 83036 HEMOGLOBIN GLYCOSYLATED A1C: CPT

## 2025-04-16 PROCEDURE — 85027 COMPLETE CBC AUTOMATED: CPT

## 2025-04-16 PROCEDURE — 93005 ELECTROCARDIOGRAM TRACING: CPT | Mod: TC

## 2025-04-16 PROCEDURE — 93010 ELECTROCARDIOGRAM REPORT: CPT | Performed by: INTERNAL MEDICINE

## 2025-04-16 PROCEDURE — 36415 COLL VENOUS BLD VENIPUNCTURE: CPT

## 2025-04-16 PROCEDURE — 87640 STAPH A DNA AMP PROBE: CPT | Mod: XU

## 2025-04-16 ASSESSMENT — FIBROSIS 4 INDEX: FIB4 SCORE: 2

## 2025-04-16 NOTE — DISCHARGE PLANNING
DISCHARGE PLANNING NOTE - TOTAL JOINT    Procedure:   Procedure Date: * No surgery found *  Insurance: Payor: [facility] SENIOR CARE PLUS / Plan: [facility] SCP [facility] PREFERRED  / Product Type: Medicare Advantage /    Equipment currently available at home?  cane and shower chair  Steps into the home? 1  Steps within the home? 0  Toilet height? Standard  Type of shower? tub-shower  Who will be with you during your recovery? spouse  Is Outpatient Physical Therapy set up after surgery? No   Did you take the Total Joint Class and where? No   Planning same day discharge?Yes     Met with pt and spouse during their preadmission appointment. Pt states, has all needed equipment except Front-Wheel Walker. Home safety checklist reviewed and copy given to pt. Pt instructed on [employer] Scrub showers and kit given to patient with handouts. All questions answered and verbalizes understanding of all instructions. No dc needs identified at this time. Anticipate dc to home without barriers.

## 2025-04-17 LAB
EST. AVERAGE GLUCOSE BLD GHB EST-MCNC: 151 MG/DL
HBA1C MFR BLD: 6.9 % (ref 4–5.6)

## 2025-04-21 ENCOUNTER — OFFICE VISIT (OUTPATIENT)
Dept: MEDICAL GROUP | Facility: MEDICAL CENTER | Age: 80
End: 2025-04-21
Payer: MEDICARE

## 2025-04-21 VITALS
BODY MASS INDEX: 25.46 KG/M2 | RESPIRATION RATE: 18 BRPM | SYSTOLIC BLOOD PRESSURE: 112 MMHG | OXYGEN SATURATION: 96 % | WEIGHT: 153 LBS | HEART RATE: 87 BPM | TEMPERATURE: 97.8 F | DIASTOLIC BLOOD PRESSURE: 62 MMHG

## 2025-04-21 DIAGNOSIS — M25.561 CHRONIC PAIN OF RIGHT KNEE: ICD-10-CM

## 2025-04-21 DIAGNOSIS — E03.9 HYPOTHYROIDISM, UNSPECIFIED TYPE: ICD-10-CM

## 2025-04-21 DIAGNOSIS — Z17.0 MALIGNANT NEOPLASM OF OVERLAPPING SITES OF LEFT BREAST IN FEMALE, ESTROGEN RECEPTOR POSITIVE (HCC): ICD-10-CM

## 2025-04-21 DIAGNOSIS — E78.5 DYSLIPIDEMIA: ICD-10-CM

## 2025-04-21 DIAGNOSIS — E11.65 TYPE 2 DIABETES MELLITUS WITH HYPERGLYCEMIA, WITHOUT LONG-TERM CURRENT USE OF INSULIN (HCC): ICD-10-CM

## 2025-04-21 DIAGNOSIS — K59.00 CONSTIPATION, UNSPECIFIED CONSTIPATION TYPE: ICD-10-CM

## 2025-04-21 DIAGNOSIS — G89.29 CHRONIC PAIN OF RIGHT KNEE: ICD-10-CM

## 2025-04-21 DIAGNOSIS — C50.812 MALIGNANT NEOPLASM OF OVERLAPPING SITES OF LEFT BREAST IN FEMALE, ESTROGEN RECEPTOR POSITIVE (HCC): ICD-10-CM

## 2025-04-21 PROBLEM — Z79.811 AROMATASE INHIBITOR USE: Status: RESOLVED | Noted: 2024-06-11 | Resolved: 2025-04-21

## 2025-04-21 PROCEDURE — 3074F SYST BP LT 130 MM HG: CPT | Performed by: STUDENT IN AN ORGANIZED HEALTH CARE EDUCATION/TRAINING PROGRAM

## 2025-04-21 PROCEDURE — 3078F DIAST BP <80 MM HG: CPT | Performed by: STUDENT IN AN ORGANIZED HEALTH CARE EDUCATION/TRAINING PROGRAM

## 2025-04-21 PROCEDURE — 99214 OFFICE O/P EST MOD 30 MIN: CPT | Performed by: STUDENT IN AN ORGANIZED HEALTH CARE EDUCATION/TRAINING PROGRAM

## 2025-04-21 PROCEDURE — 1170F FXNL STATUS ASSESSED: CPT | Performed by: STUDENT IN AN ORGANIZED HEALTH CARE EDUCATION/TRAINING PROGRAM

## 2025-04-21 RX ORDER — LINACLOTIDE 145 UG/1
CAPSULE, GELATIN COATED ORAL
Qty: 100 CAPSULE | Refills: 3 | Status: SHIPPED | OUTPATIENT
Start: 2025-04-21

## 2025-04-21 ASSESSMENT — ENCOUNTER SYMPTOMS
WHEEZING: 0
HEADACHES: 0
CHILLS: 0
SHORTNESS OF BREATH: 0
WEIGHT LOSS: 0
FEVER: 0
VOMITING: 0
PALPITATIONS: 0
DIZZINESS: 0
NAUSEA: 0

## 2025-04-21 ASSESSMENT — FIBROSIS 4 INDEX: FIB4 SCORE: 1.34

## 2025-04-21 ASSESSMENT — PATIENT HEALTH QUESTIONNAIRE - PHQ9: CLINICAL INTERPRETATION OF PHQ2 SCORE: 0

## 2025-04-21 NOTE — PROGRESS NOTES
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  She reports she had preop lab and EKG done.  RCRI 3.9%.  METS 5-6 in the setting of severe right knee arthralgia/arthritis.  She reported history of intermittent constipation with requirement of Linzess as needed.  She reports she was told to take docusate twice a day.  She does use MiraLAX as needed.  She inquired about intermittent show regarding a supplement for memory.      She continues to follow with breast surgeon most recently had Diagnostic mammogram without evidence of malignancy.  Recommend annual follow-up mammography and self breast exam        Health Maintenance:     ROS:  Review of Systems   Constitutional:  Negative for chills, fever and weight loss.   HENT:  Negative for hearing loss.    Respiratory:  Negative for shortness of breath and wheezing.    Cardiovascular:  Negative for chest pain and palpitations.   Gastrointestinal:  Negative for nausea and vomiting.   Genitourinary:  Negative for frequency and urgency.   Skin:  Negative for rash.   Neurological:  Negative for dizziness and headaches.       Objective:     Exam:  /62 (BP Location: Right arm)   Pulse 87   Temp 36.6 °C (97.8 °F) (Temporal)   Resp 18   Wt 69.4 kg (153 lb)   SpO2 96%   BMI 25.46 kg/m²  Body mass index is 25.46 kg/m².    Physical Exam  Constitutional:       Appearance: Normal appearance.   Cardiovascular:      Rate and Rhythm: Normal rate and regular rhythm.      Heart sounds: No murmur heard.  Pulmonary:      Effort: Pulmonary  effort is normal.      Breath sounds: Normal breath sounds. No wheezing.   Musculoskeletal:      Cervical back: Normal range of motion and neck supple.   Neurological:      Mental Status: She is alert.           Labs: A1c 6.9, CBC is fine, CMP with normal renal function no significant    Assessment & Plan:     79 y.o. female with the following -     1. Constipation, unspecified constipation type  Chronic, stable  - linaCLOtide (LINZESS) 145 MCG Cap; TAKE 1 CAPSULE BY MOUTH ONCE DAILY AS NEEDED  Dispense: 100 Capsule; Refill: 3    2. Malignant neoplasm of overlapping sites of left breast in female, estrogen receptor positive (HCC)  Chronic, stable follows with breast surgeon last mammogram without evidence of malignancy.  Patient report continues to follow with breast surgeon every 6 months    3. Hypothyroidism, unspecified type  Chronic, stable last TSH within normal limits    4. Dyslipidemia  History of    5. Chronic pain of right knee  RCRI 3.9%, METS 5-6.  Overall patient is low risk for moderate risk orthopedic surgery    6. Type 2 diabetes mellitus with hyperglycemia, without long-term current use of insulin (HCC)  Chronic, stable  On metformin 500 mg twice a day last A1c 6.9.  Recommend continue lifestyle control and continue adherence to metformin      HCC Gap Form    Diagnosis to address: C50.812, Z17.0 - Malignant neoplasm of overlapping sites of left breast in female, estrogen receptor positive (HCC)  Assessment and plan: Chronic, stable. Continue with current defined treatment plan: follow w surgeon, and gets routine ultrasound and diag karina. Cannot tolerate anastrazol . Follow-up at least annually.  Last edited 04/21/25 14:41 PDT by Felix Timmons M.D.             Return in about 3 months (around 7/21/2025) for Lab review, Med check.    Please note that this dictation was created using voice recognition software. I have made every reasonable attempt to correct obvious errors, but I expect that there  are errors of grammar and possibly content that I did not discover before finalizing the note.

## 2025-04-25 ENCOUNTER — TELEPHONE (OUTPATIENT)
Dept: MEDICAL GROUP | Facility: MEDICAL CENTER | Age: 80
End: 2025-04-25
Payer: MEDICARE

## 2025-04-25 NOTE — TELEPHONE ENCOUNTER
Phone Number Called: [phone]    Call outcome: Spoke to patient regarding message below.    Message: Pt had left a vm which was not clear. I have called pt but she says she had sent a [employer] message to Dr Timmons and he had already responded to her issues.  Yolanda Hernandez, Med Ass't

## 2025-05-01 ENCOUNTER — TELEPHONE (OUTPATIENT)
Dept: HEALTH INFORMATION MANAGEMENT | Facility: OTHER | Age: 80
End: 2025-05-01

## 2025-05-02 ENCOUNTER — ANESTHESIA (OUTPATIENT)
Dept: SURGERY | Facility: MEDICAL CENTER | Age: 80
End: 2025-05-02
Payer: MEDICARE

## 2025-05-02 ENCOUNTER — HOSPITAL ENCOUNTER (OUTPATIENT)
Facility: MEDICAL CENTER | Age: 80
End: 2025-05-02
Attending: ORTHOPAEDIC SURGERY | Admitting: ORTHOPAEDIC SURGERY
Payer: MEDICARE

## 2025-05-02 ENCOUNTER — ANESTHESIA EVENT (OUTPATIENT)
Dept: SURGERY | Facility: MEDICAL CENTER | Age: 80
End: 2025-05-02
Payer: MEDICARE

## 2025-05-02 ENCOUNTER — APPOINTMENT (OUTPATIENT)
Dept: RADIOLOGY | Facility: MEDICAL CENTER | Age: 80
End: 2025-05-02
Payer: MEDICARE

## 2025-05-02 VITALS
WEIGHT: 152.12 LBS | HEART RATE: 77 BPM | TEMPERATURE: 97.2 F | BODY MASS INDEX: 25.34 KG/M2 | OXYGEN SATURATION: 100 % | DIASTOLIC BLOOD PRESSURE: 66 MMHG | SYSTOLIC BLOOD PRESSURE: 133 MMHG | HEIGHT: 65 IN | RESPIRATION RATE: 18 BRPM

## 2025-05-02 LAB — GLUCOSE BLD STRIP.AUTO-MCNC: 99 MG/DL (ref 65–99)

## 2025-05-02 PROCEDURE — 160036 HCHG PACU - EA ADDL 30 MINS PHASE I: Performed by: ORTHOPAEDIC SURGERY

## 2025-05-02 PROCEDURE — 700111 HCHG RX REV CODE 636 W/ 250 OVERRIDE (IP): Performed by: ORTHOPAEDIC SURGERY

## 2025-05-02 PROCEDURE — 700102 HCHG RX REV CODE 250 W/ 637 OVERRIDE(OP): Performed by: ANESTHESIOLOGY

## 2025-05-02 PROCEDURE — 160041 HCHG SURGERY MINUTES - EA ADDL 1 MIN LEVEL 4: Performed by: ORTHOPAEDIC SURGERY

## 2025-05-02 PROCEDURE — C1776 JOINT DEVICE (IMPLANTABLE): HCPCS | Performed by: ORTHOPAEDIC SURGERY

## 2025-05-02 PROCEDURE — C1713 ANCHOR/SCREW BN/BN,TIS/BN: HCPCS | Performed by: ORTHOPAEDIC SURGERY

## 2025-05-02 PROCEDURE — 502000 HCHG MISC OR IMPLANTS RC 0278: Performed by: ORTHOPAEDIC SURGERY

## 2025-05-02 PROCEDURE — 700101 HCHG RX REV CODE 250: Performed by: ORTHOPAEDIC SURGERY

## 2025-05-02 PROCEDURE — 700105 HCHG RX REV CODE 258: Performed by: ORTHOPAEDIC SURGERY

## 2025-05-02 PROCEDURE — 160015 HCHG STAT PREOP MINUTES: Performed by: ORTHOPAEDIC SURGERY

## 2025-05-02 PROCEDURE — 73560 X-RAY EXAM OF KNEE 1 OR 2: CPT | Mod: RT

## 2025-05-02 PROCEDURE — 160025 RECOVERY II MINUTES (STATS): Performed by: ORTHOPAEDIC SURGERY

## 2025-05-02 PROCEDURE — 160046 HCHG PACU - 1ST 60 MINS PHASE II: Performed by: ORTHOPAEDIC SURGERY

## 2025-05-02 PROCEDURE — 700111 HCHG RX REV CODE 636 W/ 250 OVERRIDE (IP): Performed by: ANESTHESIOLOGY

## 2025-05-02 PROCEDURE — 160029 HCHG SURGERY MINUTES - 1ST 30 MINS LEVEL 4: Performed by: ORTHOPAEDIC SURGERY

## 2025-05-02 PROCEDURE — 160002 HCHG RECOVERY MINUTES (STAT): Performed by: ORTHOPAEDIC SURGERY

## 2025-05-02 PROCEDURE — A9270 NON-COVERED ITEM OR SERVICE: HCPCS | Performed by: ANESTHESIOLOGY

## 2025-05-02 PROCEDURE — 700111 HCHG RX REV CODE 636 W/ 250 OVERRIDE (IP): Mod: JZ | Performed by: ANESTHESIOLOGY

## 2025-05-02 PROCEDURE — 97535 SELF CARE MNGMENT TRAINING: CPT

## 2025-05-02 PROCEDURE — 502240 HCHG MISC OR SUPPLY RC 0272: Performed by: ORTHOPAEDIC SURGERY

## 2025-05-02 PROCEDURE — 160035 HCHG PACU - 1ST 60 MINS PHASE I: Performed by: ORTHOPAEDIC SURGERY

## 2025-05-02 PROCEDURE — 82962 GLUCOSE BLOOD TEST: CPT

## 2025-05-02 PROCEDURE — 700101 HCHG RX REV CODE 250: Performed by: ANESTHESIOLOGY

## 2025-05-02 PROCEDURE — 160009 HCHG ANES TIME/MIN: Performed by: ORTHOPAEDIC SURGERY

## 2025-05-02 PROCEDURE — 97162 PT EVAL MOD COMPLEX 30 MIN: CPT

## 2025-05-02 PROCEDURE — 160048 HCHG OR STATISTICAL LEVEL 1-5: Performed by: ORTHOPAEDIC SURGERY

## 2025-05-02 DEVICE — IMPLANTABLE DEVICE: Type: IMPLANTABLE DEVICE | Site: KNEE | Status: FUNCTIONAL

## 2025-05-02 DEVICE — CEMENT ORTHOPEDIC HV US (10/PK): Type: IMPLANTABLE DEVICE | Site: KNEE | Status: FUNCTIONAL

## 2025-05-02 RX ORDER — IPRATROPIUM BROMIDE AND ALBUTEROL SULFATE 2.5; .5 MG/3ML; MG/3ML
3 SOLUTION RESPIRATORY (INHALATION)
Status: DISCONTINUED | OUTPATIENT
Start: 2025-05-02 | End: 2025-05-02 | Stop reason: HOSPADM

## 2025-05-02 RX ORDER — OXYCODONE HCL 5 MG/5 ML
5 SOLUTION, ORAL ORAL
Status: COMPLETED | OUTPATIENT
Start: 2025-05-02 | End: 2025-05-02

## 2025-05-02 RX ORDER — ONDANSETRON 2 MG/ML
INJECTION INTRAMUSCULAR; INTRAVENOUS PRN
Status: DISCONTINUED | OUTPATIENT
Start: 2025-05-02 | End: 2025-05-02 | Stop reason: SURG

## 2025-05-02 RX ORDER — OXYCODONE HYDROCHLORIDE 5 MG/1
2.5 TABLET ORAL
Refills: 0 | Status: CANCELLED | OUTPATIENT
Start: 2025-05-02

## 2025-05-02 RX ORDER — SODIUM PHOSPHATE,MONO-DIBASIC 19G-7G/118
1 ENEMA (ML) RECTAL
Status: CANCELLED | OUTPATIENT
Start: 2025-05-02

## 2025-05-02 RX ORDER — ROPIVACAINE HYDROCHLORIDE 5 MG/ML
INJECTION, SOLUTION EPIDURAL; INFILTRATION; PERINEURAL
Status: DISCONTINUED | OUTPATIENT
Start: 2025-05-02 | End: 2025-05-02 | Stop reason: HOSPADM

## 2025-05-02 RX ORDER — ONDANSETRON 2 MG/ML
4 INJECTION INTRAMUSCULAR; INTRAVENOUS
Status: DISCONTINUED | OUTPATIENT
Start: 2025-05-02 | End: 2025-05-02 | Stop reason: HOSPADM

## 2025-05-02 RX ORDER — HALOPERIDOL 5 MG/ML
1 INJECTION INTRAMUSCULAR EVERY 6 HOURS PRN
Status: CANCELLED | OUTPATIENT
Start: 2025-05-02

## 2025-05-02 RX ORDER — DEXAMETHASONE SODIUM PHOSPHATE 4 MG/ML
INJECTION, SOLUTION INTRA-ARTICULAR; INTRALESIONAL; INTRAMUSCULAR; INTRAVENOUS; SOFT TISSUE PRN
Status: DISCONTINUED | OUTPATIENT
Start: 2025-05-02 | End: 2025-05-02 | Stop reason: SURG

## 2025-05-02 RX ORDER — HYDROMORPHONE HYDROCHLORIDE 1 MG/ML
0.2 INJECTION, SOLUTION INTRAMUSCULAR; INTRAVENOUS; SUBCUTANEOUS
Status: DISCONTINUED | OUTPATIENT
Start: 2025-05-02 | End: 2025-05-02 | Stop reason: HOSPADM

## 2025-05-02 RX ORDER — AMOXICILLIN 250 MG
1 CAPSULE ORAL
Status: CANCELLED | OUTPATIENT
Start: 2025-05-02

## 2025-05-02 RX ORDER — ONDANSETRON 2 MG/ML
4 INJECTION INTRAMUSCULAR; INTRAVENOUS EVERY 4 HOURS PRN
Status: CANCELLED | OUTPATIENT
Start: 2025-05-02

## 2025-05-02 RX ORDER — DEXAMETHASONE SODIUM PHOSPHATE 4 MG/ML
4 INJECTION, SOLUTION INTRA-ARTICULAR; INTRALESIONAL; INTRAMUSCULAR; INTRAVENOUS; SOFT TISSUE
Status: CANCELLED | OUTPATIENT
Start: 2025-05-02

## 2025-05-02 RX ORDER — GABAPENTIN 300 MG/1
300 CAPSULE ORAL 3 TIMES DAILY
Status: CANCELLED | OUTPATIENT
Start: 2025-05-02

## 2025-05-02 RX ORDER — POLYETHYLENE GLYCOL 3350 17 G/17G
1 POWDER, FOR SOLUTION ORAL 2 TIMES DAILY PRN
Status: CANCELLED | OUTPATIENT
Start: 2025-05-02

## 2025-05-02 RX ORDER — SODIUM CHLORIDE, SODIUM LACTATE, POTASSIUM CHLORIDE, CALCIUM CHLORIDE 600; 310; 30; 20 MG/100ML; MG/100ML; MG/100ML; MG/100ML
INJECTION, SOLUTION INTRAVENOUS CONTINUOUS
Status: ACTIVE | OUTPATIENT
Start: 2025-05-02 | End: 2025-05-02

## 2025-05-02 RX ORDER — EPINEPHRINE 1 MG/ML(1)
AMPUL (ML) INJECTION
Status: DISCONTINUED | OUTPATIENT
Start: 2025-05-02 | End: 2025-05-02 | Stop reason: HOSPADM

## 2025-05-02 RX ORDER — TRANEXAMIC ACID 100 MG/ML
INJECTION, SOLUTION INTRAVENOUS PRN
Status: DISCONTINUED | OUTPATIENT
Start: 2025-05-02 | End: 2025-05-02 | Stop reason: SURG

## 2025-05-02 RX ORDER — TRAMADOL HYDROCHLORIDE 50 MG/1
50 TABLET ORAL EVERY 4 HOURS PRN
Status: CANCELLED | OUTPATIENT
Start: 2025-05-02

## 2025-05-02 RX ORDER — BISACODYL 10 MG
10 SUPPOSITORY, RECTAL RECTAL
Status: CANCELLED | OUTPATIENT
Start: 2025-05-02

## 2025-05-02 RX ORDER — VANCOMYCIN HYDROCHLORIDE 1 G/20ML
INJECTION, POWDER, LYOPHILIZED, FOR SOLUTION INTRAVENOUS
Status: COMPLETED | OUTPATIENT
Start: 2025-05-02 | End: 2025-05-02

## 2025-05-02 RX ORDER — MIDAZOLAM HYDROCHLORIDE 1 MG/ML
1 INJECTION INTRAMUSCULAR; INTRAVENOUS
Status: DISCONTINUED | OUTPATIENT
Start: 2025-05-02 | End: 2025-05-02 | Stop reason: HOSPADM

## 2025-05-02 RX ORDER — MIDAZOLAM HYDROCHLORIDE 1 MG/ML
INJECTION INTRAMUSCULAR; INTRAVENOUS PRN
Status: DISCONTINUED | OUTPATIENT
Start: 2025-05-02 | End: 2025-05-02 | Stop reason: SURG

## 2025-05-02 RX ORDER — CEFAZOLIN SODIUM 1 G/3ML
INJECTION, POWDER, FOR SOLUTION INTRAMUSCULAR; INTRAVENOUS PRN
Status: DISCONTINUED | OUTPATIENT
Start: 2025-05-02 | End: 2025-05-02 | Stop reason: SURG

## 2025-05-02 RX ORDER — OXYCODONE HCL 5 MG/5 ML
10 SOLUTION, ORAL ORAL
Status: COMPLETED | OUTPATIENT
Start: 2025-05-02 | End: 2025-05-02

## 2025-05-02 RX ORDER — ASPIRIN 81 MG/1
81 TABLET ORAL 2 TIMES DAILY
Status: CANCELLED | OUTPATIENT
Start: 2025-05-02

## 2025-05-02 RX ORDER — DOCUSATE SODIUM 100 MG/1
100 CAPSULE, LIQUID FILLED ORAL 2 TIMES DAILY
Status: CANCELLED | OUTPATIENT
Start: 2025-05-02

## 2025-05-02 RX ORDER — ACETAMINOPHEN 500 MG
1000 TABLET ORAL EVERY 6 HOURS
Status: CANCELLED | OUTPATIENT
Start: 2025-05-02 | End: 2025-05-07

## 2025-05-02 RX ORDER — SODIUM CHLORIDE 9 MG/ML
INJECTION, SOLUTION INTRAMUSCULAR; INTRAVENOUS; SUBCUTANEOUS
Status: DISCONTINUED | OUTPATIENT
Start: 2025-05-02 | End: 2025-05-02 | Stop reason: HOSPADM

## 2025-05-02 RX ORDER — KETOROLAC TROMETHAMINE 15 MG/ML
INJECTION, SOLUTION INTRAMUSCULAR; INTRAVENOUS PRN
Status: DISCONTINUED | OUTPATIENT
Start: 2025-05-02 | End: 2025-05-02 | Stop reason: SURG

## 2025-05-02 RX ORDER — HYDROMORPHONE HYDROCHLORIDE 1 MG/ML
0.25 INJECTION, SOLUTION INTRAMUSCULAR; INTRAVENOUS; SUBCUTANEOUS
Status: CANCELLED | OUTPATIENT
Start: 2025-05-02

## 2025-05-02 RX ORDER — DIPHENHYDRAMINE HYDROCHLORIDE 50 MG/ML
12.5 INJECTION, SOLUTION INTRAMUSCULAR; INTRAVENOUS
Status: DISCONTINUED | OUTPATIENT
Start: 2025-05-02 | End: 2025-05-02 | Stop reason: HOSPADM

## 2025-05-02 RX ORDER — OXYCODONE HYDROCHLORIDE 5 MG/1
5 TABLET ORAL
Refills: 0 | Status: CANCELLED | OUTPATIENT
Start: 2025-05-02

## 2025-05-02 RX ORDER — AMOXICILLIN 250 MG
1 CAPSULE ORAL NIGHTLY
Status: CANCELLED | OUTPATIENT
Start: 2025-05-02

## 2025-05-02 RX ORDER — ACETAMINOPHEN 500 MG
1000 TABLET ORAL EVERY 6 HOURS PRN
Status: CANCELLED | OUTPATIENT
Start: 2025-05-07

## 2025-05-02 RX ORDER — HALOPERIDOL 5 MG/ML
1 INJECTION INTRAMUSCULAR
Status: DISCONTINUED | OUTPATIENT
Start: 2025-05-02 | End: 2025-05-02 | Stop reason: HOSPADM

## 2025-05-02 RX ORDER — DIPHENHYDRAMINE HYDROCHLORIDE 50 MG/ML
25 INJECTION, SOLUTION INTRAMUSCULAR; INTRAVENOUS EVERY 6 HOURS PRN
Status: CANCELLED | OUTPATIENT
Start: 2025-05-02

## 2025-05-02 RX ORDER — HYDROMORPHONE HYDROCHLORIDE 1 MG/ML
0.4 INJECTION, SOLUTION INTRAMUSCULAR; INTRAVENOUS; SUBCUTANEOUS
Status: DISCONTINUED | OUTPATIENT
Start: 2025-05-02 | End: 2025-05-02 | Stop reason: HOSPADM

## 2025-05-02 RX ORDER — LIDOCAINE HYDROCHLORIDE 20 MG/ML
INJECTION, SOLUTION EPIDURAL; INFILTRATION; INTRACAUDAL; PERINEURAL PRN
Status: DISCONTINUED | OUTPATIENT
Start: 2025-05-02 | End: 2025-05-02 | Stop reason: SURG

## 2025-05-02 RX ORDER — HYDROMORPHONE HYDROCHLORIDE 1 MG/ML
1 INJECTION, SOLUTION INTRAMUSCULAR; INTRAVENOUS; SUBCUTANEOUS
Status: DISCONTINUED | OUTPATIENT
Start: 2025-05-02 | End: 2025-05-02 | Stop reason: HOSPADM

## 2025-05-02 RX ORDER — SCOPOLAMINE 1 MG/3D
1 PATCH, EXTENDED RELEASE TRANSDERMAL
Status: CANCELLED | OUTPATIENT
Start: 2025-05-02

## 2025-05-02 RX ORDER — CEFAZOLIN SODIUM 1 G/3ML
2 INJECTION, POWDER, FOR SOLUTION INTRAMUSCULAR; INTRAVENOUS ONCE
Status: DISCONTINUED | OUTPATIENT
Start: 2025-05-02 | End: 2025-05-02 | Stop reason: HOSPADM

## 2025-05-02 RX ORDER — MEPERIDINE HYDROCHLORIDE 25 MG/ML
12.5 INJECTION INTRAMUSCULAR; INTRAVENOUS; SUBCUTANEOUS
Status: DISCONTINUED | OUTPATIENT
Start: 2025-05-02 | End: 2025-05-02 | Stop reason: HOSPADM

## 2025-05-02 RX ADMIN — SODIUM CHLORIDE, POTASSIUM CHLORIDE, SODIUM LACTATE AND CALCIUM CHLORIDE: 600; 310; 30; 20 INJECTION, SOLUTION INTRAVENOUS at 08:19

## 2025-05-02 RX ADMIN — ONDANSETRON 4 MG: 2 INJECTION INTRAMUSCULAR; INTRAVENOUS at 10:22

## 2025-05-02 RX ADMIN — FENTANYL CITRATE 50 MCG: 50 INJECTION, SOLUTION INTRAMUSCULAR; INTRAVENOUS at 09:42

## 2025-05-02 RX ADMIN — PROPOFOL 100 MG: 10 INJECTION, EMULSION INTRAVENOUS at 09:27

## 2025-05-02 RX ADMIN — DEXAMETHASONE SODIUM PHOSPHATE 8 MG: 4 INJECTION INTRA-ARTICULAR; INTRALESIONAL; INTRAMUSCULAR; INTRAVENOUS; SOFT TISSUE at 09:30

## 2025-05-02 RX ADMIN — FENTANYL CITRATE 50 MCG: 50 INJECTION, SOLUTION INTRAMUSCULAR; INTRAVENOUS at 09:23

## 2025-05-02 RX ADMIN — LIDOCAINE HYDROCHLORIDE 40 MG: 20 INJECTION, SOLUTION EPIDURAL; INFILTRATION; INTRACAUDAL; PERINEURAL at 09:27

## 2025-05-02 RX ADMIN — OXYCODONE HYDROCHLORIDE 5 MG: 5 SOLUTION ORAL at 11:14

## 2025-05-02 RX ADMIN — FENTANYL CITRATE 25 MCG: 50 INJECTION, SOLUTION INTRAMUSCULAR; INTRAVENOUS at 11:13

## 2025-05-02 RX ADMIN — SUGAMMADEX 200 MG: 100 INJECTION, SOLUTION INTRAVENOUS at 10:22

## 2025-05-02 RX ADMIN — TRANEXAMIC ACID 1000 MG: 100 INJECTION, SOLUTION INTRAVENOUS at 09:31

## 2025-05-02 RX ADMIN — KETOROLAC TROMETHAMINE 15 MG: 15 INJECTION, SOLUTION INTRAMUSCULAR; INTRAVENOUS at 10:22

## 2025-05-02 RX ADMIN — CEFAZOLIN 2 G: 1 INJECTION, POWDER, FOR SOLUTION INTRAMUSCULAR; INTRAVENOUS at 09:27

## 2025-05-02 RX ADMIN — TRANEXAMIC ACID 1000 MG: 100 INJECTION, SOLUTION INTRAVENOUS at 10:22

## 2025-05-02 RX ADMIN — FENTANYL CITRATE 25 MCG: 50 INJECTION, SOLUTION INTRAMUSCULAR; INTRAVENOUS at 12:23

## 2025-05-02 RX ADMIN — ROCURONIUM BROMIDE 50 MG: 10 INJECTION INTRAVENOUS at 09:27

## 2025-05-02 RX ADMIN — MIDAZOLAM HYDROCHLORIDE 1 MG: 1 INJECTION, SOLUTION INTRAMUSCULAR; INTRAVENOUS at 09:42

## 2025-05-02 RX ADMIN — MIDAZOLAM HYDROCHLORIDE 1 MG: 1 INJECTION, SOLUTION INTRAMUSCULAR; INTRAVENOUS at 09:23

## 2025-05-02 ASSESSMENT — PAIN DESCRIPTION - PAIN TYPE
TYPE: SURGICAL PAIN
TYPE: CHRONIC PAIN;ACUTE PAIN
TYPE: SURGICAL PAIN

## 2025-05-02 ASSESSMENT — FIBROSIS 4 INDEX: FIB4 SCORE: 1.34

## 2025-05-02 ASSESSMENT — GAIT ASSESSMENTS
DISTANCE (FEET): 150
GAIT LEVEL OF ASSIST: SUPERVISED
ASSISTIVE DEVICE: FRONT WHEEL WALKER

## 2025-05-02 ASSESSMENT — COGNITIVE AND FUNCTIONAL STATUS - GENERAL
CLIMB 3 TO 5 STEPS WITH RAILING: A LITTLE
MOVING FROM LYING ON BACK TO SITTING ON SIDE OF FLAT BED: A LITTLE
SUGGESTED CMS G CODE MODIFIER MOBILITY: CJ
WALKING IN HOSPITAL ROOM: A LITTLE
MOVING TO AND FROM BED TO CHAIR: A LITTLE
MOBILITY SCORE: 20

## 2025-05-02 ASSESSMENT — PAIN SCALES - GENERAL: PAIN_LEVEL: 0

## 2025-05-02 NOTE — H&P
Surgery Orthopedic History & Physical Note    Date  5/2/2025    Primary Care Physician  Felix Timmons M.D.    CC  * No Diagnosis Codes entered *    HPI  This is a 79 y.o. female who presented with knee OA; See clinic notes for details.    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    Kmilsdwgi-Kmzrsdh-Mxikgt disease 06/08/2016    Buttock pain 05/24/2019    Cataract 10/2014    Left  and Right Eye IOL    Constipation 11/06/2017    Dyslipidemia 04/12/2016    Essential hypertension 04/12/2016    Glaucoma     High cholesterol     Hypertension     Hypothyroidism     IBD (inflammatory bowel disease)     Impaired fasting blood sugar     Malignant neoplasm of overlapping sites of left breast in female, estrogen receptor positive (HCC)     Osteopenia     Trigger finger, acquired 12/26/2012     ICD-10 transition    Type 2 diabetes mellitus with hyperglycemia, without long-term current use of insulin (HCC) 04/03/2023    pt reports that dr put her on this profolactiley       Past Surgical History:   Procedure Laterality Date    PB MASTECTOMY, PARTIAL Left 04/19/2024    Procedure: LEFT PARTIAL MASTECTOMY;  Surgeon: Meme Altamirano M.D.;  Location: SURGERY SAME DAY [facility];  Service: General    PB MANIPULATN KNEE JT+ANESTHESIA Left 06/06/2022    Procedure: LEFT KNEE MANIPULATION;  Surgeon: Bean Escobar M.D.;  Location: [facility];  Service: Orthopedics    PB TOTAL KNEE ARTHROPLASTY Left 04/04/2022    Procedure: LEFT TOTAL KNEE ARTHROPLASTY;  Surgeon: Bean Escobar M.D.;  Location: [facility];  Service: Orthopedics    PB INCISE FINGER TENDON SHEATH Right 10/26/2021    Procedure: RIGHT THUMB TRIGGER RELEASE;  Surgeon: Brett Maddox M.D.;  Location: [facility];  Service: Orthopedics    KNEE  ARTHROSCOPY Right 06/08/2016    Procedure: KNEE ARTHROSCOPY, MEDIAL MENISECTOMY, DEBRIEDMENT OF LATERAL  FEMORAL CONDYLE;  Surgeon: Real Carias M.D.;  Location: SURGERY [facility];  Service:     LATERAL RELEASE Right 06/08/2016    Procedure: LATERAL RELEASE;  Surgeon: Real Carias M.D.;  Location: [facility];  Service:     TRIGGER FINGER RELEASE Bilateral 04/19/2016    Procedure: TRIGGER FINGER RELEASE-RING;  Surgeon: Brett Maddox M.D.;  Location: SURGERY [facility];  Service:     TRIGGER FINGER RELEASE  12/26/2012    Performed by Brett Maddox M.D. at [facility]    COLONOSCOPY  08/01/2011    CATARACT EXTRACTION WITH IOL Left 03/01/2009    left    TUBAL LIGATION  09/01/1980    APPENDECTOMY      BREAST BIOPSY      multiple breast biopsies    KNEE REPLACEMENT, TOTAL Left     LUMPECTOMY      x 2 or 3 in the 1980's, 2024    TONSILLECTOMY  as child       Current Facility-Administered Medications   Medication Dose Route Frequency Provider Last Rate Last Admin    ceFAZolin (Ancef) injection 2 g  2 g Intravenous Once Osbaldo Chua M.D.        lactated ringers infusion   Intravenous Continuous Osbaldo Chua M.D. 10 mL/hr at 05/02/25 0819 New Bag at 05/02/25 0819       Social History     Socioeconomic History    Marital status:      Spouse name: Not on file    Number of children: Not on file    Years of education: Not on file    Highest education level: Professional school degree (e.g., MD, DDS, DVM, WADE)   Occupational History    Occupation: psych nurse - [place]   Tobacco Use    Smoking status: Never     Passive exposure: Never    Smokeless tobacco: Never   Vaping Use    Vaping status: Never Used   Substance and Sexual Activity    Alcohol use: No     Alcohol/week: 0.0 oz    Drug use: No    Sexual activity: Not Currently     Partners: Male     Comment:     Other Topics Concern    Not on file   Social History Narrative    Works as a psychiatric  nurse.  Lives with her .  No social or domestic concerns.  No hearing aids not using any walking assistance or having any falls.  No major memory concerns.  No recent hospitalizations or surgeries.     Social Drivers of Health     Financial Resource Strain: Low Risk  (4/2/2025)    Overall Financial Resource Strain (CARDIA)     Difficulty of Paying Living Expenses: Not hard at all   Food Insecurity: No Food Insecurity (4/2/2025)    Hunger Vital Sign     Worried About Running Out of Food in the Last Year: Never true     Ran Out of Food in the Last Year: Never true   Transportation Needs: No Transportation Needs (4/2/2025)    PRAPARE - Transportation     Lack of Transportation (Medical): No     Lack of Transportation (Non-Medical): No   Physical Activity: Inactive (4/2/2025)    Exercise Vital Sign     Days of Exercise per Week: 0 days     Minutes of Exercise per Session: 0 min   Stress: No Stress Concern Present (4/2/2025)    [employer] - Occupational Stress Questionnaire     Feeling of Stress : Not at all   Social Connections: Socially Integrated (4/2/2025)    Social Connection and Isolation Panel [NHANES]     Frequency of Communication with Friends and Family: More than three times a week     Frequency of Social Gatherings with Friends and Family: Twice a week     Attends Buddhist Services: More than 4 times per year     Active Member of Clubs or Organizations: Yes     Attends Club or Organization Meetings: More than 4 times per year     Marital Status:    Intimate Partner Violence: Not At Risk (7/30/2024)    Humiliation, Afraid, Rape, and Kick questionnaire     Fear of Current or Ex-Partner: No     Emotionally Abused: No     Physically Abused: No     Sexually Abused: No   Housing Stability: Low Risk  (4/2/2025)    Housing Stability Vital Sign     Unable to Pay for Housing in the Last Year: No     Number of Times Moved in the Last Year: 0     Homeless in the Last Year: No        Family History   Problem Relation Age of Onset    Other Mother         MULTI SYSTEM FAILURE    Stroke Father     Hyperlipidemia Brother     Other Brother         aortic aneurysm repair    Cancer Maternal Aunt         Kidney ca    Kidney cancer Maternal Aunt 70 - 79       Allergies  Sulfa drugs and Anastrozole    Review of Systems  Negative    Physical Exam    Vital Signs  Blood Pressure : 133/67   Temperature: 36.1 °C (96.9 °F)   Pulse: 79   Respiration: 18   Pulse Oximetry: 96 %       Labs:                    Radiology:  No orders to display         Assessment/Plan:  * No Diagnosis Codes entered *  Procedure(s):  RIGHT TOTAL KNEE ARTHROPLASTY

## 2025-05-02 NOTE — ANESTHESIA POSTPROCEDURE EVALUATION
Patient: Kaylee Nazario    Procedure Summary       Date: 05/02/25 Room / Location:  OR 06 / SURGERY [facility]    Anesthesia Start: 0922 Anesthesia Stop: 1043    Procedure: RIGHT TOTAL KNEE ARTHROPLASTY (Right: Knee) Diagnosis: (OSTEOARTHRITIS OF RIGHT KNEE JOINT)    Surgeons: Osbaldo Chua M.D. Responsible Provider: Bairon Alexander III, M.D.    Anesthesia Type: general, peripheral nerve block ASA Status: 2            Final Anesthesia Type: general, peripheral nerve block  Last vitals  BP   Blood Pressure : 112/57    Temp   36.5 °C (97.7 °F)    Pulse   82   Resp   14    SpO2   93 %      Anesthesia Post Evaluation    Patient location during evaluation: PACU  Patient participation: complete - patient participated  Level of consciousness: awake and alert  Pain score: 0    Airway patency: patent  Anesthetic complications: no  Cardiovascular status: hemodynamically stable  Respiratory status: acceptable  Hydration status: euvolemic    PONV: none          No notable events documented.     Nurse Pain Score: 0 (NPRS)

## 2025-05-02 NOTE — OP REPORT
Referring Provider: Self-Referred, Patient      PCP:  Felix Timmons M.D.     Name:Lexis Nazario     MRN: [id number]     DATE OF SURGERY: 05/02/25     SURGEON: Osbaldo Chua MD    PREOPERATIVE DIAGNOSIS:  OSTEOARTHRITIS OF RIGHT KNEE JOINT      POSTOPERATIVE DIAGNOSIS:  OSTEOARTHRITIS OF RIGHT KNEE JOINT     PROCEDURE(s) PERFORMED:  1.  Procedure(s):  Right - RIGHT TOTAL KNEE ARTHROPLASTY - Wound Class: Clean - Incision Closure: Deep and Superficial Layers       Assistants:  Circulator: Ila Franco RHeverNHever; Jackeline Yen R.N.  Limb Hernandez: Jag Munguia  Scrub Person: Leeann Pandey     1st assist:Birdie Hathaway PA-C     Assistants were mandatory to provide adequate exposure for accurate implantation of prosthetic components, while protecting vital neurovascular structures     ANESTHESIOLOGIST:  Anesthesiologist: Bairon Alexander III, M.D.     ANESTHESIA: General     ESTIMATED BLOOD LOSS: 50 cc     TOURNIQUET TIME:    Total Tourniquet Time Documented:  Leg Upper (Right) - 30 minutes  Total: Leg Upper (Right) - 30 minutes       ANTIBIOTICS: 2 gm of Ancef before incision     COMPLICATIONS: * No complications entered in OR log *     IMPLANTS:    Depuy  Size 5 PS attune femur PS  Size 4 attune tibia RP   5x6 mm polyethylene insert  35 mm patella    INDICATION FOR PROCEDURE: Kaylee Nazario is a 79 y.o.? female  who presented for the above procedure. The patient has failed conservative intervention and this has limited the patients ability to perform activities of daily living without restrictions. Risks and benefits of surgery were reviewed which included but were not limited to PE, DVT, infection, hardware failure or loosening, reoperation, the need for transfusion and death. Alternatives to surgery had previously been reviewed with the patient as well.  The patient wished to proceed with the above procedure.     PROCEDURE IN DETAIL: The patient was met in the preoperative holding area.  Informed consent was previously obtained in clinic and we reviewed this again. Appropriate site was marked. The patient was taken to the operating room where the above anesthesia was performed without complication.  Abundant soft roll and a tourniquet were placed high on the thigh of the operative extremity.  The operative lower extremity was then prepped and draped in normal sterile fashion. A formal time-out was performed, identifying correct patient, correct site, and correct procedure.  The patient antibiotics received as above before incision.      At that point, an incision was made down through subcutaneous tissue to fascia. The knee was exposed with a medial parapatellar arthrotomy.   Arthrosis was consistent with our preoperative radiographs.  Appropriate medial soft tissue release was performed for exposure purposes.  I then measured the maximal thickness of the patella.  A guided cut of the patella was performed and I sized the patella.  I then finished the patella. We turned our attention to the knee and freed the anterior medial and lateral meniscus and transected the ACL and PCL. I then drilled the distal femur and placed the intra-medullary guide, set at 5 degrees of valgus and 9 mm. I made my distal femur cut taking care to protect MCL and PCL.  I exposed the tibia.  My Ortholign computer was used to set the tibial cut at 0 degrees varus/valgus and 3 degrees posterior slope. the proximal tibia cut was made. The femur and tibia were sized, and components were selected.  I put my spacer block in at that time in full extension.  Alignment was assessed and felt to be appropriate after appropriate releases were performed.  A spacer block was selected which had about 1-2 mm of opening on the medial and lateral side and appeared to be well balanced with full extension noted.  Laminar spreaders were placed and I placed tension these with the knee placed in around 90 degrees of flexion. I placed my 4-in-1  cutting block with my spacer in place to equalize my flexion and extension gaps.  I assessed my rotation with my AP and epicondylar axis as a double check for appropriate component positioning.  The anterior and posterior condylar followed by my anterior and posterior chamfer cuts were then made.  Posterior osteophytes were then removed.  I then placed my box cut centered on the femur.  My trial femoral component was then placed without complication.  Attention was then turned to the tibia.  I placed my tibial tray in an appropriate amount of external rotation and final preparation of the tibia was made with the central drill and broach.  This was left in place.  I then placed my polyethylene trial without complication.  My patellar trial was placed at that time as well.  With this combination in place, the knee was found to have full extension and full flexion with the drop test.  Stability was excellent and symmetric in flexion and extension.  A lateral release was not necessary to achieve central patellar tracking.  The tourniquet was then elevated.  Trial components were then removed. The bony surfaces were then irrigated with pulsed lavage and suctioned while the cement was mixed and placed in the cement gun.  Periarticular injection was then performed with epinephrine, Marcaine and toradol.  Final components were cemented in place using pressurization techniques and the knee was placed in full extension while the cement cured.  Excess cement was meticulously removed to avoid third body debris.  Tourniquet was deflated and hemostasis was obtained.  Copius irrigation was performed.  Stability was once again assessed without change and the patella was noted to track centrally.  The arthrotomy was closed with #1 Vicryl, subcutaneous tissue closed with 2-0 Vicryl, and I used a 3-0 monofilament for the subcuticular layer for the skin.  A sterile prenio dressing was placed.  All counts were correct at the end of the  case. The patient was awoken from anesthesia and seemed to tolerate procedure well.           DISPOSTION: Stable to the PACU.  The patient will be weight bearing as tolerated.  The patient will have PT/OT while in-house.    DVT prophylaxis will begin POD #1.  Follow-up in my clinic has been set up for a wound check already.

## 2025-05-02 NOTE — ANESTHESIA TIME REPORT
Anesthesia Start and Stop Event Times       Date Time Event    5/2/2025 0911 Ready for Procedure     0922 Anesthesia Start     1043 Anesthesia Stop          Responsible Staff  05/02/25      Name Role Begin End    Bairon Alexander III, M.D. Anesth 0922 1043          Overtime Reason:  no overtime (within assigned shift)    Comments:

## 2025-05-02 NOTE — OR NURSING
Patient allergies and NPO status verified, home medication reconciliation completed and belongings secured. Patient verbalizes understanding of pain scale and established a realistic pain goal. Expected course of stay and plan of care discussed. Surgical site verified, IV access established. VTE per provider preference in place. Medicated per mar. No further needs at this time.

## 2025-05-02 NOTE — ANESTHESIA PROCEDURE NOTES
Airway    Date/Time: 5/2/2025 9:27 AM    Performed by: Bairon Alexander III, M.D.  Authorized by: Bairon Alexander III, M.D.    Location:  OR  Urgency:  Elective  Difficult Airway: No    Indications for Airway Management:  Anesthesia      Spontaneous Ventilation: absent    Sedation Level:  Deep  Preoxygenated: Yes    Final Airway Type:  Supraglottic airway  Final Supraglottic Airway:  Standard LMA    SGA Size:  3  Number of Attempts at Approach:  1

## 2025-05-02 NOTE — THERAPY
Physical Therapy   Initial Evaluation     Patient Name: Kaylee Nazario  Age:  79 y.o., Sex:  female  Medical Record #: [id number]  Today's Date: 5/2/2025          Assessment    Patient is 79 y.o. female with a diagnosis of R TKA. Pt clear on stairs and gait with FWW. Recommend home with home health. PROM 0-95 pain at end range. Pt given HEP with good understanding. VC's to slow pace and keep hands on walker. LK      Plan    Physical Therapy Initial Treatment Plan   Treatment Plan : (P) Self Care / Home Evaluation  Treatment Frequency: (P) Other (See Comments)  Duration: (P) Evaluation only    DC Equipment Recommendations: (P) Front-Wheel Walker, Raised Toilet Seat with Arms  Discharge Recommendations: (P) Recommend home health for continued physical therapy services          05/02/25 1418   Time In/Time Out   Therapy Start Time 1340   Therapy End Time 1418   Total Therapy Time 38   Charge Group   PT Evaluation PT Evaluation Mod   PT Self Care / Home Evaluation (Units) 1   Total Time Spent   PT Evaluation Time Spent (Mins) 23   PT Self Care/Home Evaluation Time Spent (Mins) 15   PT Total Time Spent (Calculated) 38    Services   Is patient using  services for this encounter? No   Initial Contact Note    Initial Contact Note Order Received and Verified, Evaluation Only - Patient Does Not Require Further Acute Physical Therapy at this Time.  However, May Benefit from Post Acute Therapy for Higher Level Functional Deficits.   Vitals   O2 Delivery Device None - Room Air   Pain 0 - 10 Group   Location Knee   Location Orientation Right   Therapist Pain Assessment 5;Nurse Notified;Post Activity Pain Same as Prior to Activity   Prior Living Situation   Prior Services None;Home-Independent   Housing / Facility 1 Story House   Steps Into Home 1   Steps In Home 0   Bathroom Set up   (pole in bathroom)   Equipment Owned Front-Wheel Walker   Lives with - Patient's Self Care Capacity Spouse;Adult  Children  (grand son lives with them)   Comments pt declined elevated toilet seat in past due to it costing $66. son installed a pole close to toilet. Bathroom too small for FWW   Prior Level of Functional Mobility   Bed Mobility Independent   Transfer Status Independent   Ambulation Independent   Ambulation Distance   (limited by distance)   Assistive Devices Used Single Point Cane   Stairs Independent   Comments pt has not done much walking due to pain. limited community distances.   History of Falls   History of Falls No   Cognition    Cognition / Consciousness WDL   Comments receptive to education. appears anxious and moving quickly, not listening when asked to call for assist getting off toilet.   Passive ROM Lower Body   Passive ROM Lower Body WDL   Comments functional L knee 0-95   Active ROM Lower Body    Active ROM Lower Body  X   Comments L knee -2-85   Strength Lower Body   Lower Body Strength  X   Comments R knee pain and weakness at baseline, but functional , L LAQ -3/5   Sensation Lower Body   Lower Extremity Sensation   WDL   Lower Body Muscle Tone   Lower Body Muscle Tone  WDL   Neurological Concerns   Neurological Concerns No   Coordination Lower Body    Coordination Lower Body  WDL   Other Treatments   Other Treatments Provided HEP, sheets given, home safety slow pace, stair training. safety in bathroom all with good understanding but decreased follow through.   Balance Assessment   Sitting Balance (Static) Good   Sitting Balance (Dynamic) Fair +   Standing Balance (Static) Fair +   Standing Balance (Dynamic) Fair   Weight Shift Sitting Good   Weight Shift Standing Fair   Comments with FWW no LOB   Bed Mobility    Supine to Sit   (up in chair)   Sit to Supine   (return to chair)   Scooting Standby Assist   Gait Analysis   Gait Level Of Assist Supervised  (to SBA)   Assistive Device Front Wheel Walker   Distance (Feet) 150   # of Times Distance was Traveled 1   # of Stairs Climbed 2   Level of  Assist with Stairs Standby Assist   Weight Bearing Status as natalia   Vision Deficits Impacting Mobility none   Functional Mobility   Sit to Stand Standby Assist   Bed, Chair, Wheelchair Transfer Standby Assist   Toilet Transfers Standby Assist   6 Clicks Assessment - How much HELP from from another person do you currently need... (If the patient hasn't done an activity recently, how much help from another person do you think he/she would need if he/she tried?)   Turning from your back to your side while in a flat bed without using bedrails? 4   Moving from lying on your back to sitting on the side of a flat bed without using bedrails? 3   Moving to and from a bed to a chair (including a wheelchair)? 3   Standing up from a chair using your arms (e.g., wheelchair, or bedside chair)? 4   Walking in hospital room? 3   Climbing 3-5 steps with a railing? 3   6 clicks Mobility Score 20   Activity Tolerance   Sitting in Chair pre/post   Standing 12   Education Group   Education Provided Role of Physical Therapist;Use of Assistive Device   Role of Physical Therapist Patient Response Patient;Family;Acceptance;Explanation;Verbal Demonstration   Use of Assistive Device Patient Response Patient;Family;Acceptance;Explanation;Verbal Demonstration   Physical Therapy Initial Treatment Plan    Treatment Plan  Self Care / Home Evaluation   Treatment Frequency Other (See Comments)   Duration Evaluation only   Problem List    Problems Decreased Activity Tolerance   Anticipated Discharge Equipment and Recommendations   DC Equipment Recommendations Front-Wheel Walker;Raised Toilet Seat with Arms   Discharge Recommendations Recommend home health for continued physical therapy services   Interdisciplinary Plan of Care Collaboration   IDT Collaboration with  Nursing   Patient Position at End of Therapy Seated;Phone within Reach;Tray Table within Reach;Call Light within Reach   Collaboration Comments re; eval   Session Information   Date /  Session Number  5/2/25 eval only

## 2025-05-02 NOTE — ANESTHESIA PREPROCEDURE EVALUATION
Case: [id number] Date/Time: 05/02/25 0930    Procedure: RIGHT TOTAL KNEE ARTHROPLASTY    Pre-op diagnosis: OSTEOARTHRITIS OF RIGHT KNEE JOINT    Location:  OR 06 / SURGERY [facility]    Surgeons: Osbaldo Chua M.D.            Relevant Problems   CARDIAC   (positive) Primary hypertension      ENDO   (positive) Hypothyroidism   (positive) Type 2 diabetes mellitus with hyperglycemia, without long-term current use of insulin (HCC)   (positive) Type 2 diabetes mellitus without complication, without long-term current use of insulin (HCC)       Physical Exam    Airway   Mallampati: III  TM distance: >3 FB  Neck ROM: limited       Cardiovascular - normal exam  Rhythm: regular  Rate: normal  (-) murmur     Dental - normal exam           Pulmonary - normal exam  Breath sounds clear to auscultation     Abdominal    Neurological - normal exam                   Anesthesia Plan    ASA 2       Plan - general and peripheral nerve block     Peripheral nerve block will be post-op pain control  Airway plan will be LMA          Induction: intravenous    Postoperative Plan: Postoperative administration of opioids is intended.    Pertinent diagnostic labs and testing reviewed    Informed Consent:    Anesthetic plan and risks discussed with patient.    Use of blood products discussed with: patient whom consented to blood products.

## 2025-05-02 NOTE — OR NURSING
1320: Settled in recliner post ambulation from PACU using FWW. Spouse to chairside who has severe hearing loss. Per pt report spouse does not know ASL so unable to use . Using written word to communicate with spouse, including giving him instructions to read while going over w/pt. All questions answered priot to spouse signing.  1333: PT with pt/spouse.   1400: Cleared for d/c by PT who states pt does not need to see OT  1415: D/Landry to care of spouse

## 2025-05-02 NOTE — DISCHARGE INSTRUCTIONS
Resume pre-operative diet upon discharge from the hospital. Depending on how you are feeling and whether you have nausea or not, you may wish to stay with a bland diet for the first few days. However, you can advance your diet as quickly as you feel ready.        Activity: The first week after your knee joint replacement is a time to recover from the surgery. We expect light exercise to keep you active and mobile. Weight-bearing as tolerated.    Assisted Devices: You are being discharged with either a walker or crutches.    Deep Vein Thrombosis (DVT) prevention: You will be sent home with stockings and/or medication.    Dressing and Wound Care: Keep the dressing clean and dry. Leave the dressing in place until follow-up.    Shower/Bathing: OK to shower, keep dressing in place. Pat dressing dry, do not rub the incision.  Swimming pools, hot tubs, or baths are to be avoided until after your follow-up and then only if cleared by your surgeon.      Apply Ice: Apply ice packs to your knee (15 minutes on the knee, 15 minutes off the knee) for the first week, as you feel necessary to help with the pain and swelling.    Swelling/Bruising: Swelling is an expected response to surgery. To reduce swelling, elevate your surgical limb above heart level multiple times per day and apply ice (see Ice instructions). If your swelling becomes excessive, is limiting your ability to move, or becomes worrisome please contact your doctor's office.    Bruising often does not appear until after you arrive home and can be quite dramatic-appearing purple, black, or green. Bruising is typically not concerning and will subside without any treatment.    Follow-Up: Make sure that you have an appointment 7-14 days following surgery. Your procedure/rehabilitation will be discussed and physical therapy may be prescribed at that time.       CALL YOUR DOCTOR IF:     You have fever greater than 101.5 degrees F   You have increased or concerning wound  drainage   You notice excessive bleeding   You notice a great deal of redness around your incision   Your pain can no longer be controlled   Decreased sensation in your toes or   Discoloration of your toes or fingers   Increased wound drainage, persistent wound drainage   Increasing redness around the wound   Increasing or uncontrolled pain,   Your incision begins to open     If you suffer a medical emergency (chest pain, stroke symptoms, etc), you should contact 911 or proceed to your nearest Emergency Room immediately.         ACTIVITY: Rest and take it easy for the first 24 hours.  A responsible adult is recommended to remain with you during that time.  It is normal to feel sleepy.  We encourage you to not do anything that requires balance, judgment or coordination.    MILD FLU-LIKE SYMPTOMS ARE NORMAL. YOU MAY EXPERIENCE GENERALIZED MUSCLE ACHES, THROAT IRRITATION, HEADACHE AND/OR SOME NAUSEA.    FOR 24 HOURS DO NOT:  Drive, operate machinery or run household appliances.  Drink beer or alcoholic beverages.   Make important decisions or sign legal documents.    DIET: To avoid nausea, slowly advance diet as tolerated, avoiding spicy or greasy foods for the first day.  Add more substantial food to your diet according to your physician's instructions.  Babies can be fed formula or breast milk as soon as they are hungry.  INCREASE FLUIDS AND FIBER TO AVOID CONSTIPATION.    FOLLOW-UP APPOINTMENT:  A follow-up appointment should be arranged with your doctor, call to schedule.    You should CALL YOUR PHYSICIAN if you develop:  Fever greater than 101 degrees F.  Pain not relieved by medication, or persistent nausea or vomiting.  Excessive bleeding (blood soaking through dressing) or unexpected drainage from the wound.  Extreme redness or swelling around the incision site, drainage of pus or foul smelling drainage.  Inability to urinate or empty your bladder within 8 hours.  Problems with breathing or chest pain.    You  should call 911 if you develop problems with breathing or chest pain.  If you are unable to contact your doctor or surgical center, you should go to the nearest emergency room or urgent care center.  Physician's telephone #: [phone]    If any questions arise, call your doctor.  If your doctor is not available, please feel free to call the Surgical Center at [phone].     A registered nurse may call you a few days after your surgery to see how you are doing after your procedure.    MEDICATIONS: Resume taking daily medication.  Take prescribed pain medication with food.  If no medication is prescribed, you may take non-aspirin pain medication if needed.  PAIN MEDICATION CAN BE VERY CONSTIPATING.  Take a stool softener or laxative such as senokot, pericolace, or milk of magnesia if needed.    Last pain medication Oxycodone 5mg given at 0953.    If your physician has prescribed pain medication that includes Acetaminophen (Tylenol), do not take additional Acetaminophen (Tylenol) while taking the prescribed medication.

## 2025-05-02 NOTE — OR NURSING
1040: Patient arrived from OR via gurney.  R knee dressing CDI; pedal pulse 2+. Cold pack R knee.   Sedation/Resp Status: Eye opening to verbal.  Respirations spontaneous and non-labored.    HR: 78; VSS on 10L 02 via mask.    1113: Patient is stating pain is 5/10, plan to medicate see MAR.     1145: VIVIAN Vasquez went to update .     1223: Patient states pain is coming back, pain is 6/10.     1315: Stand and march test attempted, patient to stage II. Report given to VIVIAN Mckeon.

## 2025-05-05 ENCOUNTER — HOME HEALTH ADMISSION (OUTPATIENT)
Dept: HOME HEALTH SERVICES | Facility: HOME HEALTHCARE | Age: 80
End: 2025-05-05
Payer: MEDICARE

## 2025-05-06 ENCOUNTER — TELEPHONE (OUTPATIENT)
Dept: HOME HEALTH SERVICES | Facility: HOME HEALTHCARE | Age: 80
End: 2025-05-06
Payer: MEDICARE

## 2025-05-07 ENCOUNTER — TELEPHONE (OUTPATIENT)
Dept: HOME HEALTH SERVICES | Facility: HOME HEALTHCARE | Age: 80
End: 2025-05-07
Payer: MEDICARE

## 2025-05-07 NOTE — TELEPHONE ENCOUNTER
Spoke with patient and she stated can't be seen tomorrow as she has a lunch tomorrow at 11-2pm. Please call before 10am for scheduling Friday or Saturday. Later start of care for 5/8 per request

## 2025-05-08 ENCOUNTER — HOME CARE VISIT (OUTPATIENT)
Dept: HOME HEALTH SERVICES | Facility: HOME HEALTHCARE | Age: 80
End: 2025-05-08
Payer: MEDICARE

## 2025-05-08 ENCOUNTER — DOCUMENTATION (OUTPATIENT)
Dept: MEDICAL GROUP | Facility: PHYSICIAN GROUP | Age: 80
End: 2025-05-08
Payer: MEDICARE

## 2025-05-08 VITALS
HEART RATE: 71 BPM | DIASTOLIC BLOOD PRESSURE: 58 MMHG | SYSTOLIC BLOOD PRESSURE: 102 MMHG | OXYGEN SATURATION: 96 % | RESPIRATION RATE: 16 BRPM | TEMPERATURE: 98.4 F

## 2025-05-08 PROCEDURE — G0151 HHCP-SERV OF PT,EA 15 MIN: HCPCS

## 2025-05-08 PROCEDURE — 665005 NO-PAY RAP - HOME HEALTH

## 2025-05-08 PROCEDURE — 665998 HH PPS REVENUE CREDIT

## 2025-05-08 PROCEDURE — 665001 SOC-HOME HEALTH

## 2025-05-08 PROCEDURE — 665999 HH PPS REVENUE DEBIT

## 2025-05-08 ASSESSMENT — ACTIVITIES OF DAILY LIVING (ADL)
AMBULATION ASSISTANCE ON FLAT SURFACES: 1
AMBULATION ASSISTANCE: 1
OASIS_M1830: 03
PHYSICAL_TRANSFERS_DEVICES: USE OF B UES TO ASSIST
CURRENT_FUNCTION: STAND BY ASSIST
PHYSICAL TRANSFERS ASSESSED: 1
AMBULATION ASSISTANCE: STAND BY ASSIST

## 2025-05-08 ASSESSMENT — ENCOUNTER SYMPTOMS
PAIN SEVERITY GOAL: 3/10
SHORTNESS OF BREATH: 1
PAIN LOCATION - RELIEVING FACTORS: PAIN MEDS, REST, REPOSITIONING
HIGHEST PAIN SEVERITY IN PAST 24 HOURS: 10/10
DYSPNEA ACTIVITY LEVEL: AFTER AMBULATING MORE THAN 20 FT
PAIN LOCATION: RIGHT KNEE
PAIN LOCATION - PAIN DURATION: DAILY
PAIN LOCATION - PAIN QUALITY: SHARP, ACHING
SUBJECTIVE PAIN PROGRESSION: WAXING AND WANING
CHANGE IN APPETITE: UNCHANGED
DEBILITATING PAIN: 1
PAIN LOCATION - EXACERBATING FACTORS: STANDING OR WALKING
APPETITE LEVEL: FAIR
PAIN LOCATION - PAIN SEVERITY: 10/10
LOWEST PAIN SEVERITY IN PAST 24 HOURS: 9/10
PAIN LOCATION - PAIN FREQUENCY: CONSTANT
PAIN: 1

## 2025-05-08 ASSESSMENT — PATIENT HEALTH QUESTIONNAIRE - PHQ9: CLINICAL INTERPRETATION OF PHQ2 SCORE: 0

## 2025-05-08 NOTE — PROGRESS NOTES
"Medication chart review for [facility] services    Received referral from [facility].   Medications reviewed  compared with discharge summary if available.  Discharge summary date, if applicable:   N/a    Current medication list per [facility]     Medication list one, patient is currently taking    Current Outpatient Medications:     doxycycline, 100 mg, Oral, BID    HYDROcodone/acetaminophen, 1 Tablet, Oral, Q4HRS PRN    ASPIRIN 81 PO, 1 Tablet, Oral, BID    Linzess, TAKE 1 CAPSULE BY MOUTH ONCE DAILY AS NEEDED    Vitamin D-3, 1 Tablet, Oral, DAILY    docusate sodium, 100 mg, Oral, BID    levothyroxine, 100 mcg, Oral, AM ES    atorvastatin, 20 mg, Oral, Nightly    aspirin, 81 mg, Oral, BID    metFORMIN ER, 500 mg, Oral, BID    amLODIPine, 10 mg, Oral, DAILY (Patient taking differently: 10 mg, Oral, EVERY MORNING)    furosemide, 20 mg, Oral, DAILY (Patient taking differently: 20 mg, Oral, EVERY MORNING)    lidocaine, 10 mL, Oral, PRN    timolol, 1 Drop, Both Eyes, QAM    latanoprost, 1 Drop, Both Eyes, Nightly    MAGNESIUM PO, Take  by mouth. Indications: supplement      Medication list two, drugs that the patient has been prescribed or recommended to take by their healthcare provider on discharge summary  N/a    Allergies   Allergen Reactions    Sulfa Drugs Anaphylaxis and Shortness of Breath    Anastrozole Unspecified     \"Extreme pain, legs, hips arms felt frozen\".  \"Couldn't move\" Had to get help and call ambulance.  Took 5 days to resolve.       Labs     Lab Results   Component Value Date/Time    SODIUM 145 04/14/2025 10:36 AM    POTASSIUM 4.3 04/14/2025 10:36 AM    CHLORIDE 108 04/14/2025 10:36 AM    CO2 24 04/14/2025 10:36 AM    GLUCOSE 107 (H) 04/14/2025 10:36 AM    BUN 9 04/14/2025 10:36 AM    CREATININE 0.66 04/14/2025 10:36 AM    BUNCREATRAT 10 (L) 02/02/2022 11:30 AM     Lab Results   Component Value Date/Time    ALKPHOSPHAT 115 (H) 04/14/2025 10:36 AM    ASTSGOT 20 04/14/2025 10:36 AM    " ALTSGPT 13 04/14/2025 10:36 AM    TBILIRUBIN 0.4 04/14/2025 10:36 AM    ALBUMIN 4.1 04/14/2025 10:36 AM        Assessment for clinically significant drug interactions, drug omissions/additions, duplicative therapies.            CC   Felix Timmons M.D.  [address]  [facility] [address]  Fax: [phone]    [facility]  Phone [phone] fax [phone]    This note was created using voice recognition software (Dragon). The accuracy of the dictation is limited by the abilities of the software. I have reviewed the note prior to signing, however some errors in grammar and context are still possible. If you have any questions related to this note please do not hesitate to contact our office.       No follow-ups on file.

## 2025-05-08 NOTE — Clinical Note
PRIMARY DIAGNOSIS: Pt is S/P R TKA on 5/2/2025, WBAT R LE, no more than 110 degrees of flexion in first 2 weeks.  PMH includes breast CA, Jqrviqesk-Brkgyle-Ypsxae disease, HTN, HLD, hypothyroidism, glaucoma, DM2, IBD, L TKA 2022, L partial mastectomy 4/2024  SKILLED NEED: health assessments, medication education, education of disease process, gait training, therapeutic exercise, endurance, transfer training and assistive device training.  PT FREQUENCY: 1w1, 2w4  ZIP CODE: [address]  DISCIPLINES ORDERED: PT  INSURANCE: [facility]  CERT PERIOD: 5/8/25 - 7/6/25  SPECIAL NEEDS:

## 2025-05-09 PROCEDURE — 665998 HH PPS REVENUE CREDIT

## 2025-05-09 PROCEDURE — 665999 HH PPS REVENUE DEBIT

## 2025-05-10 PROCEDURE — 665998 HH PPS REVENUE CREDIT

## 2025-05-10 PROCEDURE — 665999 HH PPS REVENUE DEBIT

## 2025-05-11 PROCEDURE — 665998 HH PPS REVENUE CREDIT

## 2025-05-11 PROCEDURE — 665999 HH PPS REVENUE DEBIT

## 2025-05-12 ENCOUNTER — PATIENT OUTREACH (OUTPATIENT)
Dept: HEALTH INFORMATION MANAGEMENT | Facility: OTHER | Age: 80
End: 2025-05-12
Payer: MEDICARE

## 2025-05-12 DIAGNOSIS — I10 PRIMARY HYPERTENSION: ICD-10-CM

## 2025-05-12 DIAGNOSIS — E78.5 DYSLIPIDEMIA: ICD-10-CM

## 2025-05-12 DIAGNOSIS — E11.9 TYPE 2 DIABETES MELLITUS WITHOUT COMPLICATION, WITHOUT LONG-TERM CURRENT USE OF INSULIN (HCC): ICD-10-CM

## 2025-05-12 PROCEDURE — 665998 HH PPS REVENUE CREDIT

## 2025-05-12 PROCEDURE — 665999 HH PPS REVENUE DEBIT

## 2025-05-12 NOTE — PROGRESS NOTES
Kaylee is scheduled to start home health on 5/13. Plan of care deferred to home health  at this time. Encouraged to call with any questions or concerns.

## 2025-05-13 ENCOUNTER — HOME CARE VISIT (OUTPATIENT)
Dept: HOME HEALTH SERVICES | Facility: HOME HEALTHCARE | Age: 80
End: 2025-05-13
Payer: MEDICARE

## 2025-05-13 VITALS
TEMPERATURE: 98.9 F | OXYGEN SATURATION: 95 % | DIASTOLIC BLOOD PRESSURE: 58 MMHG | RESPIRATION RATE: 16 BRPM | HEART RATE: 78 BPM | SYSTOLIC BLOOD PRESSURE: 110 MMHG

## 2025-05-13 PROCEDURE — G0151 HHCP-SERV OF PT,EA 15 MIN: HCPCS

## 2025-05-13 PROCEDURE — 665998 HH PPS REVENUE CREDIT

## 2025-05-13 PROCEDURE — 665999 HH PPS REVENUE DEBIT

## 2025-05-13 ASSESSMENT — ENCOUNTER SYMPTOMS
PAIN: 1
PAIN LOCATION: RIGHT KNEE
PAIN LOCATION - PAIN DURATION: CHRONIC
LOWEST PAIN SEVERITY IN PAST 24 HOURS: 2/10
SUBJECTIVE PAIN PROGRESSION: WAXING AND WANING
PAIN SEVERITY GOAL: 2/10
PAIN LOCATION - PAIN QUALITY: ACHY, SHARP
PAIN LOCATION - RELIEVING FACTORS: REST, POSITIONING, MEDICATION
PAIN LOCATION - PAIN FREQUENCY: CONSTANT
PAIN LOCATION - EXACERBATING FACTORS: ACTIVITY, ROM
PAIN LOCATION - PAIN SEVERITY: 2/10
HIGHEST PAIN SEVERITY IN PAST 24 HOURS: 10/10

## 2025-05-13 ASSESSMENT — ACTIVITIES OF DAILY LIVING (ADL): TRANSPORTATION COMMENTS: REQUIRES ASSIST OF ANOTHER PERSON AND WALKER OUT OF HOME ON UNEVEN SURFACES

## 2025-05-13 NOTE — CASE COMMUNICATION
Noted. Thank you.  ----- Message -----  From: Rossana Yeung, PT  Sent: 5/8/2025   2:57 PM PDT  To: Charlotte Hoover PTA; Aki Jolly R.N.; *      PRIMARY DIAGNOSIS: Pt is S/P R TKA on 5/2/2025, WBAT R LE, no more than 110 degrees of flexion in first 2 weeks.  PMH includes breast CA, Entizzbfy-Xrgekip-Tyomax disease, HTN, HLD, hypothyroidism, glaucoma, DM2, IBD, L TKA 2022, L partial mastectomy 4/2024  SKILLED NEED: health assessments,  medication education, education of disease process, gait training, therapeutic exercise, endurance, transfer training and assistive device training.  PT FREQUENCY: 1w1, 2w4  ZIP CODE: [id number]  DISCIPLINES ORDERED: PT  INSURANCE: [facility]  CERT PERIOD: 5/8/25 - 7/6/25  SPECIAL NEEDS:

## 2025-05-14 PROCEDURE — 665998 HH PPS REVENUE CREDIT

## 2025-05-14 PROCEDURE — 665999 HH PPS REVENUE DEBIT

## 2025-05-15 PROCEDURE — 665999 HH PPS REVENUE DEBIT

## 2025-05-15 PROCEDURE — 665998 HH PPS REVENUE CREDIT

## 2025-05-16 ENCOUNTER — HOME CARE VISIT (OUTPATIENT)
Dept: HOME HEALTH SERVICES | Facility: HOME HEALTHCARE | Age: 80
End: 2025-05-16
Payer: MEDICARE

## 2025-05-16 VITALS
SYSTOLIC BLOOD PRESSURE: 102 MMHG | RESPIRATION RATE: 16 BRPM | HEART RATE: 76 BPM | DIASTOLIC BLOOD PRESSURE: 58 MMHG | TEMPERATURE: 98.9 F | OXYGEN SATURATION: 92 %

## 2025-05-16 PROCEDURE — 665998 HH PPS REVENUE CREDIT

## 2025-05-16 PROCEDURE — G0151 HHCP-SERV OF PT,EA 15 MIN: HCPCS

## 2025-05-16 PROCEDURE — 665999 HH PPS REVENUE DEBIT

## 2025-05-16 ASSESSMENT — ENCOUNTER SYMPTOMS
SUBJECTIVE PAIN PROGRESSION: WAXING AND WANING
PAIN LOCATION - RELIEVING FACTORS: REST, MEDICATION, POSITIONING
PAIN LOCATION - EXACERBATING FACTORS: ACTIVITY, ROM
PAIN: 1
PAIN LOCATION - PAIN FREQUENCY: FREQUENT
PAIN LOCATION - PAIN DURATION: CHRONIC
PAIN LOCATION: RIGHT KNEE
PAIN LOCATION - PAIN QUALITY: ACHY
HIGHEST PAIN SEVERITY IN PAST 24 HOURS: 6/10
PAIN SEVERITY GOAL: 0/10
LOWEST PAIN SEVERITY IN PAST 24 HOURS: 0/10
PAIN LOCATION - PAIN SEVERITY: 4/10

## 2025-05-16 ASSESSMENT — ACTIVITIES OF DAILY LIVING (ADL): TRANSPORTATION COMMENTS: REQUIRES ASSIST OF ANOTHER PERSON AND AD OUT OF HOME ON UNEVEN SURFACES

## 2025-05-17 PROCEDURE — 665999 HH PPS REVENUE DEBIT

## 2025-05-17 PROCEDURE — 665998 HH PPS REVENUE CREDIT

## 2025-05-18 PROCEDURE — 665998 HH PPS REVENUE CREDIT

## 2025-05-18 PROCEDURE — 665999 HH PPS REVENUE DEBIT

## 2025-05-19 PROCEDURE — 665998 HH PPS REVENUE CREDIT

## 2025-05-19 PROCEDURE — 665999 HH PPS REVENUE DEBIT

## 2025-05-20 PROCEDURE — 665999 HH PPS REVENUE DEBIT

## 2025-05-20 PROCEDURE — 665998 HH PPS REVENUE CREDIT

## 2025-05-21 ENCOUNTER — HOME CARE VISIT (OUTPATIENT)
Dept: HOME HEALTH SERVICES | Facility: HOME HEALTHCARE | Age: 80
End: 2025-05-21
Payer: MEDICARE

## 2025-05-21 VITALS
DIASTOLIC BLOOD PRESSURE: 58 MMHG | SYSTOLIC BLOOD PRESSURE: 110 MMHG | HEART RATE: 78 BPM | TEMPERATURE: 97.9 F | RESPIRATION RATE: 16 BRPM | OXYGEN SATURATION: 96 %

## 2025-05-21 PROCEDURE — 665998 HH PPS REVENUE CREDIT

## 2025-05-21 PROCEDURE — G0157 HHC PT ASSISTANT EA 15: HCPCS | Mod: CQ

## 2025-05-21 PROCEDURE — 665999 HH PPS REVENUE DEBIT

## 2025-05-21 ASSESSMENT — ENCOUNTER SYMPTOMS
HIGHEST PAIN SEVERITY IN PAST 24 HOURS: 2/10
PAIN: 1
PAIN LOCATION: R KNEE
PAIN SEVERITY GOAL: 0/10
LOWEST PAIN SEVERITY IN PAST 24 HOURS: 0/10
SUBJECTIVE PAIN PROGRESSION: GRADUALLY IMPROVING

## 2025-05-21 NOTE — CASE COMMUNICATION
Patient was discussed in IDT today due to recent admission. Discussed progress toward goals of care with the treatment team. The treatment team currently involves the following disciplines: PT. Plan is to discharge from home health services when all therapy goals are met.     Thank you,  KEVIN Jolly RN.  
Carried

## 2025-05-22 PROCEDURE — 665998 HH PPS REVENUE CREDIT

## 2025-05-22 PROCEDURE — 665999 HH PPS REVENUE DEBIT

## 2025-05-23 ENCOUNTER — HOME CARE VISIT (OUTPATIENT)
Dept: HOME HEALTH SERVICES | Facility: HOME HEALTHCARE | Age: 80
End: 2025-05-23
Payer: MEDICARE

## 2025-05-23 PROCEDURE — 665999 HH PPS REVENUE DEBIT

## 2025-05-23 PROCEDURE — G0157 HHC PT ASSISTANT EA 15: HCPCS | Mod: CQ

## 2025-05-23 PROCEDURE — 665998 HH PPS REVENUE CREDIT

## 2025-05-24 VITALS
SYSTOLIC BLOOD PRESSURE: 120 MMHG | DIASTOLIC BLOOD PRESSURE: 58 MMHG | HEART RATE: 80 BPM | RESPIRATION RATE: 16 BRPM | OXYGEN SATURATION: 97 % | TEMPERATURE: 98.5 F

## 2025-05-24 PROCEDURE — 665998 HH PPS REVENUE CREDIT

## 2025-05-24 PROCEDURE — 665999 HH PPS REVENUE DEBIT

## 2025-05-24 ASSESSMENT — ENCOUNTER SYMPTOMS
HIGHEST PAIN SEVERITY IN PAST 24 HOURS: 4/10
PAIN LOCATION: R KNEE
PAIN: 1
LOWEST PAIN SEVERITY IN PAST 24 HOURS: 3/10
PAIN SEVERITY GOAL: 0/10
SUBJECTIVE PAIN PROGRESSION: UNCHANGED

## 2025-05-25 PROCEDURE — 665999 HH PPS REVENUE DEBIT

## 2025-05-25 PROCEDURE — 665998 HH PPS REVENUE CREDIT

## 2025-05-26 PROCEDURE — 665998 HH PPS REVENUE CREDIT

## 2025-05-26 PROCEDURE — 665999 HH PPS REVENUE DEBIT

## 2025-05-27 ENCOUNTER — HOME CARE VISIT (OUTPATIENT)
Dept: HOME HEALTH SERVICES | Facility: HOME HEALTHCARE | Age: 80
End: 2025-05-27
Payer: MEDICARE

## 2025-05-27 VITALS
HEART RATE: 83 BPM | TEMPERATURE: 98.6 F | OXYGEN SATURATION: 99 % | DIASTOLIC BLOOD PRESSURE: 60 MMHG | RESPIRATION RATE: 16 BRPM | SYSTOLIC BLOOD PRESSURE: 120 MMHG

## 2025-05-27 PROCEDURE — G0157 HHC PT ASSISTANT EA 15: HCPCS | Mod: CQ

## 2025-05-27 PROCEDURE — 665999 HH PPS REVENUE DEBIT

## 2025-05-27 PROCEDURE — 665998 HH PPS REVENUE CREDIT

## 2025-05-27 ASSESSMENT — ENCOUNTER SYMPTOMS
PAIN: 1
PAIN LOCATION: R KNEE
SUBJECTIVE PAIN PROGRESSION: UNCHANGED
LOWEST PAIN SEVERITY IN PAST 24 HOURS: 4/10
PAIN SEVERITY GOAL: 0/10
HIGHEST PAIN SEVERITY IN PAST 24 HOURS: 8/10

## 2025-05-28 PROCEDURE — 665999 HH PPS REVENUE DEBIT

## 2025-05-28 PROCEDURE — 665998 HH PPS REVENUE CREDIT

## 2025-05-29 ENCOUNTER — HOME CARE VISIT (OUTPATIENT)
Dept: HOME HEALTH SERVICES | Facility: HOME HEALTHCARE | Age: 80
End: 2025-05-29
Payer: MEDICARE

## 2025-05-29 VITALS
SYSTOLIC BLOOD PRESSURE: 108 MMHG | OXYGEN SATURATION: 97 % | DIASTOLIC BLOOD PRESSURE: 60 MMHG | RESPIRATION RATE: 16 BRPM | TEMPERATURE: 98 F | HEART RATE: 69 BPM

## 2025-05-29 PROCEDURE — 665998 HH PPS REVENUE CREDIT

## 2025-05-29 PROCEDURE — 665999 HH PPS REVENUE DEBIT

## 2025-05-29 PROCEDURE — G0157 HHC PT ASSISTANT EA 15: HCPCS | Mod: CQ

## 2025-05-29 ASSESSMENT — ENCOUNTER SYMPTOMS
PAIN: 1
SUBJECTIVE PAIN PROGRESSION: GRADUALLY IMPROVING
HIGHEST PAIN SEVERITY IN PAST 24 HOURS: 4/10
PAIN LOCATION: R KNEE PAIN
LOWEST PAIN SEVERITY IN PAST 24 HOURS: 4/10
PAIN SEVERITY GOAL: 0/10

## 2025-05-30 PROCEDURE — 665999 HH PPS REVENUE DEBIT

## 2025-05-30 PROCEDURE — 665998 HH PPS REVENUE CREDIT

## 2025-06-03 ENCOUNTER — HOME CARE VISIT (OUTPATIENT)
Dept: HOME HEALTH SERVICES | Facility: HOME HEALTHCARE | Age: 80
End: 2025-06-03
Payer: MEDICARE

## 2025-06-03 VITALS
SYSTOLIC BLOOD PRESSURE: 112 MMHG | TEMPERATURE: 100.2 F | OXYGEN SATURATION: 98 % | HEART RATE: 68 BPM | RESPIRATION RATE: 16 BRPM | DIASTOLIC BLOOD PRESSURE: 74 MMHG

## 2025-06-03 PROCEDURE — G0151 HHCP-SERV OF PT,EA 15 MIN: HCPCS

## 2025-06-03 ASSESSMENT — ENCOUNTER SYMPTOMS
LOWEST PAIN SEVERITY IN PAST 24 HOURS: 0/10
PAIN: 1
SUBJECTIVE PAIN PROGRESSION: GRADUALLY IMPROVING
PAIN LOCATION - PAIN SEVERITY: 0/10
HIGHEST PAIN SEVERITY IN PAST 24 HOURS: 6/10
PAIN LOCATION: RIGHT KNEE
PAIN SEVERITY GOAL: 0/10

## 2025-06-03 ASSESSMENT — ACTIVITIES OF DAILY LIVING (ADL)
AMBULATION ASSISTANCE ON FLAT SURFACES: 1
OASIS_M1830: 00
AMBULATION_DISTANCE/DURATION_TOLERATED: 250FT
AMBULATION ASSISTANCE ON UNEVEN SURFACES: 1
HOME_HEALTH_OASIS: 00

## 2025-06-03 ASSESSMENT — PATIENT HEALTH QUESTIONNAIRE - PHQ9: CLINICAL INTERPRETATION OF PHQ2 SCORE: 0

## 2025-06-06 NOTE — CASE COMMUNICATION
Noted. Thank you.  ----- Message -----  From: Denise Schumacher, PT  Sent: 6/3/2025  12:51 PM PDT  To: Aki Jolly R.N.; Lubna Mcguire R.N.; *      PT [facility] DC completed 6/3/25.

## 2025-06-12 ENCOUNTER — PATIENT OUTREACH (OUTPATIENT)
Dept: HEALTH INFORMATION MANAGEMENT | Facility: OTHER | Age: 80
End: 2025-06-12
Payer: MEDICARE

## 2025-06-12 DIAGNOSIS — E11.9 TYPE 2 DIABETES MELLITUS WITHOUT COMPLICATION, WITHOUT LONG-TERM CURRENT USE OF INSULIN (HCC): ICD-10-CM

## 2025-06-12 DIAGNOSIS — E78.5 DYSLIPIDEMIA: ICD-10-CM

## 2025-06-12 DIAGNOSIS — I10 PRIMARY HYPERTENSION: Primary | ICD-10-CM

## 2025-06-17 NOTE — PROGRESS NOTES
Three attempts at monthly outreach follow up. No answer left messages. Will follow up at next monthly outreach.

## 2025-06-19 NOTE — PROGRESS NOTES
Reason for Follow-Up:  Monthly outreach follow up    Current Health Status:  DM2, HTN, dyslipidemia, chronic right knee pain     Medical Updates:    No recent ER visits or hospitalizations    Medication Updates:    No medication changes     Symptoms:    Reports improvement in knee pain since Right total knee replacement     Plan of Care Goals and Progress:    Goal 1. Diabetes Education      Progress:  Last A1c was 6.9, reports adherence to Metformin       Barriers:  Age, progression of disease process, chronic knee pain, knowledge of healthcare, habits     Interventions:  Reviewed Last PCP follow up     Education:  Kaylee reports she eats mainly protein and fruit. She has a salad once or twice a week.     Goal 2. Hypertension education      Progress:  She does not monitor her blood pressure at home. She reports home health monitored her blood pressure regularly, at her last visit it was 117/70s. She reports medication adherence.       Barriers:  Age, progression of disease process, chronic knee pain, knowledge of healthcare, habits     Interventions:  Reviewed Last PCP follow up     Education:  Reviewed medications and diet/nutrition       Patient's Concerns and Feedback (Self Management of Care):   Kaylee returned call for monthly outreach follow-up.  She reports she is recovering well from her recent right knee replacement.  She states home physical therapy went very well and she is now able to drive again.  Encouraged to continue her home exercise program as provided by PT.  Discussed fall prevention strategies.  Reviewed diabetes and hypertension education as above.  Kaylee denied any needs at this time encouraged to call with any questions or concerns    Next Steps:     Follow-Up Plan:  July 2025      Appointments:  8/20 PCP      Contact Information:  Call [phone] with any questions or concerns.

## 2025-06-19 NOTE — CARE PLAN
Problem: Knowledge deficit of self-monitoring blood sugars  Goal: Ensure patient has had proper education on self glucose testing/long term 11/27/24-5/31/25  Outcome: Met  Intervention: Educate on importance of self-monitoring blood glucose  Note: Kaylee does not have orders to monitor blood sugars at home. Currently her diabetes is being monitored by A1c at PCP office.   Intervention: Educate on importance of keeping a home blood glucose log (Provide home blood glucose log if needed)  Intervention: Educate on bringing log to medical provider appointments  Intervention: Encourage discussion with medical provider about referral to diabetes educator  Intervention: Confirm if patient has functional blood glucose monitor and in date testing supplies  Intervention: Patient will verbalize commitment to self-monitoring blood sugar based on medical provider recommendations  Intervention: Patient will bring self-monitoring blood glucose log to medical provider appointments     Problem: Adherence to prescribed medications  Goal: Improve medication adherence/short term 11/27/24-2/28/25  Outcome: Met  Note: Kaylee reports medication adherence   Intervention: Educate patient about the importance of blood pressure medications  Intervention: In conjunction with patient, determine why adherence is a challenge  Intervention: Develop a plan with patient to improve adherence

## 2025-06-27 ENCOUNTER — PATIENT MESSAGE (OUTPATIENT)
Dept: MEDICAL GROUP | Facility: MEDICAL CENTER | Age: 80
End: 2025-06-27
Payer: MEDICARE

## 2025-06-27 DIAGNOSIS — I10 ESSENTIAL HYPERTENSION: ICD-10-CM

## 2025-06-30 DIAGNOSIS — K59.00 CONSTIPATION, UNSPECIFIED CONSTIPATION TYPE: ICD-10-CM

## 2025-06-30 DIAGNOSIS — E78.5 DYSLIPIDEMIA: ICD-10-CM

## 2025-06-30 DIAGNOSIS — E03.9 HYPOTHYROIDISM, UNSPECIFIED TYPE: ICD-10-CM

## 2025-06-30 DIAGNOSIS — E11.9 TYPE 2 DIABETES MELLITUS WITHOUT COMPLICATION, WITHOUT LONG-TERM CURRENT USE OF INSULIN (HCC): ICD-10-CM

## 2025-06-30 DIAGNOSIS — I10 PRIMARY HYPERTENSION: ICD-10-CM

## 2025-06-30 DIAGNOSIS — I10 ESSENTIAL HYPERTENSION: ICD-10-CM

## 2025-06-30 RX ORDER — AMLODIPINE BESYLATE 10 MG/1
10 TABLET ORAL DAILY
Qty: 100 TABLET | Refills: 3 | Status: SHIPPED | OUTPATIENT
Start: 2025-06-30

## 2025-06-30 RX ORDER — METFORMIN HYDROCHLORIDE 500 MG/1
500 TABLET, EXTENDED RELEASE ORAL 2 TIMES DAILY
Qty: 200 TABLET | Refills: 3 | Status: SHIPPED | OUTPATIENT
Start: 2025-06-30

## 2025-06-30 RX ORDER — LINACLOTIDE 145 UG/1
CAPSULE, GELATIN COATED ORAL
Qty: 100 CAPSULE | Refills: 3 | Status: SHIPPED | OUTPATIENT
Start: 2025-06-30

## 2025-06-30 RX ORDER — AMLODIPINE BESYLATE 10 MG/1
10 TABLET ORAL DAILY
Qty: 100 TABLET | Refills: 3 | Status: SHIPPED | OUTPATIENT
Start: 2025-06-30 | End: 2025-06-30 | Stop reason: SDUPTHER

## 2025-06-30 RX ORDER — FUROSEMIDE 20 MG/1
20 TABLET ORAL EVERY MORNING
Qty: 100 TABLET | Refills: 3 | Status: SHIPPED | OUTPATIENT
Start: 2025-06-30

## 2025-06-30 RX ORDER — ATORVASTATIN CALCIUM 20 MG/1
20 TABLET, FILM COATED ORAL NIGHTLY
Qty: 100 TABLET | Refills: 3 | Status: SHIPPED | OUTPATIENT
Start: 2025-06-30

## 2025-06-30 RX ORDER — LEVOTHYROXINE SODIUM 100 UG/1
100 TABLET ORAL
Qty: 100 TABLET | Refills: 3 | Status: SHIPPED | OUTPATIENT
Start: 2025-06-30

## 2025-07-21 ENCOUNTER — TELEPHONE (OUTPATIENT)
Dept: MEDICAL GROUP | Facility: MEDICAL CENTER | Age: 80
End: 2025-07-21
Payer: MEDICARE

## 2025-07-21 NOTE — TELEPHONE ENCOUNTER
Caller Name: Kaylee   Call Back Number:     How would the patient prefer to be contacted with a response: Phone call OK to leave a detailed message    Patient called and lvm a voicemail stating the volteran that was recommended for her to use a few months back isn't helping much for her arthritis. She tried calling a rheumatologist that was recommended in her SCP book and they are booked out until January. She wants to know if there are any other places she can go to have her arthritis looked and get the pain more managable.

## 2025-07-25 ENCOUNTER — PATIENT OUTREACH (OUTPATIENT)
Dept: HEALTH INFORMATION MANAGEMENT | Facility: OTHER | Age: 80
End: 2025-07-25
Payer: MEDICARE

## 2025-07-25 DIAGNOSIS — G89.29 CHRONIC PAIN OF RIGHT KNEE: ICD-10-CM

## 2025-07-25 DIAGNOSIS — E78.5 DYSLIPIDEMIA: ICD-10-CM

## 2025-07-25 DIAGNOSIS — E11.9 TYPE 2 DIABETES MELLITUS WITHOUT COMPLICATION, WITHOUT LONG-TERM CURRENT USE OF INSULIN (HCC): ICD-10-CM

## 2025-07-25 DIAGNOSIS — M25.561 CHRONIC PAIN OF RIGHT KNEE: ICD-10-CM

## 2025-07-25 DIAGNOSIS — I10 PRIMARY HYPERTENSION: Primary | ICD-10-CM

## 2025-07-28 NOTE — PROGRESS NOTES
Reason for Follow-Up:  Monthly outreach follow up    Current Health Status:  DM2, HTN, dyslipidemia, chronic right knee pain     Medical Updates:    No recent ER visits or hospitalizations    Medication Updates:    No medication changes     Symptoms:    Kaylee reports she is doing well.     Plan of Care Goals and Progress:    Goal 1. Diabetes Education      Progress:  Last A1c was 6.9, reports adherence to Metformin       Barriers:  Age, progression of disease process, chronic knee pain, knowledge of healthcare, habits     Interventions:  Reviewed next PCP follow up     Education:  Discussed graduation from [facility] program, Encouraged to call with any questions or concerns.     Goal 2. Hypertension education      Progress:  She does not monitor her blood pressure at home. She reports medication adherence.       Barriers:  Age, progression of disease process, chronic knee pain, knowledge of healthcare, habits     Interventions:  Reviewed next PCP follow up     Education:  Discussed graduation from [facility] program, Encouraged to call with any questions or concerns.       Patient's Concerns and Feedback (Self Management of Care):   Spoke with Kaylee for monthly outreach follow up. She states she is doing well since her total knee replacement. She reports struggling with arthritis like pain in her hand now. Offered to assist in scheduling a PCP follow up appointment. Kaylee declined, she feels her concerns can wait unit her next PCP follow up. Discussed annual care review for [facility]. Kaylee feels she is navigating her healthcare well. She is comfortable using [employer] and communicating with her providers. Her grandson is currently living with her and her . She reports he is very helpful with chores around the house. She feels very supported by family. Reviewed with Kaylee that she can continue to call with any questions or concerns. She and her  can also rejoin the program at any time. She denied any  other needs at this time.     Next Steps:     Follow-Up Plan:  Graduated from [facility] Program, encouraged to call as needed with any questions or concerns.       Appointments:  8/20 PCP, 9/8 Surgical/Oncology      Contact Information:  Call [phone] with any questions or concerns.

## 2025-08-20 ENCOUNTER — OFFICE VISIT (OUTPATIENT)
Dept: MEDICAL GROUP | Facility: MEDICAL CENTER | Age: 80
End: 2025-08-20
Payer: MEDICARE

## 2025-08-20 VITALS
HEART RATE: 71 BPM | BODY MASS INDEX: 24.99 KG/M2 | HEIGHT: 65 IN | DIASTOLIC BLOOD PRESSURE: 60 MMHG | OXYGEN SATURATION: 97 % | RESPIRATION RATE: 14 BRPM | TEMPERATURE: 99.2 F | WEIGHT: 150 LBS | SYSTOLIC BLOOD PRESSURE: 102 MMHG

## 2025-08-20 DIAGNOSIS — I10 PRIMARY HYPERTENSION: Primary | ICD-10-CM

## 2025-08-20 DIAGNOSIS — E11.65 TYPE 2 DIABETES MELLITUS WITH HYPERGLYCEMIA, WITHOUT LONG-TERM CURRENT USE OF INSULIN (HCC): ICD-10-CM

## 2025-08-20 DIAGNOSIS — E03.9 HYPOTHYROIDISM, UNSPECIFIED TYPE: ICD-10-CM

## 2025-08-20 DIAGNOSIS — E78.5 DYSLIPIDEMIA: ICD-10-CM

## 2025-08-20 DIAGNOSIS — M25.50 ARTHRALGIA, UNSPECIFIED JOINT: ICD-10-CM

## 2025-08-20 PROCEDURE — 99214 OFFICE O/P EST MOD 30 MIN: CPT | Performed by: STUDENT IN AN ORGANIZED HEALTH CARE EDUCATION/TRAINING PROGRAM

## 2025-08-20 PROCEDURE — 3078F DIAST BP <80 MM HG: CPT | Performed by: STUDENT IN AN ORGANIZED HEALTH CARE EDUCATION/TRAINING PROGRAM

## 2025-08-20 PROCEDURE — 3074F SYST BP LT 130 MM HG: CPT | Performed by: STUDENT IN AN ORGANIZED HEALTH CARE EDUCATION/TRAINING PROGRAM

## 2025-08-20 PROCEDURE — 1170F FXNL STATUS ASSESSED: CPT | Performed by: STUDENT IN AN ORGANIZED HEALTH CARE EDUCATION/TRAINING PROGRAM

## 2025-08-20 ASSESSMENT — ENCOUNTER SYMPTOMS
FEVER: 0
CHILLS: 0
DIZZINESS: 0
PALPITATIONS: 0
VOMITING: 0
SHORTNESS OF BREATH: 0
HEADACHES: 0
WHEEZING: 0
WEIGHT LOSS: 0
NAUSEA: 0

## 2025-08-20 ASSESSMENT — FIBROSIS 4 INDEX: FIB4 SCORE: 1.34

## (undated) DEVICE — SODIUM CHL. IRRIGATION 0.9% 3000ML (4EA/CA 65CA/PF)

## (undated) DEVICE — CHLORAPREP 26 ML APPLICATOR - ORANGE TINT(25/CA)

## (undated) DEVICE — PIN FIXATION ATTUNE THREAD PACK HEAD HEADLESS

## (undated) DEVICE — BAG SPONGE COUNT 10.25 X 32 - BLUE (250/CA)

## (undated) DEVICE — PACK TOTAL KNEE (1/CA)

## (undated) DEVICE — BINDER BREAST FLORAL LAVENDER XL 40-45"

## (undated) DEVICE — SENSOR OXIMETER ADULT SPO2 RD SET (20EA/BX)

## (undated) DEVICE — DRESSING TEGADERM 8 X 12 - (10/BX 8BX/CA)

## (undated) DEVICE — GLOVE BIOGEL INDICATOR SZ 7SURGICAL PF LTX - (50/BX 4BX/CA)

## (undated) DEVICE — COVER LIGHT HANDLE FLEXIBLE - SOFT (2EA/PK 80PK/CA)

## (undated) DEVICE — TOWEL STOP TIMEOUT SAFETY FLAG (40EA/CA)

## (undated) DEVICE — GLOVE BIOGEL SZ 7 SURGICAL PF LTX - (50PR/BX 4BX/CA)

## (undated) DEVICE — CHLORAPREP 3 ML APPLICATOR - (25/BX 4BX/CS 100/CS)

## (undated) DEVICE — SODIUM CHL IRRIGATION 0.9% 1000ML (12EA/CA)

## (undated) DEVICE — TUBING CLEARLINK DUO-VENT - C-FLO (48EA/CA)

## (undated) DEVICE — CLOSURE SKIN STRIP 1/2 X 4 IN - (STERI STRIP) (50/BX 4BX/CA)

## (undated) DEVICE — CANISTER SUCTION 3000ML MECHANICAL FILTER AUTO SHUTOFF MEDI-VAC NONSTERILE LF DISP  (40EA/CA)

## (undated) DEVICE — LACTATED RINGERS INJ 1000 ML - (14EA/CA 60CA/PF)

## (undated) DEVICE — MASK AIRWAY SIZE 3 UNIQUE SILICON (10/BX)

## (undated) DEVICE — GAUZE FLUFF STERILE 2-PLY 36 X 36 (100EA/CA)

## (undated) DEVICE — SUTURE 2-0 VICRYL PLUS CT-1 - 8 X 18 INCH(12/BX)

## (undated) DEVICE — CLIP MED INTNL HRZN TI ESCP - (25/BX)

## (undated) DEVICE — GLOVE BIOGEL PI INDICATOR SZ 6.5 SURGICAL PF LF - (50/BX 4BX/CA)

## (undated) DEVICE — GOWN WARMING STANDARD FLEX - (30/CA)

## (undated) DEVICE — Device

## (undated) DEVICE — HUMID-VENT HEAT AND MOISTURE EXCHANGE- (50/BX)

## (undated) DEVICE — ELECTRODE DUAL RETURN W/ CORD - (50/PK)

## (undated) DEVICE — SUTURE 3-0 MONOCRYL PLUS PS-1 - 27 INCH (36/BX)

## (undated) DEVICE — MIXER BONE CEMENT REVOLUTION - W/FEMORAL PRESSURIZER (6/CA)

## (undated) DEVICE — WATER IRRIGATION STERILE 1000ML (12EA/CA)

## (undated) DEVICE — SUTURE GENERAL

## (undated) DEVICE — CANISTER SUCTION RIGID RED 1500CC (40EA/CA)

## (undated) DEVICE — SUCTION INSTRUMENT YANKAUER BULBOUS TIP W/O VENT (50EA/CA)

## (undated) DEVICE — PACK BREAST RECONSTRUCTION (2EA/CA)

## (undated) DEVICE — SUTURE 3-0 MONOCRYL PLUS PS-2 - (12/BX)

## (undated) DEVICE — SLEEVE VASO CALF MED - (10PR/CA)

## (undated) DEVICE — CANNULA O2 COMFORT SOFT EAR ADULT 7 FT TUBING (50/CA)

## (undated) DEVICE — NEEDLE LOC 5CM 20GA KOPAN BREAST (10EA/BX)

## (undated) DEVICE — BLADE SAW RECIPROCATING DOUBLE SIDED 70MM X 12.5MM X 0.64MM STERILE (1EA)

## (undated) DEVICE — HANDPIECE 10FT INTPLS SCT PLS IRRIGATION HAND CONTROL SET (6/PK)

## (undated) DEVICE — UNIT NAVIGATION ORTHALIGN PLUS

## (undated) DEVICE — MASK OXYGEN VNYL ADLT MED CONC WITH 7 FOOT TUBING  - (50EA/CA)

## (undated) DEVICE — SET LEADWIRE 5 LEAD BEDSIDE DISPOSABLE ECG (1SET OF 5/EA)

## (undated) DEVICE — BLADE 90X18X1.27MM SAW SAGITTAL

## (undated) DEVICE — TUBE CONNECTING SUCTION - CLEAR PLASTIC STERILE 72 IN (50EA/CA)

## (undated) DEVICE — DRESSING ABDOMINAL PAD STERILE 8 X 10" (360EA/CA)"

## (undated) DEVICE — MARKER CORRECT CLIPS (1EA) - MIN ORDER OF 24 EA MUST BE INCREMENTS OF 24

## (undated) DEVICE — MASK AIRWAY FLEXIBLE SINGLE-USE SIZE 4 ADULTS (10EA/BX)

## (undated) DEVICE — GOWN SURGEONS X-LARGE - DISP. (30/CA)

## (undated) DEVICE — KIT  I.V. START (100EA/CA)

## (undated) DEVICE — GLOVE BIOGEL SZ 6 PF LATEX - (50EA/BX 4BX/CA)

## (undated) DEVICE — CLOSURE PRINEO SKIN - (2EA/BX)

## (undated) DEVICE — SUTURE 4-0 MONOCRYL PLUS PS-2 - 27 INCH (36/BX)

## (undated) DEVICE — SUTURE 2-0 VICRYL PLUS SH - 27 INCH (36/BX)

## (undated) DEVICE — GLOVE BIOGEL PI INDICATOR SZ 7.5 SURGICAL PF LF -(50/BX 4BX/CA)

## (undated) DEVICE — PLUMEPEN ULTRA 3/8 IN X 10 FT HOSE (20EA/CA)

## (undated) DEVICE — TIP INTPLS HFLO ML ORFC BTRY - (12/CS) FOR SURGILAV

## (undated) DEVICE — GLOVE SZ 7 BIOGEL PI MICRO - PF LF (50PR/BX 4BX/CA)